# Patient Record
Sex: FEMALE | Race: BLACK OR AFRICAN AMERICAN | URBAN - METROPOLITAN AREA
[De-identification: names, ages, dates, MRNs, and addresses within clinical notes are randomized per-mention and may not be internally consistent; named-entity substitution may affect disease eponyms.]

---

## 2024-08-13 ENCOUNTER — OFFICE VISIT (OUTPATIENT)
Dept: ORTHOPEDIC SURGERY | Age: 89
End: 2024-08-13

## 2024-08-13 VITALS — HEIGHT: 60 IN

## 2024-08-13 DIAGNOSIS — M54.50 LUMBAR PAIN: Primary | ICD-10-CM

## 2024-08-13 RX ORDER — CARVEDILOL 25 MG/1
25 TABLET ORAL 2 TIMES DAILY WITH MEALS
COMMUNITY

## 2024-08-13 RX ORDER — ASCORBIC ACID, THIAMINE MONONITRATE,RIBOFLAVIN, NIACINAMIDE, PYRIDOXINE HYDROCHLORIDE, FOLIC ACID, CYANOCOBALAMIN, BIOTIN, CALCIUM PANTOTHENATE, 100; 1.5; 1.7; 20; 10; 1; 6000; 150000; 5 MG/1; MG/1; MG/1; MG/1; MG/1; MG/1; UG/1; UG/1; MG/1
1 CAPSULE, LIQUID FILLED ORAL DAILY
COMMUNITY

## 2024-08-13 RX ORDER — ATORVASTATIN CALCIUM 40 MG/1
40 TABLET, FILM COATED ORAL DAILY
COMMUNITY
Start: 2023-03-13

## 2024-08-13 RX ORDER — DICYCLOMINE HYDROCHLORIDE 10 MG/1
20 CAPSULE ORAL 4 TIMES DAILY PRN
COMMUNITY
Start: 2010-02-08

## 2024-08-13 RX ORDER — TRAMADOL HYDROCHLORIDE 50 MG/1
TABLET ORAL
COMMUNITY
Start: 2009-02-20 | End: 2025-04-30

## 2024-08-13 RX ORDER — ASPIRIN 81 MG/1
81 TABLET ORAL DAILY
COMMUNITY

## 2024-08-13 RX ORDER — AZITHROMYCIN 250 MG/1
TABLET, FILM COATED ORAL
COMMUNITY
Start: 2024-07-25

## 2024-08-13 RX ORDER — CIPROFLOXACIN 500 MG/1
TABLET, FILM COATED ORAL
COMMUNITY
Start: 2024-07-19

## 2024-08-13 RX ORDER — CLONIDINE 0.2 MG/24H
PATCH, EXTENDED RELEASE TRANSDERMAL
COMMUNITY
Start: 2021-06-24

## 2024-08-13 NOTE — PROGRESS NOTES
New Patient: LUMBAR SPINE    Referring Provider:  No ref. provider found    CHIEF COMPLAINT:    Chief Complaint   Patient presents with    Back Pain     lumbar       HISTORY OF PRESENT ILLNESS:       Ms. Aimee Taylor  is a pleasant 88 y.o. female, here with her son, who is extremely hard of hearing, here for evaluation regarding her LBP pain. She states her pain began possibly after a few falls over the past 9 months.  Her son states that her last fall was 3 to 4 months ago but no treatment was performed at the time.  She has a history of chronic back pain and has been seeing her primary care physician in [facility] who has had her on chronic pain medication.  Patient is on dialysis for end-stage renal disease and did have a change in mental status due to the Percocet and has recently been changed onto tramadol.  Her son states that tramadol is not helping her pain.  Patient states that her current low back pain is 10/10 with no radiation of pain into her buttocks or legs.  She states that the pain is constant last throughout the day and does interfere with her sleep at night.  She presents in a wheelchair and is a very limited ambulator.  She denies any numbness, tingling, or progressive weakness into either lower extremity.  She denies any new bowel or bladder dysfunction.  She finds that all positions are worse for her back even lying down.   Pain Assessment  Location of Pain: Back  Location Modifiers: Other (Comment)  Severity of Pain: 10  Quality of Pain: Other (Comment)  Duration of Pain: Other (Comment)]  Current/Past Treatment:   Physical Therapy: None  Chiropractic: None  Injection: None  Medications: Tramadol    Past Medical History:   History reviewed. No pertinent past medical history.     Past Surgical History:     History reviewed. No pertinent surgical history.    Current Medications:     Current Outpatient Medications:     atorvastatin (LIPITOR) 40 MG tablet, Take 1 tablet by mouth daily, Disp: ,

## 2025-04-29 ENCOUNTER — APPOINTMENT (OUTPATIENT)
Dept: CT IMAGING | Age: 89
End: 2025-04-29
Payer: COMMERCIAL

## 2025-04-29 ENCOUNTER — HOSPITAL ENCOUNTER (INPATIENT)
Age: 89
LOS: 11 days | Discharge: HOME | End: 2025-05-10
Attending: EMERGENCY MEDICINE | Admitting: STUDENT IN AN ORGANIZED HEALTH CARE EDUCATION/TRAINING PROGRAM
Payer: COMMERCIAL

## 2025-04-29 ENCOUNTER — APPOINTMENT (OUTPATIENT)
Dept: GENERAL RADIOLOGY | Age: 89
End: 2025-04-29
Payer: COMMERCIAL

## 2025-04-29 DIAGNOSIS — Z99.2 ESRD NEEDING DIALYSIS (HCC): Primary | ICD-10-CM

## 2025-04-29 DIAGNOSIS — E87.70 HYPERVOLEMIA, UNSPECIFIED HYPERVOLEMIA TYPE: ICD-10-CM

## 2025-04-29 DIAGNOSIS — R93.89 ABNORMAL CHEST CT: ICD-10-CM

## 2025-04-29 DIAGNOSIS — N18.6 ESRD NEEDING DIALYSIS (HCC): Primary | ICD-10-CM

## 2025-04-29 DIAGNOSIS — S34.109D CLOSED FRACTURE OF LUMBAR VERTEBRA WITH ROUTINE HEALING AND SPINAL CORD INJURY, SUBSEQUENT ENCOUNTER (HCC): ICD-10-CM

## 2025-04-29 DIAGNOSIS — S32.009D CLOSED FRACTURE OF LUMBAR VERTEBRA WITH ROUTINE HEALING AND SPINAL CORD INJURY, SUBSEQUENT ENCOUNTER (HCC): ICD-10-CM

## 2025-04-29 PROBLEM — E87.5 HYPERKALEMIA: Status: ACTIVE | Noted: 2025-04-29

## 2025-04-29 LAB
ALBUMIN SERPL-MCNC: 3.8 G/DL (ref 3.4–5)
ALP SERPL-CCNC: 112 U/L (ref 40–129)
ALT SERPL-CCNC: 24 U/L (ref 10–40)
ANION GAP SERPL CALCULATED.3IONS-SCNC: 13 MMOL/L (ref 3–16)
ANION GAP SERPL CALCULATED.3IONS-SCNC: 14 MMOL/L (ref 3–16)
AST SERPL-CCNC: 37 U/L (ref 15–37)
BACTERIA URNS QL MICRO: ABNORMAL /HPF
BASOPHILS # BLD: 0 K/UL (ref 0–0.2)
BASOPHILS NFR BLD: 0.2 %
BILIRUB DIRECT SERPL-MCNC: <0.1 MG/DL (ref 0–0.3)
BILIRUB INDIRECT SERPL-MCNC: 0.2 MG/DL (ref 0–1)
BILIRUB SERPL-MCNC: 0.3 MG/DL (ref 0–1)
BILIRUB UR QL STRIP.AUTO: NEGATIVE
BUN SERPL-MCNC: 45 MG/DL (ref 7–20)
BUN SERPL-MCNC: 46 MG/DL (ref 7–20)
CALCIUM SERPL-MCNC: 8.9 MG/DL (ref 8.3–10.6)
CALCIUM SERPL-MCNC: 9.1 MG/DL (ref 8.3–10.6)
CHLORIDE SERPL-SCNC: 87 MMOL/L (ref 99–110)
CHLORIDE SERPL-SCNC: 88 MMOL/L (ref 99–110)
CLARITY UR: CLEAR
CO2 SERPL-SCNC: 22 MMOL/L (ref 21–32)
CO2 SERPL-SCNC: 23 MMOL/L (ref 21–32)
COLOR UR: YELLOW
CREAT SERPL-MCNC: 6.6 MG/DL (ref 0.6–1.2)
CREAT SERPL-MCNC: 6.7 MG/DL (ref 0.6–1.2)
DEPRECATED RDW RBC AUTO: 15.1 % (ref 12.4–15.4)
EKG ATRIAL RATE: 63 BPM
EKG DIAGNOSIS: NORMAL
EKG P AXIS: 68 DEGREES
EKG P-R INTERVAL: 146 MS
EKG Q-T INTERVAL: 510 MS
EKG QRS DURATION: 136 MS
EKG QTC CALCULATION (BAZETT): 521 MS
EKG R AXIS: -69 DEGREES
EKG T AXIS: 114 DEGREES
EKG VENTRICULAR RATE: 63 BPM
EOSINOPHIL # BLD: 0.2 K/UL (ref 0–0.6)
EOSINOPHIL NFR BLD: 4.3 %
EPI CELLS #/AREA URNS HPF: ABNORMAL /HPF (ref 0–5)
FLUAV RNA RESP QL NAA+PROBE: NOT DETECTED
FLUBV RNA RESP QL NAA+PROBE: NOT DETECTED
GFR SERPLBLD CREATININE-BSD FMLA CKD-EPI: 6 ML/MIN/{1.73_M2}
GFR SERPLBLD CREATININE-BSD FMLA CKD-EPI: 6 ML/MIN/{1.73_M2}
GLUCOSE SERPL-MCNC: 63 MG/DL (ref 70–99)
GLUCOSE SERPL-MCNC: 72 MG/DL (ref 70–99)
GLUCOSE UR STRIP.AUTO-MCNC: NEGATIVE MG/DL
HCT VFR BLD AUTO: 35.8 % (ref 36–48)
HGB BLD-MCNC: 12.3 G/DL (ref 12–16)
HGB UR QL STRIP.AUTO: ABNORMAL
KETONES UR STRIP.AUTO-MCNC: NEGATIVE MG/DL
LEUKOCYTE ESTERASE UR QL STRIP.AUTO: ABNORMAL
LIPASE SERPL-CCNC: 21 U/L (ref 13–60)
LYMPHOCYTES # BLD: 1 K/UL (ref 1–5.1)
LYMPHOCYTES NFR BLD: 19.9 %
MAGNESIUM SERPL-MCNC: 2.26 MG/DL (ref 1.8–2.4)
MCH RBC QN AUTO: 31.3 PG (ref 26–34)
MCHC RBC AUTO-ENTMCNC: 34.3 G/DL (ref 31–36)
MCV RBC AUTO: 91.1 FL (ref 80–100)
MONOCYTES # BLD: 0.4 K/UL (ref 0–1.3)
MONOCYTES NFR BLD: 7.6 %
NEUTROPHILS # BLD: 3.5 K/UL (ref 1.7–7.7)
NEUTROPHILS NFR BLD: 68 %
NITRITE UR QL STRIP.AUTO: NEGATIVE
NT-PROBNP SERPL-MCNC: ABNORMAL PG/ML (ref 0–449)
PH UR STRIP.AUTO: 7.5 [PH] (ref 5–8)
PLATELET # BLD AUTO: 147 K/UL (ref 135–450)
PMV BLD AUTO: 9.4 FL (ref 5–10.5)
POTASSIUM SERPL-SCNC: 5.4 MMOL/L (ref 3.5–5.1)
POTASSIUM SERPL-SCNC: 5.7 MMOL/L (ref 3.5–5.1)
PROT SERPL-MCNC: 6.7 G/DL (ref 6.4–8.2)
PROT UR STRIP.AUTO-MCNC: 30 MG/DL
RBC # BLD AUTO: 3.93 M/UL (ref 4–5.2)
RBC #/AREA URNS HPF: ABNORMAL /HPF (ref 0–4)
SARS-COV-2 RNA RESP QL NAA+PROBE: NOT DETECTED
SODIUM SERPL-SCNC: 123 MMOL/L (ref 136–145)
SODIUM SERPL-SCNC: 124 MMOL/L (ref 136–145)
SP GR UR STRIP.AUTO: 1.01 (ref 1–1.03)
TROPONIN, HIGH SENSITIVITY: 105 NG/L (ref 0–14)
TROPONIN, HIGH SENSITIVITY: 99 NG/L (ref 0–14)
UA COMPLETE W REFLEX CULTURE PNL UR: ABNORMAL
UA DIPSTICK W REFLEX MICRO PNL UR: YES
URN SPEC COLLECT METH UR: ABNORMAL
UROBILINOGEN UR STRIP-ACNC: 0.2 E.U./DL
WBC # BLD AUTO: 5.1 K/UL (ref 4–11)
WBC #/AREA URNS HPF: ABNORMAL /HPF (ref 0–5)

## 2025-04-29 PROCEDURE — 6360000002 HC RX W HCPCS: Performed by: INTERNAL MEDICINE

## 2025-04-29 PROCEDURE — 81001 URINALYSIS AUTO W/SCOPE: CPT

## 2025-04-29 PROCEDURE — 74174 CTA ABD&PLVS W/CONTRAST: CPT

## 2025-04-29 PROCEDURE — 83690 ASSAY OF LIPASE: CPT

## 2025-04-29 PROCEDURE — 90935 HEMODIALYSIS ONE EVALUATION: CPT

## 2025-04-29 PROCEDURE — 6360000004 HC RX CONTRAST MEDICATION: Performed by: INTERNAL MEDICINE

## 2025-04-29 PROCEDURE — 84484 ASSAY OF TROPONIN QUANT: CPT

## 2025-04-29 PROCEDURE — 80048 BASIC METABOLIC PNL TOTAL CA: CPT

## 2025-04-29 PROCEDURE — 6370000000 HC RX 637 (ALT 250 FOR IP): Performed by: STUDENT IN AN ORGANIZED HEALTH CARE EDUCATION/TRAINING PROGRAM

## 2025-04-29 PROCEDURE — 99285 EMERGENCY DEPT VISIT HI MDM: CPT

## 2025-04-29 PROCEDURE — 80076 HEPATIC FUNCTION PANEL: CPT

## 2025-04-29 PROCEDURE — 85025 COMPLETE CBC W/AUTO DIFF WBC: CPT

## 2025-04-29 PROCEDURE — 36415 COLL VENOUS BLD VENIPUNCTURE: CPT

## 2025-04-29 PROCEDURE — 71046 X-RAY EXAM CHEST 2 VIEWS: CPT

## 2025-04-29 PROCEDURE — 2060000000 HC ICU INTERMEDIATE R&B

## 2025-04-29 PROCEDURE — 6370000000 HC RX 637 (ALT 250 FOR IP): Performed by: INTERNAL MEDICINE

## 2025-04-29 PROCEDURE — 83880 ASSAY OF NATRIURETIC PEPTIDE: CPT

## 2025-04-29 PROCEDURE — 6360000002 HC RX W HCPCS: Performed by: STUDENT IN AN ORGANIZED HEALTH CARE EDUCATION/TRAINING PROGRAM

## 2025-04-29 PROCEDURE — 87636 SARSCOV2 & INF A&B AMP PRB: CPT

## 2025-04-29 PROCEDURE — 83735 ASSAY OF MAGNESIUM: CPT

## 2025-04-29 PROCEDURE — 5A1D70Z PERFORMANCE OF URINARY FILTRATION, INTERMITTENT, LESS THAN 6 HOURS PER DAY: ICD-10-PCS | Performed by: INTERNAL MEDICINE

## 2025-04-29 PROCEDURE — 93005 ELECTROCARDIOGRAM TRACING: CPT | Performed by: INTERNAL MEDICINE

## 2025-04-29 RX ORDER — ONDANSETRON 4 MG/1
4 TABLET, FILM COATED ORAL EVERY 6 HOURS PRN
COMMUNITY

## 2025-04-29 RX ORDER — NIFEDIPINE 60 MG/1
60 TABLET, EXTENDED RELEASE ORAL DAILY
Status: DISCONTINUED | OUTPATIENT
Start: 2025-04-30 | End: 2025-05-08

## 2025-04-29 RX ORDER — ONDANSETRON 2 MG/ML
4 INJECTION INTRAMUSCULAR; INTRAVENOUS EVERY 6 HOURS PRN
Status: DISCONTINUED | OUTPATIENT
Start: 2025-04-29 | End: 2025-05-10 | Stop reason: HOSPADM

## 2025-04-29 RX ORDER — IOPAMIDOL 755 MG/ML
75 INJECTION, SOLUTION INTRAVASCULAR
Status: COMPLETED | OUTPATIENT
Start: 2025-04-29 | End: 2025-04-29

## 2025-04-29 RX ORDER — SODIUM CHLORIDE 0.9 % (FLUSH) 0.9 %
5-40 SYRINGE (ML) INJECTION EVERY 12 HOURS SCHEDULED
Status: DISCONTINUED | OUTPATIENT
Start: 2025-04-29 | End: 2025-05-10 | Stop reason: HOSPADM

## 2025-04-29 RX ORDER — PRIMIDONE 50 MG/1
50 TABLET ORAL DAILY
COMMUNITY

## 2025-04-29 RX ORDER — HYDROCODONE BITARTRATE AND ACETAMINOPHEN 5; 325 MG/1; MG/1
1 TABLET ORAL EVERY 8 HOURS PRN
Status: ON HOLD | COMMUNITY
End: 2025-05-10 | Stop reason: HOSPADM

## 2025-04-29 RX ORDER — CARVEDILOL 25 MG/1
25 TABLET ORAL 2 TIMES DAILY WITH MEALS
Status: DISCONTINUED | OUTPATIENT
Start: 2025-04-29 | End: 2025-05-04

## 2025-04-29 RX ORDER — MORPHINE SULFATE 2 MG/ML
2 INJECTION, SOLUTION INTRAMUSCULAR; INTRAVENOUS
Status: COMPLETED | OUTPATIENT
Start: 2025-04-29 | End: 2025-04-29

## 2025-04-29 RX ORDER — POLYETHYLENE GLYCOL 3350 17 G/17G
17 POWDER, FOR SOLUTION ORAL DAILY PRN
Status: DISCONTINUED | OUTPATIENT
Start: 2025-04-29 | End: 2025-05-10 | Stop reason: HOSPADM

## 2025-04-29 RX ORDER — HEPARIN SODIUM 5000 [USP'U]/ML
5000 INJECTION, SOLUTION INTRAVENOUS; SUBCUTANEOUS EVERY 8 HOURS SCHEDULED
Status: DISCONTINUED | OUTPATIENT
Start: 2025-04-30 | End: 2025-05-10 | Stop reason: HOSPADM

## 2025-04-29 RX ORDER — ACETAMINOPHEN 325 MG/1
650 TABLET ORAL EVERY 6 HOURS PRN
Status: DISCONTINUED | OUTPATIENT
Start: 2025-04-29 | End: 2025-05-10 | Stop reason: HOSPADM

## 2025-04-29 RX ORDER — ONDANSETRON 4 MG/1
4 TABLET, ORALLY DISINTEGRATING ORAL EVERY 8 HOURS PRN
Status: DISCONTINUED | OUTPATIENT
Start: 2025-04-29 | End: 2025-05-10 | Stop reason: HOSPADM

## 2025-04-29 RX ORDER — FENTANYL CITRATE 50 UG/ML
25 INJECTION, SOLUTION INTRAMUSCULAR; INTRAVENOUS ONCE
Refills: 0 | Status: COMPLETED | OUTPATIENT
Start: 2025-04-29 | End: 2025-04-29

## 2025-04-29 RX ORDER — HYDROCODONE BITARTRATE AND ACETAMINOPHEN 5; 325 MG/1; MG/1
1 TABLET ORAL ONCE
Status: COMPLETED | OUTPATIENT
Start: 2025-04-29 | End: 2025-04-29

## 2025-04-29 RX ORDER — SODIUM CHLORIDE 9 MG/ML
INJECTION, SOLUTION INTRAVENOUS PRN
Status: DISCONTINUED | OUTPATIENT
Start: 2025-04-29 | End: 2025-05-10 | Stop reason: HOSPADM

## 2025-04-29 RX ORDER — LABETALOL HYDROCHLORIDE 5 MG/ML
10 INJECTION, SOLUTION INTRAVENOUS EVERY 6 HOURS PRN
Status: DISCONTINUED | OUTPATIENT
Start: 2025-04-29 | End: 2025-05-09

## 2025-04-29 RX ORDER — ATORVASTATIN CALCIUM 40 MG/1
40 TABLET, FILM COATED ORAL NIGHTLY
Status: DISCONTINUED | OUTPATIENT
Start: 2025-04-29 | End: 2025-05-10 | Stop reason: HOSPADM

## 2025-04-29 RX ORDER — PRIMIDONE 50 MG/1
50 TABLET ORAL DAILY
Status: DISCONTINUED | OUTPATIENT
Start: 2025-04-29 | End: 2025-05-10 | Stop reason: HOSPADM

## 2025-04-29 RX ORDER — HYDRALAZINE HYDROCHLORIDE 20 MG/ML
20 INJECTION INTRAMUSCULAR; INTRAVENOUS EVERY 6 HOURS PRN
Status: DISCONTINUED | OUTPATIENT
Start: 2025-04-29 | End: 2025-05-06

## 2025-04-29 RX ORDER — NIFEDIPINE 90 MG/1
90 TABLET, EXTENDED RELEASE ORAL DAILY
Status: ON HOLD | COMMUNITY
End: 2025-05-10 | Stop reason: HOSPADM

## 2025-04-29 RX ORDER — ACETAMINOPHEN 650 MG/1
650 SUPPOSITORY RECTAL EVERY 6 HOURS PRN
Status: DISCONTINUED | OUTPATIENT
Start: 2025-04-29 | End: 2025-05-10 | Stop reason: HOSPADM

## 2025-04-29 RX ORDER — ASPIRIN 81 MG/1
324 TABLET, CHEWABLE ORAL ONCE
Status: COMPLETED | OUTPATIENT
Start: 2025-04-29 | End: 2025-04-29

## 2025-04-29 RX ORDER — HEPARIN SODIUM 1000 [USP'U]/ML
3400 INJECTION, SOLUTION INTRAVENOUS; SUBCUTANEOUS
Status: COMPLETED | OUTPATIENT
Start: 2025-04-29 | End: 2025-05-02

## 2025-04-29 RX ORDER — SODIUM CHLORIDE 0.9 % (FLUSH) 0.9 %
5-40 SYRINGE (ML) INJECTION PRN
Status: DISCONTINUED | OUTPATIENT
Start: 2025-04-29 | End: 2025-05-10 | Stop reason: HOSPADM

## 2025-04-29 RX ADMIN — SODIUM ZIRCONIUM CYCLOSILICATE 10 G: 5 POWDER, FOR SUSPENSION ORAL at 14:26

## 2025-04-29 RX ADMIN — FENTANYL CITRATE 25 MCG: 50 INJECTION INTRAMUSCULAR; INTRAVENOUS at 13:53

## 2025-04-29 RX ADMIN — IOPAMIDOL 75 ML: 755 INJECTION, SOLUTION INTRAVENOUS at 10:51

## 2025-04-29 RX ADMIN — CARVEDILOL 25 MG: 25 TABLET, FILM COATED ORAL at 20:39

## 2025-04-29 RX ADMIN — MORPHINE SULFATE 2 MG: 2 INJECTION, SOLUTION INTRAMUSCULAR; INTRAVENOUS at 21:32

## 2025-04-29 RX ADMIN — ATORVASTATIN CALCIUM 40 MG: 40 TABLET, FILM COATED ORAL at 20:39

## 2025-04-29 RX ADMIN — PRIMIDONE 50 MG: 50 TABLET ORAL at 20:40

## 2025-04-29 RX ADMIN — ASPIRIN 324 MG: 81 TABLET, CHEWABLE ORAL at 12:46

## 2025-04-29 RX ADMIN — LABETALOL HYDROCHLORIDE 10 MG: 5 INJECTION, SOLUTION INTRAVENOUS at 14:25

## 2025-04-29 RX ADMIN — HYDROCODONE BITARTRATE AND ACETAMINOPHEN 1 TABLET: 5; 325 TABLET ORAL at 11:37

## 2025-04-29 ASSESSMENT — PAIN SCALES - GENERAL
PAINLEVEL_OUTOF10: 9
PAINLEVEL_OUTOF10: 0
PAINLEVEL_OUTOF10: 8
PAINLEVEL_OUTOF10: 5
PAINLEVEL_OUTOF10: 8

## 2025-04-29 NOTE — ED NOTES
Patient Name: Aimee Taylor  : 1935 89 y.o.  MRN: [id number]  ED Room #: C30/C30-30     Chief complaint:   Chief Complaint   Patient presents with    Dizziness     Pt arrived from [facility] w/ c/o dizziness, and had refused dialysis the other day. Pt also refused therapy today. [place].     Hospital Problem/Diagnosis:   Hospital Problems           Last Modified POA    * (Principal) Hyperkalemia 2025 Yes         O2 Flow Rate:O2 Device: Nasal cannula O2 Flow Rate (L/min): 3 L/min (if applicable)  Cardiac Rhythm:   (if applicable)  Active LDA's:   Peripheral IV 25 Left;Anterior Forearm (Active)   Site Assessment Clean, dry & intact 25   Line Status Brisk blood return;Flushed;Specimen collected;Normal saline locked 25   Line Care Connections checked and tightened 25   Dressing Status Clean, dry & intact 25            How does patient ambulate? Bed Bound    2. How does patient take pills? Whole with Water    3. Is patient alert? Alert    4. Is patient oriented? To Person, To Place, To Time, To Situation, and Follows Commands patient has moments of confusion.    5.   Patient arrived from:  nursing home  Facility Name: ________[facility]______________________________    6. If patient is disoriented or from a Skill Nursing Facility has family been notified of admission? Yes    7. Patient belongings? Belongings: Clothing    Disposition of belongings? Kept with Patient     8. Any specific patient or family belongings/needs/dynamics?   a.     9. Miscellaneous comments/pending orders?  a.      If there are any additional questions please reach out to the Emergency Department.      Jazz Espinosa RN  25       Jazz Espinosa RN  25

## 2025-04-29 NOTE — ED PROVIDER NOTES
ED Attending Attestation Note     Date of evaluation: 4/29/2025    This patient was seen by the advance practice provider.  I have seen and examined the patient, agree with the workup, evaluation, management and diagnosis. The care plan has been discussed.  I have reviewed the ECG and concur with the MILVIA's interpretation.      Briefly, Aimee Taylor is a 89 y.o. female with a PMH inclusive of ESRD on dialysis who presents for evaluation of upper abdomen and chest pain. Missed dialysis yesterday because she did not want to go. Also did not want to do PT today. Also reports dizziness.     Notable exam findings include left sided abd tenderness, hypertensive, no acute distress    Assessment/ Medical Decision Making:     Will obtain labs, imaging.  Anticipate need for admission given missed dialysis.          Torey Brewer MD  04/29/25 0965

## 2025-04-29 NOTE — ED NOTES
Discussed patients BP with MLP. No additional orders at this time.     Tony Ceja, RN  04/29/25 7811

## 2025-04-29 NOTE — CONSULTS
Nephrology Consult Note  [phone]   [email or web address]        Reason for Consult:  ESKD    HISTORY OF PRESENT ILLNESS:                This is a patient with significant past medical history of ESKD on HD, MWF at [facility], dementia, HTN, CAD/CHF, s/p CABG, chronic back pain, who was recently admitted at [facility]-presented to ED who presented to ED from NH after refusing to go to HD yesterday.  She reports back pain that is chronic.  She is a poor historian and unable to provide history.  She denies any chest pain or SOB.  She does not know her HD unit location.  Labs in ED revealed hyperkalemia with   -Dr. Quiñones from [facility] was consulted-he forwarded consulted to [facility] as patient is followed by us    Past Medical History:        Diagnosis Date    Acute kidney failure     Benign paroxysmal vertigo, bilateral     Chronic gout, unspecified, without tophus (tophi)     Cognitive communication deficit     End stage renal disease (HCC)     Essential (primary) hypertension     Myocardial infarction type 2 (HCC)     Postcholecystectomy syndrome     Sensorineural hearing loss (SNHL), bilateral     Thrombocytopenia        Past Surgical History:    History reviewed. No pertinent surgical history.    Current Medications:    No current facility-administered medications on file prior to encounter.     Current Outpatient Medications on File Prior to Encounter   Medication Sig Dispense Refill    HYDROcodone-acetaminophen (NORCO) 5-325 MG per tablet Take 1 tablet by mouth every 8 hours as needed for Pain. Max Daily Amount: 3 tablets      NIFEdipine (PROCARDIA XL) 60 MG extended release tablet Take 1 tablet by mouth daily      ondansetron (ZOFRAN) 4 MG tablet Take 1 tablet by mouth every 8 hours as needed for Nausea or Vomiting      primidone (MYSOLINE) 50 MG tablet Take 1 tablet by mouth daily      aspirin 81 MG EC tablet Take 1 tablet by mouth daily      atorvastatin (LIPITOR) 40 MG tablet Take 1  a lack of transportation kept you from medical appointments or from doing things needed for daily living?: No   Physical Activity: Unknown (12/13/2019)    Received from [facility]    Exercise Vital Sign     Days of Exercise per Week: Patient declined     Minutes of Exercise per Session: Patient declined   Stress: Unknown (12/13/2019)    Received from [facility]    [place] [employer] - Occupational Stress Questionnaire     Feeling of Stress : Patient declined   Social Connections: Unknown (12/13/2019)    Received from [facility]    Social Connection and Isolation Panel [NHANES]     Frequency of Communication with Friends and Family: Patient declined     Frequency of Social Gatherings with Friends and Family: Patient declined     Attends Nondenominational Services: Patient declined     Active Member of Clubs or Organizations: Patient declined     Attends Club or Organization Meetings: Patient declined     Marital Status: Patient declined   Intimate Partner Violence: Not At Risk (2/6/2024)    Received from [facility]    Interpersonal Safety     Do you feel physically or emotionally unsafe where you currently live?: No     Within the past 12 months, have you been hit, slapped, kicked or otherwise physically hurt by anyone? : No     Within the past 12 months, have you been hit, slapped, kicked or otherwise physically hurt by anyone? : No   Housing Stability: Not At Risk (2/6/2024)    Received from [facility]    Housing/Utilities     Are you worried about losing your housing? : No     Within the past 12 months, have you ever stayed: outside, in a car, in a tent, in an overnight shelter, or temporarily in someone else's home (i.e. couch-surfing)?: No

## 2025-04-29 NOTE — ED NOTES
Dialysis called ready for patient. Unable to continue Tele monitoring while patient is in dialysis, discussed with charge RN and Clinical at this time. Perfectserve sent to Dr Mendez. Per MD swenson to hold tele while in diaylsis. Dialysis RN Christina informed. Requesting transport for patient at this time.      Tony Ceja, RN  04/29/25 4602

## 2025-04-29 NOTE — ED NOTES
Pts son expressed concern for patient to receive MRI for her back Fxs while admitted to hospital today. Discussed this concern with MLP. MLP to bedside to update patients son at this time.      Tony Ceja RN  04/29/25 6011

## 2025-04-29 NOTE — H&P
V2.0  History and Physical      Name:  Aimee Taylor /Age/Sex: 1935  (89 y.o. female)   MRN & CSN:  [id number] & [id number] Encounter Date/Time: 2025 7:49 PM EDT   Location:  [place] [address] PCP: No primary care provider on file.       Hospital Day: 1    Assessment and Plan:     88yo female with history of ESRD on HD, HTN, cad, previous MI, hearing loss, gout, presenting from [facility] facility with chills, back pain, and currently being admditted with electrolyte disturbances and missing HD.      ESRD on HD  -HD as per Nephrology. Anticipate discharge to facility with HD services. Will reach out to case management.     Moderate Hyponatremia - improving  -Continue to monitor. BMP in the am.     Hyperkalemia - resolved  -2/2 esrd    Back pain    Elevated troponin - stable    Normocytic anemia - stable    Disposition:   Current Living situation: AL  Expected Disposition: SNF  Estimated D/C: 1-2 days    Diet Diet NPO   DVT Prophylaxis [] Lovenox, [x]  Heparin, [] SCDs, [] Ambulation,  [] Eliquis, [] Xarelto, [] Coumadin   Code Status Full Code   Surrogate Decision Maker/ POA On file, son.      Personally reviewed Lab Studies and Imaging     Discussed management of the case with case management. I reviewed Nephrology's note.       History from:     patient    History of Present Illness:     88yo female with history of ESRD on HD, HTN, cad, previous MI, hearing loss, gout, presenting from AS facility with chills, back pain, and currently being admditted with electrolyte disturbances and missing HD.       Review of Systems:        Pertinent positives and negatives discussed in HPI     Objective:   No intake or output data in the 24 hours ending 25   Vitals:   Vitals:    25 1400 25 1502 25 1536 25   BP: (!) 180/87 (!) 179/75 (!) 169/82 (!) 164/87   Pulse: 76 61 60 66   Resp: 18 14 16 18   Temp:   97.6 °F (36.4 °C) 97.9 °F (36.6 °C)   TempSrc:    Oral   SpO2: 98% 98%  100%

## 2025-04-29 NOTE — ED NOTES
Pts Oxygen turned off without RN being informed. Pt SPO2 dropped to 84%. O2 resumed @ this time.      Tony Ceja, RN  04/29/25 4578

## 2025-04-29 NOTE — ED NOTES
Per MD ok for patient to eat. Pt provided with lunchbox at this time.      Tony Ceja, RN  04/29/25 7874

## 2025-04-29 NOTE — ED PROVIDER NOTES
THE [facility]  EMERGENCY DEPARTMENT ENCOUNTER          PHYSICIAN ASSISTANT NOTE       Date of evaluation: 4/29/2025    Chief Complaint     Dizziness (Pt arrived from [facility] w/ c/o dizziness, and had refused dialysis the other day. Pt also refused therapy today. [place].)      History of Present Illness     Aimee Taylor is a 89 y.o. female who presents with a PMH of ESRD on HD, heart failure with a last EF of 60 to 65% with severely dilated left atrium., HTN, HLD, DM type II, CAD s/p CABG, lumbar spinal stenosis with radiculopathy s/p L2/L3 lateral interbody fusion, L2 vertebral fracture with 30% height loss who presents to the emergency department with a variety of symptoms.  Per EMS/nursing home, patient refused dialysis yesterday and refused physical therapy today.  Per nursing home, patient was complaining of chest pain however per EMS patient was complaining of upper abdominal pain.  Reported to have some dizziness and high blood pressure.    On history, patient is very hard of hearing and is unable to read lips accurately.  Limiting history.      ASSESSMENT / PLAN  (MEDICAL DECISION MAKING)     INITIAL VITALS: BP: (!) 200/90, Temp: 97.9 °F (36.6 °C), Pulse: 61, Respirations: 16, SpO2: 94 %    Aimee Taylor is a 89 y.o. female who presents with a PMH of ESRD on HD, heart failure with a last EF of 60 to 65% with severely dilated left atrium., HTN, HLD, DM type II, CAD s/p CABG, lumbar spinal stenosis with radiculopathy s/p L2/L3 lateral interbody fusion, L2 vertebral fracture with 30% height loss who presents to the emergency department with a variety of symptoms.  Per EMS/nursing home, patient refused dialysis yesterday and refused physical therapy today.  Per nursing home, patient was complaining of chest pain however per EMS patient was complaining of upper abdominal pain.  Reported to have some dizziness and high blood pressure.    On history, patient is very hard of hearing and is

## 2025-04-30 LAB
ANION GAP SERPL CALCULATED.3IONS-SCNC: 13 MMOL/L (ref 3–16)
BASOPHILS # BLD: 0 K/UL (ref 0–0.2)
BASOPHILS NFR BLD: 0.2 %
BUN SERPL-MCNC: 21 MG/DL (ref 7–20)
CALCIUM SERPL-MCNC: 8.6 MG/DL (ref 8.3–10.6)
CHLORIDE SERPL-SCNC: 91 MMOL/L (ref 99–110)
CO2 SERPL-SCNC: 25 MMOL/L (ref 21–32)
CREAT SERPL-MCNC: 4.1 MG/DL (ref 0.6–1.2)
DEPRECATED RDW RBC AUTO: 14.8 % (ref 12.4–15.4)
EOSINOPHIL # BLD: 0.2 K/UL (ref 0–0.6)
EOSINOPHIL NFR BLD: 3.7 %
GFR SERPLBLD CREATININE-BSD FMLA CKD-EPI: 10 ML/MIN/{1.73_M2}
GLUCOSE BLD-MCNC: 110 MG/DL (ref 70–99)
GLUCOSE SERPL-MCNC: 64 MG/DL (ref 70–99)
HCT VFR BLD AUTO: 34.6 % (ref 36–48)
HGB BLD-MCNC: 11.7 G/DL (ref 12–16)
LYMPHOCYTES # BLD: 1.1 K/UL (ref 1–5.1)
LYMPHOCYTES NFR BLD: 23.1 %
MCH RBC QN AUTO: 30.8 PG (ref 26–34)
MCHC RBC AUTO-ENTMCNC: 33.8 G/DL (ref 31–36)
MCV RBC AUTO: 91.1 FL (ref 80–100)
MONOCYTES # BLD: 0.5 K/UL (ref 0–1.3)
MONOCYTES NFR BLD: 10.6 %
NEUTROPHILS # BLD: 2.9 K/UL (ref 1.7–7.7)
NEUTROPHILS NFR BLD: 62.4 %
PERFORMED ON: ABNORMAL
PHOSPHATE SERPL-MCNC: 3.7 MG/DL (ref 2.5–4.9)
PLATELET # BLD AUTO: 119 K/UL (ref 135–450)
PMV BLD AUTO: 9.8 FL (ref 5–10.5)
POTASSIUM SERPL-SCNC: 4 MMOL/L (ref 3.5–5.1)
RBC # BLD AUTO: 3.79 M/UL (ref 4–5.2)
SODIUM SERPL-SCNC: 129 MMOL/L (ref 136–145)
WBC # BLD AUTO: 4.6 K/UL (ref 4–11)

## 2025-04-30 PROCEDURE — 85025 COMPLETE CBC W/AUTO DIFF WBC: CPT

## 2025-04-30 PROCEDURE — 6360000002 HC RX W HCPCS: Performed by: NURSE PRACTITIONER

## 2025-04-30 PROCEDURE — 36415 COLL VENOUS BLD VENIPUNCTURE: CPT

## 2025-04-30 PROCEDURE — 6360000002 HC RX W HCPCS: Performed by: STUDENT IN AN ORGANIZED HEALTH CARE EDUCATION/TRAINING PROGRAM

## 2025-04-30 PROCEDURE — 2060000000 HC ICU INTERMEDIATE R&B

## 2025-04-30 PROCEDURE — 51798 US URINE CAPACITY MEASURE: CPT

## 2025-04-30 PROCEDURE — 6370000000 HC RX 637 (ALT 250 FOR IP): Performed by: STUDENT IN AN ORGANIZED HEALTH CARE EDUCATION/TRAINING PROGRAM

## 2025-04-30 PROCEDURE — 80048 BASIC METABOLIC PNL TOTAL CA: CPT

## 2025-04-30 PROCEDURE — 2580000003 HC RX 258: Performed by: INTERNAL MEDICINE

## 2025-04-30 PROCEDURE — 84100 ASSAY OF PHOSPHORUS: CPT

## 2025-04-30 PROCEDURE — 2500000003 HC RX 250 WO HCPCS: Performed by: STUDENT IN AN ORGANIZED HEALTH CARE EDUCATION/TRAINING PROGRAM

## 2025-04-30 RX ORDER — HYDROMORPHONE HYDROCHLORIDE 1 MG/ML
0.25 INJECTION, SOLUTION INTRAMUSCULAR; INTRAVENOUS; SUBCUTANEOUS
Status: COMPLETED | OUTPATIENT
Start: 2025-04-30 | End: 2025-04-30

## 2025-04-30 RX ORDER — DEXTROSE MONOHYDRATE 100 MG/ML
INJECTION, SOLUTION INTRAVENOUS CONTINUOUS PRN
Status: DISCONTINUED | OUTPATIENT
Start: 2025-04-30 | End: 2025-05-10 | Stop reason: HOSPADM

## 2025-04-30 RX ORDER — GLUCAGON 1 MG/ML
1 KIT INJECTION PRN
Status: DISCONTINUED | OUTPATIENT
Start: 2025-04-30 | End: 2025-05-10 | Stop reason: HOSPADM

## 2025-04-30 RX ORDER — OXYCODONE HYDROCHLORIDE 5 MG/1
5 TABLET ORAL EVERY 6 HOURS PRN
Refills: 0 | Status: DISCONTINUED | OUTPATIENT
Start: 2025-04-30 | End: 2025-05-06

## 2025-04-30 RX ADMIN — CARVEDILOL 25 MG: 25 TABLET, FILM COATED ORAL at 08:38

## 2025-04-30 RX ADMIN — ATORVASTATIN CALCIUM 40 MG: 40 TABLET, FILM COATED ORAL at 20:33

## 2025-04-30 RX ADMIN — HEPARIN SODIUM 5000 UNITS: 5000 INJECTION INTRAVENOUS; SUBCUTANEOUS at 22:00

## 2025-04-30 RX ADMIN — OXYCODONE 5 MG: 5 TABLET ORAL at 12:42

## 2025-04-30 RX ADMIN — CARVEDILOL 25 MG: 25 TABLET, FILM COATED ORAL at 15:32

## 2025-04-30 RX ADMIN — SODIUM CHLORIDE, PRESERVATIVE FREE 10 ML: 5 INJECTION INTRAVENOUS at 09:06

## 2025-04-30 RX ADMIN — HEPARIN SODIUM 5000 UNITS: 5000 INJECTION INTRAVENOUS; SUBCUTANEOUS at 05:34

## 2025-04-30 RX ADMIN — SODIUM CHLORIDE, PRESERVATIVE FREE 10 ML: 5 INJECTION INTRAVENOUS at 20:33

## 2025-04-30 RX ADMIN — NIFEDIPINE 60 MG: 60 TABLET, EXTENDED RELEASE ORAL at 08:38

## 2025-04-30 RX ADMIN — PRIMIDONE 50 MG: 50 TABLET ORAL at 17:15

## 2025-04-30 RX ADMIN — DEXTROSE 125 ML: 10 SOLUTION INTRAVENOUS at 07:27

## 2025-04-30 RX ADMIN — HEPARIN SODIUM 5000 UNITS: 5000 INJECTION INTRAVENOUS; SUBCUTANEOUS at 15:27

## 2025-04-30 RX ADMIN — HYDROMORPHONE HYDROCHLORIDE 0.25 MG: 1 INJECTION, SOLUTION INTRAMUSCULAR; INTRAVENOUS; SUBCUTANEOUS at 04:21

## 2025-04-30 ASSESSMENT — PAIN DESCRIPTION - ORIENTATION
ORIENTATION: MID
ORIENTATION: MID

## 2025-04-30 ASSESSMENT — PAIN DESCRIPTION - FREQUENCY
FREQUENCY: CONTINUOUS
FREQUENCY: CONTINUOUS

## 2025-04-30 ASSESSMENT — PAIN DESCRIPTION - PAIN TYPE
TYPE: CHRONIC PAIN
TYPE: CHRONIC PAIN

## 2025-04-30 ASSESSMENT — PAIN DESCRIPTION - DESCRIPTORS
DESCRIPTORS: DISCOMFORT;ACHING
DESCRIPTORS: ACHING

## 2025-04-30 ASSESSMENT — PAIN SCALES - GENERAL
PAINLEVEL_OUTOF10: 0
PAINLEVEL_OUTOF10: 10
PAINLEVEL_OUTOF10: 6
PAINLEVEL_OUTOF10: 0
PAINLEVEL_OUTOF10: 5

## 2025-04-30 ASSESSMENT — PAIN DESCRIPTION - ONSET
ONSET: ON-GOING
ONSET: ON-GOING

## 2025-04-30 ASSESSMENT — PAIN - FUNCTIONAL ASSESSMENT
PAIN_FUNCTIONAL_ASSESSMENT: ACTIVITIES ARE NOT PREVENTED
PAIN_FUNCTIONAL_ASSESSMENT: ACTIVITIES ARE NOT PREVENTED

## 2025-04-30 ASSESSMENT — PAIN DESCRIPTION - LOCATION
LOCATION: BACK
LOCATION: BACK

## 2025-04-30 NOTE — CARE COORDINATION
Case Management Assessment  Initial Evaluation    Date/Time of Evaluation: 4/30/2025 9:54 AM  Assessment Completed by: Juliana Pearson    If patient is discharged prior to next notation, then this note serves as note for discharge by case management.    Patient Name: Aimee Taylor                   YOB: 1935  Diagnosis: Hyperkalemia [E87.5]  ESRD needing dialysis (HCC) [N18.6, Z99.2]  Hypervolemia, unspecified hypervolemia type [E87.70]                   Date / Time: 4/29/2025  9:06 AM    Patient Admission Status: Inpatient   Readmission Risk (Low < 19, Mod (19-27), High > 27): Readmission Risk Score: 15.4    Current PCP: No primary care provider on file.  PCP verified by CM?      Chart Reviewed: Yes      History Provided by:    Patient Orientation:      Patient Cognition:      Hospitalization in the last 30 days (Readmission):  No    If yes, Readmission Assessment in CM Navigator will be completed.    Advance Directives:      Code Status: Full Code   Patient's Primary Decision Maker is:        Discharge Planning:    Patient lives with:   Type of Home:    Primary Care Giver:    Patient Support Systems include:     Current Financial resources:    Current community resources:    Current services prior to admission:              Current DME:              Type of Home Care services:       ADLS  Prior functional level:    Current functional level:      PT AM-PAC:   /24  OT AM-PAC:   /24    Family can provide assistance at DC:    Would you like Case Management to discuss the discharge plan with any other family members/significant others, and if so, who?    Plans to Return to Present Housing:    Other Identified Issues/Barriers to RETURNING to current housing: none  Potential Assistance needed at discharge:              Potential DME:    Patient expects to discharge to:    Plan for transportation at discharge:      Financial    Payor: [facility] / Plan: [facility] DUAL / Product Type: *No

## 2025-04-30 NOTE — CONSULTS
[facility]  Palliative Medicine Consultation Note      Date Of Admission:4/29/2025  Date of consult: 04/30/25  Seen by PC in the past:  No    Recommendations:        Went to see pt at the bedside, she was sleeping upon arrival and extremely [place]. Unable to participate in a goals of care discussion.    Called pt's daughter Cici who is listed as pt's primary contact at her facility. No answer, left VM with callback number.     Called Cici again, no answer, left VM with callback number.     Called pt's son Ronny, no answer, left VM with callback number.     Called pt's son Barrett, no answer, left VM with callback number.     1. Goals of Care/Advanced Care planning/Code status: Full code per documentation from pt's facility. Pt's children appear to be her NOK. No advance directives on file. I am not able to reach them at this time.   2. Pain: pt did not state, does not appear uncomfortable.  3. SOB: pt resting comfortably on 2LNC  4. Refusing dialysis: pt reportedly refused dialysis at her facility yesterday for unknown reasons. She got dialysis last night. Unable to discuss with her today. I cannot see that pt has missed dialysis previously. She had several recent visits to [facility] but I am unable to access these records.   5. Disposition: Pt from [facility], likely to return when medically ready for discharge    Reason for Consult:         [x]  Goals of Care  []  Code Status Discussion/Advanced Care Planning   []  Psychosocial/Family Support  []  Symptom Management  []  Other (Specify)    Requesting Physician: Dr. Christopher Mendez    CHIEF COMPLAINT:  refusal of dialysis    History Obtained From:  patient, electronic medical record    History of Present Illness:         Aimee Taylor is a 89 y.o. female with PMH of  ESRD on HD, heart failure with a last EF of 60 to 65% with severely dilated left atrium., dementia, HTN, HLD, DM type II, CAD s/p CABG, lumbar spinal stenosis with  4.6   HGB 12.3 11.7*   HCT 35.8* 34.6*    119*       LFT's:   Recent Labs     04/29/25  0941   AST 37   ALT 24   BILITOT 0.3   ALKPHOS 112     Troponin: No results for input(s): \"TROPONINI\" in the last 72 hours.  BNP: No results for input(s): \"BNP\" in the last 72 hours.  ABGs: No results for input(s): \"PHART\", \"[id number]\", \"PO2ART\" in the last 72 hours.  INR: No results for input(s): \"INR\" in the last 72 hours.    U/A:  Recent Labs     04/29/25  1123   COLORU Yellow   PHUR 7.5   WBCUA 3-5   RBCUA 0-2   BACTERIA 1+*   CLARITYU Clear   LEUKOCYTESUR SMALL*   UROBILINOGEN 0.2   BILIRUBINUR Negative   BLOODU TRACE-INTACT*   GLUCOSEU Negative       CTA CHEST ABDOMEN PELVIS W CONTRAST   Final Result   1.  No evidence of acute aortic injury. No dissection, penetrating ulcer or   aneurysm.   2.  Relative hypoenhancement of a 2.5 cm subendocardial region in the left   lateral ventricular wall which could be acute or chronic infarct or   hypoperfusion.   3.  Small bilateral pleural effusions with adjacent subsegmental atelectasis.   4.  No definite acute findings in the chest abdomen or pelvis.      Electronically signed by Mic Rico      XR CHEST (2 VW)   Final Result   1.  Bilateral patchy groundglass and consolidative opacities may be focal   pneumonia versus pulmonary edema. Small right pleural effusion.      Electronically signed by Mic Rico            Conclusion/Time spent:         Recommendations see above    Time spent with patient and/or family: 15  Time reviewing records: 10 min   Time communicating with staff: 5 min     A total of 30 minutes spent with the patient and family on unit greater than 50% in counseling regarding palliative care and in goals of care for the patient.    Thank you to Dr. Christopher Mendez for this consultation. We will continue to follow Ms. Taylor's care as needed.      Margaret Collado [facility]  Inpatient Palliative Care  [phone]

## 2025-04-30 NOTE — PROGRESS NOTES
Pt admitted to 4451 from ED and placed on telemetry.     4 Eyes Skin Assessment     NAME:  Aimee Taylor  YOB: 1935  MEDICAL RECORD NUMBER:  [id number]    The patient is being assessed for  Admission    I agree that at least one RN has performed a thorough Head to Toe Skin Assessment on the patient. ALL assessment sites listed below have been assessed.      Areas assessed by both nurses:    Head, Face, Ears, Shoulders, Back, Chest, Arms, Elbows, Hands, Sacrum. Buttock, Coccyx, Ischium, Legs. Feet and Heels, and Under Medical Devices       Pink/red moist on coccyx area, no open area. Both breast moist, and  small bruise on left leg  Does the Patient have a Wound? No noted wound(s)       Jarek Prevention initiated by RN: No  Wound Care Orders initiated by RN: No    Pressure Injury (Stage 3,4, Unstageable, DTI, NWPT, and Complex wounds) if present, place Wound referral order by RN under : No    New Ostomies, if present place, Ostomy referral order under : No     Nurse 1 eSignature: Electronically signed by Anyi Jeronimo RN on 4/30/25 at 6:22 AM EDT    **SHARE this note so that the co-signing nurse can place an eSignature**    Nurse 2 eSignature: Electronically signed by Lucinda Malin RN on 4/30/25 at 5:58 AM EDT

## 2025-04-30 NOTE — PROGRESS NOTES
Treatment time: 3.5 hrs    Net UF: 3500 ml    Pre weight: 83 kg  Post weight: 79.5 kg  EDW: TBD    Access used: Rt CW TDC  Access function: Well tolerated, 350 BFR    Medications or blood products given: none    Regular outpatient schedule: TTS    Summary of response to treatment: Pt tolerated well. Pt remained stable throughout entire treatment and upon exiting the hemodialysis suite. Report given to Jazz Espinosa RN

## 2025-04-30 NOTE — PROGRESS NOTES
V2.0    [facility] Progress Note      Name:  Aimee Taylor /Age/Sex: 1935  (89 y.o. female)   MRN & CSN:  [id number] & [id number] Encounter Date/Time: 2025 6:39 PM EDT   Location:  [address] [place] [address] PCP: No primary care provider on file.     Attending:Enoch Mason*       Hospital Day: 2    Assessment and Recommendations     88yo female with history of ESRD on HD, HTN, cad, previous MI, hearing loss, gout, presenting from AS facility with chills, back pain, and currently being admditted with electrolyte disturbances and missing HD.       ESRD on HD  -HD as per Nephrology. Anticipate discharge to facility with HD services. Follow up with case management.     Moderate Hyponatremia - Continues to improve.   -Continue to monitor. BMP in the am.      Hyperkalemia - resolved  -2/2 esrd     Back pain     Elevated troponin - stable     Normocytic anemia - stable      Diet ADULT DIET; Regular; 4 carb choices (60 gm/meal); Low Sodium (2 gm); 1800 ml   DVT Prophylaxis [] Lovenox, [x]  Heparin, [] SCDs, [] Ambulation,  [] Eliquis, [] Xarelto  [] Coumadin   Code Status Full Code   Disposition From: AL  Expected Disposition: AL vs SNF  Estimated Date of Discharge: 1 DAY  Patient requires continued admission due to ESRD   Surrogate Decision Maker/ POA  On file, son     Personally reviewed Lab Studies and Imaging     Discussed management of the case with Nephrology. I reviewed palliative care's note.         Subjective:     Patient seen and evaluated at bedside. Complaining of back pain.       Review of Systems:      Pertinent positives and negatives discussed in HPI    Objective:     Intake/Output Summary (Last 24 hours) at 2025 1839  Last data filed at 2025 1823  Gross per 24 hour   Intake 400 ml   Output 50 ml   Net 350 ml      Vitals:   Vitals:    25 1312 25 1526 25 1532 25 1717   BP:  (!) 166/77  (!) 166/72   Pulse:  60 61 65   Resp: 17 18     Temp:  97.4 °F (36.3 °C)

## 2025-04-30 NOTE — PLAN OF CARE
Problem: Discharge Planning  Goal: Discharge to home or other facility with appropriate resources  4/30/2025 0922 by Charlotte Finnegan, RN  Outcome: Progressing  Flowsheets (Taken 4/30/2025 0922)  Discharge to home or other facility with appropriate resources:   Identify barriers to discharge with patient and caregiver   Arrange for needed discharge resources and transportation as appropriate   Arrange for interpreters to assist at discharge as needed   Identify discharge learning needs (meds, wound care, etc)     Problem: Pain  Goal: Verbalizes/displays adequate comfort level or baseline comfort level  Outcome: Progressing  Flowsheets (Taken 4/30/2025 0922)  Verbalizes/displays adequate comfort level or baseline comfort level:   Encourage patient to monitor pain and request assistance   Assess pain using appropriate pain scale   Administer analgesics based on type and severity of pain and evaluate response     Problem: Safety - Adult  Goal: Free from fall injury  4/30/2025 0922 by Charlotte Finnegan, RN  Outcome: Progressing  Flowsheets (Taken 4/30/2025 0922)  Free From Fall Injury:   Based on caregiver fall risk screen, instruct family/caregiver to ask for assistance with transferring infant if caregiver noted to have fall risk factors   Instruct family/caregiver on patient safety     Problem: ABCDS Injury Assessment  Goal: Absence of physical injury  Outcome: Progressing  Flowsheets (Taken 4/30/2025 0922)  Absence of Physical Injury: Implement safety measures based on patient assessment

## 2025-04-30 NOTE — PLAN OF CARE
Problem: Discharge Planning  Goal: Discharge to home or other facility with appropriate resources  Outcome: Progressing  Flowsheets (Taken 4/30/2025 0350)  Discharge to home or other facility with appropriate resources:   Arrange for needed discharge resources and transportation as appropriate   Identify barriers to discharge with patient and caregiver     Problem: Safety - Adult  Goal: Free from fall injury  Outcome: Progressing  Flowsheets (Taken 4/30/2025 0350)  Free From Fall Injury: Instruct family/caregiver on patient safety  Note: Pt free of falls this shift      Problem: Skin/Tissue Integrity  Goal: Skin integrity remains intact  Description: 1.  Monitor for areas of redness and/or skin breakdown2.  Assess vascular access sites hourly3.  Every 4-6 hours minimum:  Change oxygen saturation probe site4.  Every 4-6 hours:  If on nasal continuous positive airway pressure, respiratory therapy assess nares and determine need for appliance change or resting period  Outcome: Progressing  Flowsheets (Taken 4/30/2025 0350)  Skin Integrity Remains Intact:   Monitor for areas of redness and/or skin breakdown   Turn and reposition as indicated

## 2025-04-30 NOTE — PROGRESS NOTES
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  She reports back pain that is chronic.  She is a poor historian and unable to provide history.  She denies any chest pain or SOB.  She does not know her HD unit location.  Labs in ED revealed hyperkalemia with   -Dr. Quiñones from [place] nephrology was consulted-he forwarded consulted to [facility] as patient is followed by us    Subjective:    -pt seen and examined  -PMSHx and meds reviewed  -No family at bedside    No acute events ON  S/p HD yesterday    ROS: neg Cp/SOB/confused      PHYSICAL EXAM:    Vitals:    /67   Pulse 61   Temp 97.4 °F (36.3 °C) (Oral)   Resp 18   Ht 1.524 m (5')   Wt 79.8 kg (176 lb)   SpO2 100%   BMI 34.37 kg/m²   No intake/output data recorded.  No intake/output data recorded.    Physical Exam:  Gen: Elderly female, NAD  HEENT: anicteric, NC/AT  CV: RRR, +S1/S2  Lungs: Good respiratory effort, diminished in bases  Abd: S/NT +BS  Ext: trace edema, no cyanosis  Skin: Warm.  No rashes appreciated.  Neuro: to name only  Joints: No erythema noted over joints.  Access: right TDC    DATA:    CBC:   Lab Results   Component Value Date/Time    WBC 4.6 04/30/2025 03:19 AM    RBC 3.79 04/30/2025 03:19 AM    HGB 11.7 04/30/2025 03:19 AM    HCT 34.6 04/30/2025 03:19 AM    MCV 91.1 04/30/2025 03:19 AM    MCH 30.8 04/30/2025 03:19 AM    MCHC 33.8 04/30/2025 03:19 AM    RDW 14.8 04/30/2025 03:19 AM     04/30/2025 03:19 AM    MPV 9.8 04/30/2025 03:19 AM     BMP:    Lab Results   Component Value Date/Time     04/30/2025 03:19 AM    K 4.0 04/30/2025 03:19 AM    K 5.7

## 2025-05-01 LAB
ANION GAP SERPL CALCULATED.3IONS-SCNC: 15 MMOL/L (ref 3–16)
ANTI-XA UNFRAC HEPARIN: 0.13 IU/ML (ref 0.3–0.7)
BUN SERPL-MCNC: 35 MG/DL (ref 7–20)
CALCIUM SERPL-MCNC: 8.3 MG/DL (ref 8.3–10.6)
CHLORIDE SERPL-SCNC: 88 MMOL/L (ref 99–110)
CO2 SERPL-SCNC: 21 MMOL/L (ref 21–32)
CREAT SERPL-MCNC: 5.1 MG/DL (ref 0.6–1.2)
GFR SERPLBLD CREATININE-BSD FMLA CKD-EPI: 8 ML/MIN/{1.73_M2}
GLUCOSE BLD-MCNC: 74 MG/DL (ref 70–99)
GLUCOSE SERPL-MCNC: 67 MG/DL (ref 70–99)
PERFORMED ON: NORMAL
POTASSIUM SERPL-SCNC: 4.2 MMOL/L (ref 3.5–5.1)
SODIUM SERPL-SCNC: 124 MMOL/L (ref 136–145)

## 2025-05-01 PROCEDURE — 90935 HEMODIALYSIS ONE EVALUATION: CPT

## 2025-05-01 PROCEDURE — 80048 BASIC METABOLIC PNL TOTAL CA: CPT

## 2025-05-01 PROCEDURE — 2060000000 HC ICU INTERMEDIATE R&B

## 2025-05-01 PROCEDURE — 36415 COLL VENOUS BLD VENIPUNCTURE: CPT

## 2025-05-01 PROCEDURE — 6360000002 HC RX W HCPCS: Performed by: STUDENT IN AN ORGANIZED HEALTH CARE EDUCATION/TRAINING PROGRAM

## 2025-05-01 PROCEDURE — 6370000000 HC RX 637 (ALT 250 FOR IP): Performed by: STUDENT IN AN ORGANIZED HEALTH CARE EDUCATION/TRAINING PROGRAM

## 2025-05-01 PROCEDURE — 6370000000 HC RX 637 (ALT 250 FOR IP): Performed by: INTERNAL MEDICINE

## 2025-05-01 PROCEDURE — 85520 HEPARIN ASSAY: CPT

## 2025-05-01 PROCEDURE — 2500000003 HC RX 250 WO HCPCS: Performed by: STUDENT IN AN ORGANIZED HEALTH CARE EDUCATION/TRAINING PROGRAM

## 2025-05-01 RX ORDER — ALENDRONATE SODIUM 70 MG/1
70 TABLET ORAL
COMMUNITY

## 2025-05-01 RX ORDER — ACETAMINOPHEN 500 MG
1000 TABLET ORAL EVERY 8 HOURS PRN
Status: ON HOLD | COMMUNITY
End: 2025-05-10

## 2025-05-01 RX ORDER — SEVELAMER HYDROCHLORIDE 800 MG/1
800 TABLET, FILM COATED ORAL
COMMUNITY

## 2025-05-01 RX ORDER — FOLIC ACID 1 MG/1
1 TABLET ORAL DAILY
COMMUNITY
Start: 2024-12-12

## 2025-05-01 RX ORDER — LIDOCAINE 4 G/G
1 PATCH TOPICAL DAILY
COMMUNITY
Start: 2024-02-17

## 2025-05-01 RX ORDER — POLYETHYLENE GLYCOL 3350 17 G/17G
17 POWDER, FOR SOLUTION ORAL DAILY
COMMUNITY
Start: 2024-02-17

## 2025-05-01 RX ORDER — LOSARTAN POTASSIUM 100 MG/1
100 TABLET ORAL DAILY
Status: ON HOLD | COMMUNITY
Start: 2025-04-26 | End: 2025-05-10 | Stop reason: HOSPADM

## 2025-05-01 RX ORDER — MIDODRINE HYDROCHLORIDE 5 MG/1
5 TABLET ORAL 3 TIMES DAILY PRN
COMMUNITY

## 2025-05-01 RX ORDER — ALLOPURINOL 100 MG/1
100 TABLET ORAL 2 TIMES DAILY
Status: ON HOLD | COMMUNITY
Start: 2024-05-02 | End: 2025-05-10 | Stop reason: HOSPADM

## 2025-05-01 RX ORDER — SENNOSIDES A AND B 8.6 MG/1
1 TABLET, FILM COATED ORAL
COMMUNITY

## 2025-05-01 RX ORDER — ESCITALOPRAM OXALATE 10 MG/1
10 TABLET ORAL DAILY
COMMUNITY

## 2025-05-01 RX ORDER — LOSARTAN POTASSIUM 50 MG/1
50 TABLET ORAL DAILY
Status: DISCONTINUED | OUTPATIENT
Start: 2025-05-01 | End: 2025-05-10 | Stop reason: HOSPADM

## 2025-05-01 RX ADMIN — ATORVASTATIN CALCIUM 40 MG: 40 TABLET, FILM COATED ORAL at 20:19

## 2025-05-01 RX ADMIN — CARVEDILOL 25 MG: 25 TABLET, FILM COATED ORAL at 20:20

## 2025-05-01 RX ADMIN — SODIUM CHLORIDE, PRESERVATIVE FREE 10 ML: 5 INJECTION INTRAVENOUS at 12:47

## 2025-05-01 RX ADMIN — CARVEDILOL 25 MG: 25 TABLET, FILM COATED ORAL at 12:46

## 2025-05-01 RX ADMIN — HEPARIN SODIUM 5000 UNITS: 5000 INJECTION INTRAVENOUS; SUBCUTANEOUS at 22:11

## 2025-05-01 RX ADMIN — LOSARTAN POTASSIUM 50 MG: 50 TABLET, FILM COATED ORAL at 14:21

## 2025-05-01 RX ADMIN — OXYCODONE 5 MG: 5 TABLET ORAL at 00:00

## 2025-05-01 RX ADMIN — NIFEDIPINE 60 MG: 60 TABLET, EXTENDED RELEASE ORAL at 12:46

## 2025-05-01 RX ADMIN — HEPARIN SODIUM 5000 UNITS: 5000 INJECTION INTRAVENOUS; SUBCUTANEOUS at 06:00

## 2025-05-01 RX ADMIN — PRIMIDONE 50 MG: 50 TABLET ORAL at 17:42

## 2025-05-01 RX ADMIN — HEPARIN SODIUM 5000 UNITS: 5000 INJECTION INTRAVENOUS; SUBCUTANEOUS at 14:22

## 2025-05-01 RX ADMIN — SODIUM CHLORIDE, PRESERVATIVE FREE 10 ML: 5 INJECTION INTRAVENOUS at 20:57

## 2025-05-01 ASSESSMENT — PAIN DESCRIPTION - PAIN TYPE
TYPE: CHRONIC PAIN
TYPE: CHRONIC PAIN

## 2025-05-01 ASSESSMENT — PAIN SCALES - GENERAL
PAINLEVEL_OUTOF10: 3
PAINLEVEL_OUTOF10: 0
PAINLEVEL_OUTOF10: 3
PAINLEVEL_OUTOF10: 0
PAINLEVEL_OUTOF10: 6

## 2025-05-01 ASSESSMENT — PAIN DESCRIPTION - ORIENTATION
ORIENTATION: MID
ORIENTATION: MID

## 2025-05-01 ASSESSMENT — PAIN DESCRIPTION - DESCRIPTORS
DESCRIPTORS: ACHING;DISCOMFORT
DESCRIPTORS: ACHING;DISCOMFORT

## 2025-05-01 ASSESSMENT — PAIN DESCRIPTION - ONSET
ONSET: ON-GOING
ONSET: ON-GOING

## 2025-05-01 ASSESSMENT — PAIN DESCRIPTION - FREQUENCY
FREQUENCY: CONTINUOUS
FREQUENCY: CONTINUOUS

## 2025-05-01 ASSESSMENT — PAIN DESCRIPTION - LOCATION
LOCATION: BACK
LOCATION: BACK

## 2025-05-01 NOTE — PROGRESS NOTES
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  She reports back pain that is chronic.  She is a poor historian and unable to provide history.  She denies any chest pain or SOB.  She does not know her HD unit location.  Labs in ED revealed hyperkalemia with   -Dr. Quiñones from [place] nephrology was consulted-he forwarded consulted to [facility] as patient is followed by us    Subjective:    -pt seen and examined  -PMSHx and meds reviewed  -No family at bedside    No acute events ON  S/p HD yesterday  Had HD today    ROS: neg Cp/SOB/confused      PHYSICAL EXAM:    Vitals:    BP (!) 179/88   Pulse 60   Temp 97.8 °F (36.6 °C)   Resp 18   Ht 1.524 m (5')   Wt 79 kg (174 lb 2.6 oz)   SpO2 100%   BMI 34.01 kg/m²   I/O last 3 completed shifts:  In: 460 [P.O.:460]  Out: 50 [Urine:50]  No intake/output data recorded.    Physical Exam:  Gen: Elderly female, NAD  HEENT: anicteric, NC/AT  CV: RRR, +S1/S2  Lungs: Good respiratory effort, diminished in bases  Abd: S/NT +BS  Ext: trace edema, no cyanosis  Skin: Warm.  No rashes appreciated.  Neuro: to name only  Joints: No erythema noted over joints.  Access: right TDC    DATA:    CBC:   Lab Results   Component Value Date/Time    WBC 4.6 04/30/2025 03:19 AM    RBC 3.79 04/30/2025 03:19 AM    HGB 11.7 04/30/2025 03:19 AM    HCT 34.6 04/30/2025 03:19 AM    MCV 91.1 04/30/2025 03:19 AM    MCH 30.8 04/30/2025 03:19 AM    MCHC 33.8 04/30/2025 03:19 AM    RDW 14.8 04/30/2025 03:19 AM     04/30/2025 03:19 AM    MPV 9.8 04/30/2025 03:19 AM     BMP:    Lab Results   Component Value Date/Time     05/01/2025

## 2025-05-01 NOTE — CARE COORDINATION
1:33 PM  Spoke with MD and Nephro MD.    Patient will discharge back to [facility] with in house HD at the time of discharge.     Electronically signed by Brad Genao RN, CM on 5/1/2025 at 1:38 PM.  Phone: [phone]  Fax: [phone]

## 2025-05-01 NOTE — PLAN OF CARE
NEUROSURGERY PLAN OF CARE NOTE    RENZO TELLES  [id number]   1935 5/1/2025    Radiological Findings:  L2 fracture was seen on X-ray from 8/2024 and is unchanged on most recent CT Abdomen/Pelvis    Assessment:  Patient is a 89 y.o. y/o female with history of ESRD on HD, HTN, cad, previous MI, hearing loss, gout, presenting from AS facility with chills, back pain, and was admditted with electrolyte disturbances and missing HD. During CT abdomen/pelvis scan an L2 fracture was seen, and was seen on CT Lumbar spine at [facility] on 4/23 and on an XR Lumbar Spine on 8/13/2024, unchanged on all scans.    Recommendations:  No neurosurgical intervention indicated given age of fracture  Advance Activity/Diet per primary medicine recs  We will sign off.    DISPO: Per Primary Team     Electronically signed by: TONI Crenshaw CNP, APRN-CNP, 5/1/2025 3:48 PM  [phone]

## 2025-05-01 NOTE — PROGRESS NOTES
V2.0    [facility] Progress Note      Name:  Aimee Taylor /Age/Sex: 1935  (89 y.o. female)   MRN & CSN:  [id number] & [id number] Encounter Date/Time: 2025 6:39 PM EDT   Location:  [address] [place] [address] PCP: No primary care provider on file.     Attending:Enoch Mason*       Hospital Day: 3    Assessment and Recommendations     88yo female with history of ESRD on HD, HTN, cad, previous MI, hearing loss, gout, presenting from AS facility with chills, back pain, and currently being admditted with electrolyte disturbances and missing HD.       ESRD on HD  -HD as per Nephrology. Anticipate discharge to facility with HD services. Follow up with case management.     Moderate Hyponatremia -    -Continue to monitor. BMP in the am. Nephrology recommendations appreciated.      Hyperkalemia - resolved  -2/2 esrd     Back pain     Elevated troponin - stable     Normocytic anemia - stable      Diet ADULT DIET; Regular; Low Sodium (2 gm); 1800 ml   DVT Prophylaxis [] Lovenox, [x]  Heparin, [] SCDs, [] Ambulation,  [] Eliquis, [] Xarelto  [] Coumadin   Code Status Full Code   Disposition From: AL  Expected Disposition: AL vs SNF  Estimated Date of Discharge: 1 DAY  Patient requires continued admission due to ESRD   Surrogate Decision Maker/ POA  On file, son     Personally reviewed Lab Studies and Imaging     Discussed management of the case with Nephrology. I reviewed palliative care's note.     Subjective:     Patient seen and evaluated at bedside. Hard of hearing.        Review of Systems:      Pertinent positives and negatives discussed in HPI    Objective:     Intake/Output Summary (Last 24 hours) at 2025  Last data filed at 2025 1730  Gross per 24 hour   Intake 920 ml   Output 3100 ml   Net -2180 ml      Vitals:   Vitals:    25 1200 25 1215 25 1420 25 1730   BP: (!) 187/87 (!) 156/86 (!) 147/65 (!) 156/65   Pulse: 60 60 60 65   Resp: 18 20 18 20   Temp: 97.8 °F (36.6 °C)  5.7* 4.0 4.2   CL 88* 91* 88*   CO2 22 25 21   BUN 46* 21* 35*   CREATININE 6.7* 4.1* 5.1*   GLUCOSE 63* 64* 67*     Hepatic:   Recent Labs     04/29/25  0941   AST 37   ALT 24   BILITOT 0.3   ALKPHOS 112     Lipids: No results found for: \"CHOL\", \"HDL\", \"TRIG\"  Hemoglobin A1C: No results found for: \"LABA1C\"  TSH: No results found for: \"TSH\"  Troponin: No results found for: \"TROPONINT\"  Lactic Acid: No results for input(s): \"LACTA\" in the last 72 hours.  BNP:   Recent Labs     04/29/25  0941   PROBNP 31,516*     UA:  Lab Results   Component Value Date/Time    NITRU Negative 04/29/2025 11:23 AM    COLORU Yellow 04/29/2025 11:23 AM    PHUR 7.5 04/29/2025 11:23 AM    WBCUA 3-5 04/29/2025 11:23 AM    RBCUA 0-2 04/29/2025 11:23 AM    BACTERIA 1+ 04/29/2025 11:23 AM    CLARITYU Clear 04/29/2025 11:23 AM    LEUKOCYTESUR SMALL 04/29/2025 11:23 AM    UROBILINOGEN 0.2 04/29/2025 11:23 AM    BILIRUBINUR Negative 04/29/2025 11:23 AM    BLOODU TRACE-INTACT 04/29/2025 11:23 AM    GLUCOSEU Negative 04/29/2025 11:23 AM    KETUA Negative 04/29/2025 11:23 AM     Urine Cultures: No results found for: \"LABURIN\"  Blood Cultures: No results found for: \"BC\"  No results found for: \"BLOODCULT2\"  Organism: No results found for: \"ORG\"      Electronically signed by Enoch Mendez MD on 5/1/2025 at 7:31 PM

## 2025-05-01 NOTE — PLAN OF CARE
Problem: Discharge Planning  Goal: Discharge to home or other facility with appropriate resources  Outcome: Progressing  Flowsheets (Taken 4/30/2025 0922 by Charlotte Finnegan, MINNIE)  Discharge to home or other facility with appropriate resources:   Identify barriers to discharge with patient and caregiver   Arrange for needed discharge resources and transportation as appropriate   Arrange for interpreters to assist at discharge as needed   Identify discharge learning needs (meds, wound care, etc)     Problem: Pain  Goal: Verbalizes/displays adequate comfort level or baseline comfort level  Outcome: Progressing  Flowsheets (Taken 4/30/2025 0922 by Charlotte Finnegan, RN)  Verbalizes/displays adequate comfort level or baseline comfort level:   Encourage patient to monitor pain and request assistance   Assess pain using appropriate pain scale   Administer analgesics based on type and severity of pain and evaluate response     Problem: Safety - Adult  Goal: Free from fall injury  Outcome: Progressing  Flowsheets (Taken 4/30/2025 0922 by Charlotte Finnegan, RN)  Free From Fall Injury:   Based on caregiver fall risk screen, instruct family/caregiver to ask for assistance with transferring infant if caregiver noted to have fall risk factors   Instruct family/caregiver on patient safety     Problem: Skin/Tissue Integrity  Goal: Skin integrity remains intact  Description: 1.  Monitor for areas of redness and/or skin breakdown2.  Assess vascular access sites hourly3.  Every 4-6 hours minimum:  Change oxygen saturation probe site4.  Every 4-6 hours:  If on nasal continuous positive airway pressure, respiratory therapy assess nares and determine need for appliance change or resting period  Outcome: Progressing  Flowsheets (Taken 4/30/2025 0350)  Skin Integrity Remains Intact:   Monitor for areas of redness and/or skin breakdown   Turn and reposition as indicated

## 2025-05-01 NOTE — PLAN OF CARE
Problem: Pain  Goal: Verbalizes/displays adequate comfort level or baseline comfort level  5/1/2025 1622 by Liliana Almanzar, RN  Outcome: Progressing     Problem: Safety - Adult  Goal: Free from fall injury  5/1/2025 1622 by Liliana Almanzar, RN  Outcome: Progressing     Problem: ABCDS Injury Assessment  Goal: Absence of physical injury  Outcome: Progressing

## 2025-05-01 NOTE — PROGRESS NOTES
Clinical Pharmacy Progress Note  Medication History     Admit Date: 4/29/2025    List of current medications patient is taking is complete. Home Medication list in Epic updated to reflect changes noted below.    Source of information: med list from [facility]; list dated 4/29/25      Changes made to medication list:   Medications removed: (include reason, ex: therapy completed, patient no longer taking, etc.)  Azithromycin - not on facility med list  Ciprofloxacin - not on facility med list  Dicyclomine - not on facility med list  Tramadol   Medications added:   Acetaminophen  Allopurinol   Medication doses adjusted:   Aspirin -- changed to chewable tablet  Clonidine? 0.2mg/24h patch weekly  Sevelamer 800mg po TIDWM  Zofran  Escitalopram   Losartan   Midodrine   Miralax  Calcium + vitamin D  Folic acid   Lidocaine patch  Alendronate       Current Outpatient Medications   Medication Instructions    acetaminophen (TYLENOL) 1,000 mg, EVERY 8 HOURS PRN    alendronate (FOSAMAX) 70 mg, Oral, EVERY 7 DAYS    allopurinol (ZYLOPRIM) 100 mg, 2 TIMES DAILY    aspirin 81 mg, Oral, DAILY    atorvastatin (LIPITOR) 40 mg, Oral, DAILY    B Complex-C-Folic Acid (RENAL) 1 MG CAPS 1 capsule, Oral, DAILY    Calcium Carbonate-Vit D-Min (CALCIUM-VITAMIN D-MINERALS) 600-800 MG-UNIT CHEW 1 tablet, 2 times daily    carvedilol (COREG) 25 mg, Oral, 2 TIMES DAILY WITH MEALS    cloNIDine (CATAPRES) 0.2 MG/24HR PTWK 1 patch, TransDERmal, WEEKLY    escitalopram (LEXAPRO) 10 mg, Oral, DAILY    folic acid (FOLVITE) 1 mg, DAILY    HYDROcodone-acetaminophen (NORCO) 5-325 MG per tablet 1 tablet, Oral, EVERY 8 HOURS PRN    lidocaine 4 % external patch 1 patch, DAILY    losartan (COZAAR) 100 mg, DAILY    midodrine (PROAMATINE) 5 mg, Oral, 3 TIMES DAILY PRN    NIFEdipine (PROCARDIA XL) 90 mg, Oral, DAILY    ondansetron (ZOFRAN) 4 mg, Oral, EVERY 6 HOURS PRN    polyethylene glycol (GLYCOLAX) 17 g, DAILY    primidone (MYSOLINE) 50 mg, DAILY

## 2025-05-01 NOTE — PROGRESS NOTES
Palliative Care Chart Review  and Check in Note:     NAME:  Aimee Taylor  Admit Date: 4/29/2025  Hospital Day:  Hospital Day: 3   Current Code status: Full Code    Palliative care is continuing to following Ms. Taylor for symptom management,  and goals of care discussion as needed. Patient's chart reviewed today 5/1/25.      Have not received a call back overnight from any of pt's children. Called pt's daughter Cici rodriguez this morning, no answer, left VM with callback number.     The following are the currently established goals/code status, and Symptom management.     Goals of care:  Pt's children appear to be her NOK. No advance directives on file. I am not able to reach them at this time.     Code status:  Full code per documentation from pt's facility.     Discharge plan: Pt from [facility], likely to return when medically ready for discharge       TONI Beard CNP  05/01/25  8:36 AM

## 2025-05-01 NOTE — PROGRESS NOTES
Treatment time: 3.5 hrs    Net UF: 2500 ml    Pre weight: 79 kg  Post weight: 76.5 kg  EDW: TBD    Access used: Rt CW TDC  Access function: Well tolerated, 350 BFR    Medications or blood products given: none    Regular outpatient schedule: TTS    Summary of response to treatment: Pt tolerated well. Pt remained stable throughout entire treatment and upon exiting the hemodialysis suite. Report given to Liliana Almanzar RN

## 2025-05-02 LAB
ANION GAP SERPL CALCULATED.3IONS-SCNC: 12 MMOL/L (ref 3–16)
BASOPHILS # BLD: 0 K/UL (ref 0–0.2)
BASOPHILS NFR BLD: 0.8 %
BUN SERPL-MCNC: 17 MG/DL (ref 7–20)
CALCIUM SERPL-MCNC: 8.5 MG/DL (ref 8.3–10.6)
CHLORIDE SERPL-SCNC: 88 MMOL/L (ref 99–110)
CO2 SERPL-SCNC: 24 MMOL/L (ref 21–32)
CREAT SERPL-MCNC: 3.6 MG/DL (ref 0.6–1.2)
DEPRECATED RDW RBC AUTO: 14.2 % (ref 12.4–15.4)
EOSINOPHIL # BLD: 0.1 K/UL (ref 0–0.6)
EOSINOPHIL NFR BLD: 3.3 %
GFR SERPLBLD CREATININE-BSD FMLA CKD-EPI: 12 ML/MIN/{1.73_M2}
GLUCOSE SERPL-MCNC: 81 MG/DL (ref 70–99)
HBV SURFACE AB SERPL IA-ACNC: <3.5 MIU/ML
HBV SURFACE AG SERPL QL IA: NORMAL
HCT VFR BLD AUTO: 32.6 % (ref 36–48)
HGB BLD-MCNC: 11 G/DL (ref 12–16)
LYMPHOCYTES # BLD: 1 K/UL (ref 1–5.1)
LYMPHOCYTES NFR BLD: 23 %
MCH RBC QN AUTO: 31.2 PG (ref 26–34)
MCHC RBC AUTO-ENTMCNC: 33.8 G/DL (ref 31–36)
MCV RBC AUTO: 92.5 FL (ref 80–100)
MONOCYTES # BLD: 0.4 K/UL (ref 0–1.3)
MONOCYTES NFR BLD: 10.7 %
NEUTROPHILS # BLD: 2.6 K/UL (ref 1.7–7.7)
NEUTROPHILS NFR BLD: 62.2 %
PLATELET # BLD AUTO: 107 K/UL (ref 135–450)
PMV BLD AUTO: 10.4 FL (ref 5–10.5)
POTASSIUM SERPL-SCNC: 3.7 MMOL/L (ref 3.5–5.1)
RBC # BLD AUTO: 3.52 M/UL (ref 4–5.2)
SODIUM SERPL-SCNC: 124 MMOL/L (ref 136–145)
WBC # BLD AUTO: 4.2 K/UL (ref 4–11)

## 2025-05-02 PROCEDURE — 6360000002 HC RX W HCPCS: Performed by: INTERNAL MEDICINE

## 2025-05-02 PROCEDURE — 6370000000 HC RX 637 (ALT 250 FOR IP): Performed by: INTERNAL MEDICINE

## 2025-05-02 PROCEDURE — 6370000000 HC RX 637 (ALT 250 FOR IP): Performed by: STUDENT IN AN ORGANIZED HEALTH CARE EDUCATION/TRAINING PROGRAM

## 2025-05-02 PROCEDURE — 2500000003 HC RX 250 WO HCPCS: Performed by: STUDENT IN AN ORGANIZED HEALTH CARE EDUCATION/TRAINING PROGRAM

## 2025-05-02 PROCEDURE — 36415 COLL VENOUS BLD VENIPUNCTURE: CPT

## 2025-05-02 PROCEDURE — 87340 HEPATITIS B SURFACE AG IA: CPT

## 2025-05-02 PROCEDURE — 86706 HEP B SURFACE ANTIBODY: CPT

## 2025-05-02 PROCEDURE — 2060000000 HC ICU INTERMEDIATE R&B

## 2025-05-02 PROCEDURE — 80048 BASIC METABOLIC PNL TOTAL CA: CPT

## 2025-05-02 PROCEDURE — 85025 COMPLETE CBC W/AUTO DIFF WBC: CPT

## 2025-05-02 PROCEDURE — 6360000002 HC RX W HCPCS: Performed by: STUDENT IN AN ORGANIZED HEALTH CARE EDUCATION/TRAINING PROGRAM

## 2025-05-02 PROCEDURE — 90935 HEMODIALYSIS ONE EVALUATION: CPT

## 2025-05-02 RX ADMIN — HEPARIN SODIUM 3400 UNITS: 1000 INJECTION INTRAVENOUS; SUBCUTANEOUS at 15:05

## 2025-05-02 RX ADMIN — HEPARIN SODIUM 5000 UNITS: 5000 INJECTION INTRAVENOUS; SUBCUTANEOUS at 21:40

## 2025-05-02 RX ADMIN — ATORVASTATIN CALCIUM 40 MG: 40 TABLET, FILM COATED ORAL at 21:04

## 2025-05-02 RX ADMIN — NIFEDIPINE 60 MG: 60 TABLET, EXTENDED RELEASE ORAL at 08:27

## 2025-05-02 RX ADMIN — SODIUM CHLORIDE, PRESERVATIVE FREE 10 ML: 5 INJECTION INTRAVENOUS at 08:27

## 2025-05-02 RX ADMIN — CARVEDILOL 25 MG: 25 TABLET, FILM COATED ORAL at 17:50

## 2025-05-02 RX ADMIN — CARVEDILOL 25 MG: 25 TABLET, FILM COATED ORAL at 08:27

## 2025-05-02 RX ADMIN — HEPARIN SODIUM 5000 UNITS: 5000 INJECTION INTRAVENOUS; SUBCUTANEOUS at 06:37

## 2025-05-02 RX ADMIN — SODIUM CHLORIDE, PRESERVATIVE FREE 10 ML: 5 INJECTION INTRAVENOUS at 21:06

## 2025-05-02 RX ADMIN — OXYCODONE 5 MG: 5 TABLET ORAL at 02:36

## 2025-05-02 RX ADMIN — HEPARIN SODIUM 5000 UNITS: 5000 INJECTION INTRAVENOUS; SUBCUTANEOUS at 17:51

## 2025-05-02 RX ADMIN — PRIMIDONE 50 MG: 50 TABLET ORAL at 17:51

## 2025-05-02 RX ADMIN — LOSARTAN POTASSIUM 50 MG: 50 TABLET, FILM COATED ORAL at 08:27

## 2025-05-02 ASSESSMENT — PAIN SCALES - GENERAL
PAINLEVEL_OUTOF10: 8

## 2025-05-02 ASSESSMENT — PAIN DESCRIPTION - DESCRIPTORS
DESCRIPTORS: ACHING;DISCOMFORT
DESCRIPTORS: ACHING

## 2025-05-02 ASSESSMENT — PAIN DESCRIPTION - LOCATION
LOCATION: BACK
LOCATION: BACK

## 2025-05-02 ASSESSMENT — PAIN DESCRIPTION - ONSET
ONSET: ON-GOING
ONSET: ON-GOING

## 2025-05-02 ASSESSMENT — PAIN DESCRIPTION - FREQUENCY
FREQUENCY: CONTINUOUS
FREQUENCY: CONTINUOUS

## 2025-05-02 ASSESSMENT — PAIN DESCRIPTION - ORIENTATION
ORIENTATION: LOWER
ORIENTATION: LOWER;MID

## 2025-05-02 ASSESSMENT — PAIN DESCRIPTION - PAIN TYPE
TYPE: CHRONIC PAIN
TYPE: CHRONIC PAIN

## 2025-05-02 NOTE — PROGRESS NOTES
Treatment time: 3 hours    Net UF: 3 liters    Pre weight: 76.5 kg  Post weight: 73.5 kg  EDW: TBD.    Access used: Right CVC  Access function: Access site located on the right chest wall had clean dry and intact CVC dressing. No signs of infection noted. No redness, hematoma nor swelling was observed. No discharges was seen.    Medications or blood products given: heparin dwell    Regular outpatient schedule: M,W,F    Summary of response to treatment: 12:20: Treatment set at 3 liters for 3 hours as ordered via right CVC. Access site located on the right chest wall had clean dry and intact CVC dressing. No signs of infection noted. No redness, hematoma nor swelling was observed. No discharges was seen. Both ports had good flow. Cleaned site and changed dressing aseptically. Parameters set accordingly. Hooked to HD machine. Monitored from time to time.15:20: Treatment completed. Returned blood aseptically. HD tolerated well.     Copy of dialysis treatment record placed in chart, to be scanned into EMR.

## 2025-05-02 NOTE — DISCHARGE INSTRUCTIONS
Extra Heart Failure Education/ Tools/ Resources:     https://Promisec.com/publication/?t=419197   --- this is American Heart Association interactive Healthier Living with Heart Failure guidebook.  Please click hyperlink or copy / paste link into search bar. The QR Code is also available below. Use your mouse to scroll through the pages.  Lots of information about weight monitoring, diet tips, activity, meds, etc    Heart Failure Tools and Resources QR Code is below. It includes multiple resources to include symptom tracker, med tracker, further HF info, and access to a HF Support Network online Community    HF [place] Darryl  -- this is a free smart phone darryl available for iPhone and Android download.  Use your phone to track sodium / fluid intake, zone tool symptom tracking, weights, medications, etc. Click on this hyperlink  HF [place] Darryl   for QR code for easy download or the link is also found in the below HF Tools and Resources.      DASH (Dietary Approach to Stop Hypertension) diet --  https://www.nhlbi.nih.gov/education/dash-eating-plan -- this diet is a flexible eating plan that promotes heart healthy eating style.  Click on hyperlink or copy / paste link into search bar.  Lots of low sodium recipes and tips.    https://www.[employer]ds[employer].com/recipes  -- more free recipes

## 2025-05-02 NOTE — PLAN OF CARE
Problem: Discharge Planning  Goal: Discharge to home or other facility with appropriate resources  5/2/2025 0220 by Anyi Jeronimo RN  Outcome: Progressing  Flowsheets (Taken 4/30/2025 0922 by Charlotte Finnegan, MINNIE)  Discharge to home or other facility with appropriate resources:   Identify barriers to discharge with patient and caregiver   Arrange for needed discharge resources and transportation as appropriate   Arrange for interpreters to assist at discharge as needed   Identify discharge learning needs (meds, wound care, etc)     Problem: Pain  Goal: Verbalizes/displays adequate comfort level or baseline comfort level  5/1/2025 1622 by Liliana Almanzar RN  Outcome: Progressing     Problem: Safety - Adult  Goal: Free from fall injury  5/2/2025 0220 by Anyi Jeronimo RN  Outcome: Progressing  Flowsheets (Taken 4/30/2025 0922 by Charlotte Finnegan, RN)  Free From Fall Injury:   Based on caregiver fall risk screen, instruct family/caregiver to ask for assistance with transferring infant if caregiver noted to have fall risk factors   Instruct family/caregiver on patient safety     Problem: Skin/Tissue Integrity  Goal: Skin integrity remains intact  Description: 1.  Monitor for areas of redness and/or skin breakdown2.  Assess vascular access sites hourly3.  Every 4-6 hours minimum:  Change oxygen saturation probe site4.  Every 4-6 hours:  If on nasal continuous positive airway pressure, respiratory therapy assess nares and determine need for appliance change or resting period  5/2/2025 0220 by Anyi Jeronimo RN  Outcome: Progressing  Flowsheets (Taken 4/30/2025 0350)  Skin Integrity Remains Intact:   Monitor for areas of redness and/or skin breakdown   Turn and reposition as indicated  Note: Pt is a q2 turn

## 2025-05-02 NOTE — PROGRESS NOTES
Pt will return back to [facility], and currently has out patient dialysis MWF at [facility], confirmed with LTC facility.  MRI ordered for today - pending results, pt will be discharged back to facility tomorrow.   Electronically signed by Juliana Pearson on 5/2/2025 at 2:21 PM

## 2025-05-02 NOTE — PROGRESS NOTES
Returned Ronny phone call at , left message stating calling to give up date and needed to ask some questions for MRI screen. Left Unit phone number to call back.    Electronically signed by France Phan RN on 5/2/2025 at 4:40 PM

## 2025-05-02 NOTE — PROGRESS NOTES
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  She reports back pain that is chronic.  She is a poor historian and unable to provide history.  She denies any chest pain or SOB.  She does not know her HD unit location.  Labs in ED revealed hyperkalemia with   -Dr. Quiñones from [place] neph[facility] was consulted-he forwarded consulted to [facility] as patient is followed by us    Subjective:    -pt seen and examined  -PMSHx and meds reviewed  -No family at bedside    No acute events ON  Confused  Planned HD today to resume MWF schedule    ROS: neg Cp/SOB/confused      PHYSICAL EXAM:    Vitals:    BP (!) 163/79   Pulse 60   Temp 97.4 °F (36.3 °C) (Oral)   Resp 18   Ht 1.524 m (5')   Wt 76.5 kg (168 lb 10.4 oz)   SpO2 97%   BMI 32.94 kg/m²   I/O last 3 completed shifts:  In: 1040 [P.O.:540]  Out: 3150 [Urine:150]  I/O this shift:  In: 240 [P.O.:240]  Out: 0     Physical Exam:  Gen: Elderly female, NAD  HEENT: anicteric, NC/AT  CV: RRR, +S1/S2  Lungs: Good respiratory effort, diminished in bases  Abd: S/NT +BS  Ext: trace edema, no cyanosis  Skin: Warm.  No rashes appreciated.  Neuro: to name only  Joints: No erythema noted over joints.  Access: right TDC    DATA:    CBC:   Lab Results   Component Value Date/Time    WBC 4.2 05/02/2025 03:40 AM    RBC 3.52 05/02/2025 03:40 AM    HGB 11.0 05/02/2025 03:40 AM    HCT 32.6 05/02/2025 03:40 AM    MCV 92.5 05/02/2025 03:40 AM    MCH 31.2 05/02/2025 03:40 AM    MCHC 33.8 05/02/2025 03:40 AM    RDW 14.2 05/02/2025 03:40 AM     05/02/2025 03:40 AM    MPV 10.4 05/02/2025 03:40 AM     BMP:    Lab Results

## 2025-05-02 NOTE — PROGRESS NOTES
V2.0    [facility] Progress Note      Name:  Aimee Taylor /Age/Sex: 1935  (89 y.o. female)   MRN & CSN:  [id number] & [id number] Encounter Date/Time: 2025 6:39 PM EDT   Location:  [address] [place] [address] PCP: No primary care provider on file.     Attending:Enoch Mason*       Hospital Day: 4    Assessment and Recommendations     88yo female with history of ESRD on HD, HTN, cad, previous MI, hearing loss, gout, presenting from AS facility with chills, back pain, and currently being admditted with electrolyte disturbances and missing HD.       ESRD on HD  -HD as per Nephrology. Anticipate discharge to facility with HD services. Follow up with case management.     Moderate Hyponatremia -  Fluctuating  -Continue to monitor. BMP in the am. Nephrology recommendations appreciated.      Hyperkalemia - resolved  -2/2 esrd     Back pain  -Check T-spine and L-spine MRI     Elevated troponin - stable     Normocytic anemia - stable      Diet ADULT DIET; Regular; Low Fat/Low Chol/High Fiber/2 gm Na; 1800 ml   DVT Prophylaxis [] Lovenox, [x]  Heparin, [] SCDs, [] Ambulation,  [] Eliquis, [] Xarelto  [] Coumadin   Code Status Full Code   Disposition From: AL  Expected Disposition: AL vs SNF  Estimated Date of Discharge: 1 DAY  Patient requires continued admission due to ESRD   Surrogate Decision Maker/ POA  On file, son     Personally reviewed Lab Studies and Imaging     Discussed management of the case with Nephrology. I reviewed palliative care's note.     Subjective:     Patient seen and evaluated at bedside. Hard of hearing.        Review of Systems:      Pertinent positives and negatives discussed in HPI    Objective:     Intake/Output Summary (Last 24 hours) at 2025 1353  Last data filed at 2025 0944  Gross per 24 hour   Intake 480 ml   Output 150 ml   Net 330 ml      Vitals:   Vitals:    25 0400 25 0827 25 1210 25 1220   BP:  (!) 163/79 138/70 (!) 142/60   Pulse: 63 60     Resp:

## 2025-05-03 LAB
ANION GAP SERPL CALCULATED.3IONS-SCNC: 11 MMOL/L (ref 3–16)
BASOPHILS # BLD: 0 K/UL (ref 0–0.2)
BASOPHILS NFR BLD: 0.7 %
BUN SERPL-MCNC: 11 MG/DL (ref 7–20)
CALCIUM SERPL-MCNC: 9.1 MG/DL (ref 8.3–10.6)
CHLORIDE SERPL-SCNC: 102 MMOL/L (ref 99–110)
CO2 SERPL-SCNC: 25 MMOL/L (ref 21–32)
CREAT SERPL-MCNC: 3 MG/DL (ref 0.6–1.2)
DEPRECATED RDW RBC AUTO: 14.6 % (ref 12.4–15.4)
EOSINOPHIL # BLD: 0.1 K/UL (ref 0–0.6)
EOSINOPHIL NFR BLD: 1.7 %
GFR SERPLBLD CREATININE-BSD FMLA CKD-EPI: 15 ML/MIN/{1.73_M2}
GLUCOSE SERPL-MCNC: 89 MG/DL (ref 70–99)
HCT VFR BLD AUTO: 34.6 % (ref 36–48)
HGB BLD-MCNC: 11.8 G/DL (ref 12–16)
LYMPHOCYTES # BLD: 1.6 K/UL (ref 1–5.1)
LYMPHOCYTES NFR BLD: 29.3 %
MCH RBC QN AUTO: 31.4 PG (ref 26–34)
MCHC RBC AUTO-ENTMCNC: 34 G/DL (ref 31–36)
MCV RBC AUTO: 92.4 FL (ref 80–100)
MONOCYTES # BLD: 0.9 K/UL (ref 0–1.3)
MONOCYTES NFR BLD: 16.8 %
NEUTROPHILS # BLD: 2.8 K/UL (ref 1.7–7.7)
NEUTROPHILS NFR BLD: 51.5 %
PLATELET # BLD AUTO: 101 K/UL (ref 135–450)
PMV BLD AUTO: 10.5 FL (ref 5–10.5)
POTASSIUM SERPL-SCNC: 4.9 MMOL/L (ref 3.5–5.1)
RBC # BLD AUTO: 3.75 M/UL (ref 4–5.2)
SODIUM SERPL-SCNC: 138 MMOL/L (ref 136–145)
WBC # BLD AUTO: 5.4 K/UL (ref 4–11)

## 2025-05-03 PROCEDURE — 6360000002 HC RX W HCPCS: Performed by: STUDENT IN AN ORGANIZED HEALTH CARE EDUCATION/TRAINING PROGRAM

## 2025-05-03 PROCEDURE — 2500000003 HC RX 250 WO HCPCS: Performed by: STUDENT IN AN ORGANIZED HEALTH CARE EDUCATION/TRAINING PROGRAM

## 2025-05-03 PROCEDURE — 6370000000 HC RX 637 (ALT 250 FOR IP): Performed by: STUDENT IN AN ORGANIZED HEALTH CARE EDUCATION/TRAINING PROGRAM

## 2025-05-03 PROCEDURE — 36415 COLL VENOUS BLD VENIPUNCTURE: CPT

## 2025-05-03 PROCEDURE — 2060000000 HC ICU INTERMEDIATE R&B

## 2025-05-03 PROCEDURE — 85025 COMPLETE CBC W/AUTO DIFF WBC: CPT

## 2025-05-03 PROCEDURE — 80048 BASIC METABOLIC PNL TOTAL CA: CPT

## 2025-05-03 RX ADMIN — HEPARIN SODIUM 5000 UNITS: 5000 INJECTION INTRAVENOUS; SUBCUTANEOUS at 15:02

## 2025-05-03 RX ADMIN — SODIUM CHLORIDE, PRESERVATIVE FREE 10 ML: 5 INJECTION INTRAVENOUS at 21:11

## 2025-05-03 RX ADMIN — HEPARIN SODIUM 5000 UNITS: 5000 INJECTION INTRAVENOUS; SUBCUTANEOUS at 22:00

## 2025-05-03 RX ADMIN — ATORVASTATIN CALCIUM 40 MG: 40 TABLET, FILM COATED ORAL at 21:05

## 2025-05-03 RX ADMIN — CARVEDILOL 25 MG: 25 TABLET, FILM COATED ORAL at 18:10

## 2025-05-03 RX ADMIN — OXYCODONE 5 MG: 5 TABLET ORAL at 12:26

## 2025-05-03 RX ADMIN — PRIMIDONE 50 MG: 50 TABLET ORAL at 18:10

## 2025-05-03 RX ADMIN — SODIUM CHLORIDE, PRESERVATIVE FREE 10 ML: 5 INJECTION INTRAVENOUS at 08:09

## 2025-05-03 RX ADMIN — HEPARIN SODIUM 5000 UNITS: 5000 INJECTION INTRAVENOUS; SUBCUTANEOUS at 05:27

## 2025-05-03 ASSESSMENT — PAIN DESCRIPTION - DESCRIPTORS: DESCRIPTORS: ACHING;DISCOMFORT

## 2025-05-03 ASSESSMENT — PAIN DESCRIPTION - ONSET: ONSET: GRADUAL

## 2025-05-03 ASSESSMENT — PAIN SCALES - GENERAL
PAINLEVEL_OUTOF10: 7
PAINLEVEL_OUTOF10: 5

## 2025-05-03 ASSESSMENT — PAIN DESCRIPTION - PAIN TYPE: TYPE: CHRONIC PAIN

## 2025-05-03 ASSESSMENT — PAIN SCALES - WONG BAKER: WONGBAKER_NUMERICALRESPONSE: NO HURT

## 2025-05-03 ASSESSMENT — PAIN DESCRIPTION - ORIENTATION: ORIENTATION: LOWER;MID

## 2025-05-03 ASSESSMENT — PAIN DESCRIPTION - FREQUENCY: FREQUENCY: INTERMITTENT

## 2025-05-03 ASSESSMENT — PAIN - FUNCTIONAL ASSESSMENT: PAIN_FUNCTIONAL_ASSESSMENT: PREVENTS OR INTERFERES SOME ACTIVE ACTIVITIES AND ADLS

## 2025-05-03 ASSESSMENT — PAIN DESCRIPTION - LOCATION: LOCATION: BACK

## 2025-05-03 NOTE — PROGRESS NOTES
NephrologyProgress Note  [phone]  [phone]   [email or web address]        Reason for Consult:  ESKD    HISTORY OF PRESENT ILLNESS:                This is a patient with significant past medical history of ESKD on HD, MWF at [facility], dementia, HTN, CAD/CHF, s/p CABG, chronic back pain, who was recently admitted at [facility]-presented to ED who presented to ED from NH after refusing to go to HD yesterday.  She reports back pain that is chronic.  She is a poor historian and unable to provide history.  She denies any chest pain or SOB.  She does not know her HD unit location.  Labs in ED revealed hyperkalemia with   -Dr. Quiñones from [facility] was consulted-he forwarded consulted to [facility] as patient is followed by us    Subjective:    -pt seen and examined  -PMSHx and meds reviewed  -No family at bedside    No acute events ON  Confused-MRI pending      ROS: neg Cp/SOB/confused      PHYSICAL EXAM:    Vitals:    /64   Pulse 60   Temp 98.2 °F (36.8 °C) (Oral)   Resp 16   Ht 1.524 m (5')   Wt 76.5 kg (168 lb 10.4 oz)   SpO2 97%   BMI 32.94 kg/m²   I/O last 3 completed shifts:  In: 840 [P.O.:840]  Out: 50 [Urine:50]  I/O this shift:  In: 220 [P.O.:220]  Out: -     Physical Exam:  Gen: Elderly female, NAD  HEENT: anicteric, NC/AT  CV: RRR, +S1/S2  Lungs: Good respiratory effort, diminished in bases  Abd: S/NT +BS  Ext: trace edema, no cyanosis  Skin: Warm.  No rashes appreciated.  Neuro: to name only  Joints: No erythema noted over joints.  Access: right TDC    DATA:    CBC:   Lab Results   Component Value Date/Time    WBC 5.4 05/03/2025 06:11 AM    RBC 3.75 05/03/2025 06:11 AM    HGB 11.8 05/03/2025 06:11 AM    HCT 34.6 05/03/2025 06:11 AM    MCV 92.4 05/03/2025 06:11 AM    MCH 31.4 05/03/2025 06:11 AM    MCHC 34.0 05/03/2025 06:11 AM    RDW 14.6 05/03/2025 06:11 AM     05/03/2025 06:11 AM    MPV 10.5 05/03/2025 06:11 AM     BMP:    Lab Results   Component Value Date/Time

## 2025-05-03 NOTE — PROGRESS NOTES
V2.0    [facility] Progress Note      Name:  Aimee Taylor /Age/Sex: 1935  (89 y.o. female)   MRN & CSN:  [id number] & [id number] Encounter Date/Time: 5/3/2025 6:39 PM EDT   Location:  [address] [place] [address] PCP: No primary care provider on file.     Attending:Enoch Mason*       Hospital Day: 5    Assessment and Recommendations     88yo female with history of ESRD on HD, HTN, cad, previous MI, hearing loss, gout, presenting from AS facility with chills, back pain, and currently being admditted with electrolyte disturbances and missing HD.       ESRD on HD  -HD as per Nephrology. Anticipate discharge to facility with HD services. Follow up with case management.     Moderate Hyponatremia -  Fluctuating  -Continue to monitor. BMP in the am. Nephrology recommendations appreciated.      Hyperkalemia - resolved  -2/2 esrd     Back pain  -Check T-spine and L-spine MRI. Results still pending.      Elevated troponin - stable     Normocytic anemia - stable      Diet ADULT DIET; Regular; Low Fat/Low Chol/High Fiber/2 gm Na; 1800 ml   DVT Prophylaxis [] Lovenox, [x]  Heparin, [] SCDs, [] Ambulation,  [] Eliquis, [] Xarelto  [] Coumadin   Code Status Full Code   Disposition From: AL  Expected Disposition: AL vs SNF  Estimated Date of Discharge: 1 DAY  Patient requires continued admission due to ESRD   Surrogate Decision Maker/ POA  On file, son     Personally reviewed Lab Studies and Imaging     Discussed management of the case with Nephrology. I reviewed palliative care's note.     Subjective:     Patient seen and evaluated at bedside. Sleeping.       Review of Systems:      Pertinent positives and negatives discussed in HPI    Objective:     Intake/Output Summary (Last 24 hours) at 5/3/2025 1224  Last data filed at 5/3/2025 1000  Gross per 24 hour   Intake 700 ml   Output --   Net 700 ml      Vitals:   Vitals:    25 2314 25 0309 25 0750 25 1218   BP: (!) 100/52 (!) 148/54 120/78 124/64

## 2025-05-03 NOTE — PLAN OF CARE
Problem: Discharge Planning  Goal: Discharge to home or other facility with appropriate resources  Outcome: Progressing  Flowsheets (Taken 5/3/2025 1930)  Discharge to home or other facility with appropriate resources:   Identify barriers to discharge with patient and caregiver   Arrange for needed discharge resources and transportation as appropriate   Identify discharge learning needs (meds, wound care, etc)   Arrange for interpreters to assist at discharge as needed   Refer to discharge planning if patient needs post-hospital services based on physician order or complex needs related to functional status, cognitive ability or social support system     Problem: Pain  Goal: Verbalizes/displays adequate comfort level or baseline comfort level  Outcome: Progressing  Flowsheets (Taken 5/3/2025 1930)  Verbalizes/displays adequate comfort level or baseline comfort level:   Assess pain using appropriate pain scale   Encourage patient to monitor pain and request assistance   Administer analgesics based on type and severity of pain and evaluate response   Implement non-pharmacological measures as appropriate and evaluate response   Consider cultural and social influences on pain and pain management   Notify Licensed Independent Practitioner if interventions unsuccessful or patient reports new pain     Problem: Safety - Adult  Goal: Free from fall injury  Outcome: Progressing  Flowsheets (Taken 5/3/2025 1930)  Free From Fall Injury:   Instruct family/caregiver on patient safety   Based on caregiver fall risk screen, instruct family/caregiver to ask for assistance with transferring infant if caregiver noted to have fall risk factors     Problem: ABCDS Injury Assessment  Goal: Absence of physical injury  Outcome: Progressing  Flowsheets (Taken 5/3/2025 1930)  Absence of Physical Injury: Implement safety measures based on patient assessment     Problem: Cardiovascular - Adult  Goal: Maintains optimal cardiac output and

## 2025-05-03 NOTE — PROGRESS NOTES
Spoke with son regarding MRI questionnaire, he did not feel qualified in answering these questions and asked to reach out to sister, patient's daughter, Cici. Will follow up.    Electronically signed by Sofia Powers RN on 5/2/2025 at 8:02 PM

## 2025-05-03 NOTE — PLAN OF CARE
Problem: Discharge Planning  Goal: Discharge to home or other facility with appropriate resources  Outcome: Progressing  Flowsheets  Taken 5/2/2025 1604  Discharge to home or other facility with appropriate resources:   Identify barriers to discharge with patient and caregiver   Arrange for needed discharge resources and transportation as appropriate   Identify discharge learning needs (meds, wound care, etc)   Refer to discharge planning if patient needs post-hospital services based on physician order or complex needs related to functional status, cognitive ability or social support system  Taken 5/2/2025 0837  Discharge to home or other facility with appropriate resources:   Identify barriers to discharge with patient and caregiver   Arrange for needed discharge resources and transportation as appropriate   Identify discharge learning needs (meds, wound care, etc)   Refer to discharge planning if patient needs post-hospital services based on physician order or complex needs related to functional status, cognitive ability or social support system  Note: Progressing to discharge tomorrow back to SNF.     Problem: Pain  Goal: Verbalizes/displays adequate comfort level or baseline comfort level  Outcome: Progressing  Flowsheets (Taken 4/30/2025 0922 by Charlotte Finnegan RN)  Verbalizes/displays adequate comfort level or baseline comfort level:   Encourage patient to monitor pain and request assistance   Assess pain using appropriate pain scale   Administer analgesics based on type and severity of pain and evaluate response  Note: Reviewed pain scale and pain medication options this shift.      Problem: Safety - Adult  Goal: Free from fall injury  Outcome: Progressing  Flowsheets  Taken 5/2/2025 2004  Free From Fall Injury: Instruct family/caregiver on patient safety  Taken 5/2/2025 2002  Free From Fall Injury: Instruct family/caregiver on patient safety  Note: Pt complaint with all safety measures, bed alarm, gripper

## 2025-05-04 LAB
BASOPHILS # BLD: 0 K/UL (ref 0–0.2)
BASOPHILS NFR BLD: 0.5 %
DEPRECATED RDW RBC AUTO: 15 % (ref 12.4–15.4)
EOSINOPHIL # BLD: 0.1 K/UL (ref 0–0.6)
EOSINOPHIL NFR BLD: 2.8 %
HCT VFR BLD AUTO: 34.2 % (ref 36–48)
HGB BLD-MCNC: 11.3 G/DL (ref 12–16)
LYMPHOCYTES # BLD: 2.1 K/UL (ref 1–5.1)
LYMPHOCYTES NFR BLD: 42.8 %
MCH RBC QN AUTO: 31 PG (ref 26–34)
MCHC RBC AUTO-ENTMCNC: 33.2 G/DL (ref 31–36)
MCV RBC AUTO: 93.6 FL (ref 80–100)
MONOCYTES # BLD: 0.6 K/UL (ref 0–1.3)
MONOCYTES NFR BLD: 12 %
NEUTROPHILS # BLD: 2 K/UL (ref 1.7–7.7)
NEUTROPHILS NFR BLD: 41.9 %
PLATELET # BLD AUTO: 105 K/UL (ref 135–450)
PMV BLD AUTO: 10.1 FL (ref 5–10.5)
RBC # BLD AUTO: 3.66 M/UL (ref 4–5.2)
WBC # BLD AUTO: 4.8 K/UL (ref 4–11)

## 2025-05-04 PROCEDURE — 6370000000 HC RX 637 (ALT 250 FOR IP): Performed by: NURSE PRACTITIONER

## 2025-05-04 PROCEDURE — 6370000000 HC RX 637 (ALT 250 FOR IP): Performed by: INTERNAL MEDICINE

## 2025-05-04 PROCEDURE — 2500000003 HC RX 250 WO HCPCS: Performed by: STUDENT IN AN ORGANIZED HEALTH CARE EDUCATION/TRAINING PROGRAM

## 2025-05-04 PROCEDURE — 2060000000 HC ICU INTERMEDIATE R&B

## 2025-05-04 PROCEDURE — 6370000000 HC RX 637 (ALT 250 FOR IP): Performed by: STUDENT IN AN ORGANIZED HEALTH CARE EDUCATION/TRAINING PROGRAM

## 2025-05-04 PROCEDURE — 36415 COLL VENOUS BLD VENIPUNCTURE: CPT

## 2025-05-04 PROCEDURE — 85025 COMPLETE CBC W/AUTO DIFF WBC: CPT

## 2025-05-04 PROCEDURE — 6360000002 HC RX W HCPCS: Performed by: STUDENT IN AN ORGANIZED HEALTH CARE EDUCATION/TRAINING PROGRAM

## 2025-05-04 RX ORDER — LIDOCAINE 4 G/G
1 PATCH TOPICAL DAILY PRN
Status: DISCONTINUED | OUTPATIENT
Start: 2025-05-04 | End: 2025-05-07

## 2025-05-04 RX ORDER — CARVEDILOL 6.25 MG/1
6.25 TABLET ORAL 2 TIMES DAILY WITH MEALS
Status: DISCONTINUED | OUTPATIENT
Start: 2025-05-04 | End: 2025-05-10

## 2025-05-04 RX ADMIN — ACETAMINOPHEN 650 MG: 325 TABLET ORAL at 20:48

## 2025-05-04 RX ADMIN — SODIUM CHLORIDE, PRESERVATIVE FREE 10 ML: 5 INJECTION INTRAVENOUS at 20:36

## 2025-05-04 RX ADMIN — HEPARIN SODIUM 5000 UNITS: 5000 INJECTION INTRAVENOUS; SUBCUTANEOUS at 14:00

## 2025-05-04 RX ADMIN — HEPARIN SODIUM 5000 UNITS: 5000 INJECTION INTRAVENOUS; SUBCUTANEOUS at 21:39

## 2025-05-04 RX ADMIN — OXYCODONE 5 MG: 5 TABLET ORAL at 16:23

## 2025-05-04 RX ADMIN — PRIMIDONE 50 MG: 50 TABLET ORAL at 16:19

## 2025-05-04 RX ADMIN — HEPARIN SODIUM 5000 UNITS: 5000 INJECTION INTRAVENOUS; SUBCUTANEOUS at 06:16

## 2025-05-04 RX ADMIN — SODIUM CHLORIDE, PRESERVATIVE FREE 10 ML: 5 INJECTION INTRAVENOUS at 10:34

## 2025-05-04 RX ADMIN — CARVEDILOL 6.25 MG: 6.25 TABLET, FILM COATED ORAL at 16:19

## 2025-05-04 RX ADMIN — ATORVASTATIN CALCIUM 40 MG: 40 TABLET, FILM COATED ORAL at 20:35

## 2025-05-04 ASSESSMENT — PAIN SCALES - GENERAL
PAINLEVEL_OUTOF10: 0
PAINLEVEL_OUTOF10: 7
PAINLEVEL_OUTOF10: 8
PAINLEVEL_OUTOF10: 0
PAINLEVEL_OUTOF10: 8

## 2025-05-04 ASSESSMENT — PAIN SCALES - WONG BAKER
WONGBAKER_NUMERICALRESPONSE: NO HURT

## 2025-05-04 ASSESSMENT — PAIN DESCRIPTION - ONSET: ONSET: GRADUAL

## 2025-05-04 ASSESSMENT — PAIN DESCRIPTION - LOCATION
LOCATION: BACK
LOCATION: BACK
LOCATION: RIB CAGE

## 2025-05-04 ASSESSMENT — PAIN DESCRIPTION - ORIENTATION
ORIENTATION: LOWER
ORIENTATION: LOWER
ORIENTATION: RIGHT

## 2025-05-04 ASSESSMENT — PAIN DESCRIPTION - DESCRIPTORS
DESCRIPTORS: ACHING
DESCRIPTORS: ACHING;DISCOMFORT
DESCRIPTORS: ACHING

## 2025-05-04 ASSESSMENT — PAIN - FUNCTIONAL ASSESSMENT
PAIN_FUNCTIONAL_ASSESSMENT: PREVENTS OR INTERFERES SOME ACTIVE ACTIVITIES AND ADLS
PAIN_FUNCTIONAL_ASSESSMENT: PREVENTS OR INTERFERES SOME ACTIVE ACTIVITIES AND ADLS
PAIN_FUNCTIONAL_ASSESSMENT: ACTIVITIES ARE NOT PREVENTED

## 2025-05-04 ASSESSMENT — PAIN DESCRIPTION - PAIN TYPE: TYPE: CHRONIC PAIN

## 2025-05-04 ASSESSMENT — PAIN DESCRIPTION - FREQUENCY: FREQUENCY: INTERMITTENT

## 2025-05-04 NOTE — PLAN OF CARE
Problem: Discharge Planning  Goal: Discharge to home or other facility with appropriate resources  Outcome: Progressing  Flowsheets (Taken 5/4/2025 1707)  Discharge to home or other facility with appropriate resources:   Identify barriers to discharge with patient and caregiver   Arrange for needed discharge resources and transportation as appropriate   Identify discharge learning needs (meds, wound care, etc)   Arrange for interpreters to assist at discharge as needed   Refer to discharge planning if patient needs post-hospital services based on physician order or complex needs related to functional status, cognitive ability or social support system     Problem: Pain  Goal: Verbalizes/displays adequate comfort level or baseline comfort level  Outcome: Progressing  Flowsheets (Taken 5/4/2025 1707)  Verbalizes/displays adequate comfort level or baseline comfort level:   Encourage patient to monitor pain and request assistance   Assess pain using appropriate pain scale   Administer analgesics based on type and severity of pain and evaluate response   Implement non-pharmacological measures as appropriate and evaluate response   Consider cultural and social influences on pain and pain management   Notify Licensed Independent Practitioner if interventions unsuccessful or patient reports new pain     Problem: Safety - Adult  Goal: Free from fall injury  Outcome: Progressing  Flowsheets (Taken 5/4/2025 1707)  Free From Fall Injury:   Based on caregiver fall risk screen, instruct family/caregiver to ask for assistance with transferring infant if caregiver noted to have fall risk factors   Instruct family/caregiver on patient safety     Problem: ABCDS Injury Assessment  Goal: Absence of physical injury  Outcome: Progressing  Flowsheets (Taken 5/4/2025 1707)  Absence of Physical Injury: Implement safety measures based on patient assessment     Problem: Cardiovascular - Adult  Goal: Maintains optimal cardiac output and

## 2025-05-04 NOTE — PROGRESS NOTES
V2.0    [facility] [place]      Name:  Aimee Taylor /Age/Sex: 1935  (89 y.o. female)   MRN & CSN:  [id number] & [id number] Encounter Date/Time: 2025 6:39 PM EDT   Location:  [address] [place] [address] PCP: No primary care provider on file.     Attending:Enoch Mason*       Hospital Day: 6    -MRI T/L spine and if no significant findings can discahrge back to facility. Due for HD run tomorrow.     Assessment and Recommendations     89yo female with history of ESRD on HD, HTN, cad, previous MI, hearing loss, gout, presenting from AS facility with chills, back pain, and currently being admditted with electrolyte disturbances and missing HD.  Also with intractable back pain with family members requesting back MRI. Currently pending T/L sine MRI.      ESRD on HD  -HD as per Nephrology. Anticipate discharge to facility with HD services. Follow up with case management. Run tomorrow.     Moderate Hyponatremia -  Fluctuating  -Continue to monitor. BMP in the am. Nephrology recommendations appreciated.      Hyperkalemia - resolved  -2/2 esrd     Back pain  -Check T-spine and L-spine MRI. Results still pending.      Elevated troponin - stable     Normocytic anemia - stable      Diet ADULT DIET; Regular; Low Fat/Low Chol/High Fiber/2 gm Na; 1800 ml   DVT Prophylaxis [] Lovenox, [x]  Heparin, [] SCDs, [] Ambulation,  [] Eliquis, [] Xarelto  [] Coumadin   Code Status Full Code   Disposition From: AL  Expected Disposition: AL vs SNF  Estimated Date of Discharge: 1 DAY  Patient requires continued admission due to ESRD   Surrogate Decision Maker/ POA  On file, son     Personally reviewed Lab Studies and Imaging     Discussed management of the case with Nephrology. I reviewed palliative care's note.     Subjective:     Patient seen and evaluated at bedside. Feeling lousy.       Review of Systems:      Pertinent positives and negatives discussed in HPI    Objective:     Intake/Output Summary (Last 24 hours) at  radiation dose reduction techniques were employed. FINDINGS: Chest: LOWER NECK AND AXILLA: No lymphadenopathy. HEART AND GREAT VESSELS: The heart is enlarged. There appears to be relative hypoenhancement of the left lateral ventricular wall (series 6, image 66) and a region that measures approximately 2.5 cm and is predominantly subendocardial. The trace pericardial effusion. Mild amount of coronary atherosclerosis is present. The main pulmonary artery is dilated suggesting underlying chronic pulmonary artery hypertension. There is a right IJ approach tunneled central venous catheter with tip at the upper right atrium. Cardiac pacing leads are present and in satisfactory position. The thoracic aorta is normal in size. There is no evidence of dissection, aneurysm, or penetrating ulcer of the thoracic aorta or abdominal aorta. There is a shared origin of the right brachiocephalic artery and the left common carotid artery. There is severe tortuosity of the vessels. The great vessels are patent. MEDIASTINUM AND CORIE: No mass or lymphadenopathy. LUNGS AND AIRWAYS: Bilateral lower lung subsegmental atelectasis. No consolidation suspicious for infectious etiology. No suspicious pulmonary nodule. PLEURA: Small bilateral pleural effusions. No pneumothorax. CHEST WALL AND SOFT TISSUES: Left chest cardiac pacer. BONES: No acute or suspicious osseous abnormality. Posterior osseous fusion of the upper thoracic spine with posterior decompression. Abdomen: VASCULAR: ABDOMINAL AORTA: Mild aortic and iliac artery atherosclerosis without aneurysm or stenosis. CELIAC ARTERY: Widely patent. SUPERIOR MESENTERIC ARTERY (SMA): Widely patent. RENAL ARTERIES: Single bilateral renal arteries are widely patent. INFERIOR MESENTERIC ARTERY (NAOMI): Patent. ILIAC VESSELS: Mild calcified atheromatous disease without aneurysm or stenosis. NON-VASCULAR: LIVER: Normal morphology. No suspicious hepatic lesion. GALLBLADDER AND BILIARY TREE: Gallbladder

## 2025-05-04 NOTE — PROGRESS NOTES
NephrologyProgress Note  [phone]  [phone]   [email or web address]        Reason for Consult:  ESKD    HISTORY OF PRESENT ILLNESS:                This is a patient with significant past medical history of ESKD on HD, MWF at [facility], dementia, HTN, CAD/CHF, s/p CABG, chronic back pain, who was recently admitted at [facility]-presented to ED who presented to ED from NH after refusing to go to HD yesterday.  She reports back pain that is chronic.  She is a poor historian and unable to provide history.  She denies any chest pain or SOB.  She does not know her HD unit location.  Labs in ED revealed hyperkalemia with   -Dr. Quiñones from [facility] was consulted-he forwarded consulted to [facility] as patient is followed by us    Subjective:    -pt seen and examined  -PMSHx and meds reviewed  -No family at bedside    No acute events ON  Discharge planning in progress  BP is trending lower      ROS: neg Cp/SOB/confused      PHYSICAL EXAM:    Vitals:    BP (!) 112/56   Pulse 61   Temp 98.1 °F (36.7 °C) (Oral)   Resp 16   Ht 1.524 m (5')   Wt 76.5 kg (168 lb 10.4 oz)   SpO2 97%   BMI 32.94 kg/m²   I/O last 3 completed shifts:  In: 440 [P.O.:440]  Out: -   I/O this shift:  In: 120 [P.O.:120]  Out: -     Physical Exam:  Gen: Elderly female, NAD  HEENT: anicteric, NC/AT  CV: RRR, +S1/S2  Lungs: Good respiratory effort, diminished in bases  Abd: S/NT +BS  Ext: trace edema, no cyanosis  Skin: Warm.  No rashes appreciated.  Neuro: to name only  Joints: No erythema noted over joints.  Access: right TDC    DATA:    CBC:   Lab Results   Component Value Date/Time    WBC 4.8 05/04/2025 05:20 AM    RBC 3.66 05/04/2025 05:20 AM    HGB 11.3 05/04/2025 05:20 AM    HCT 34.2 05/04/2025 05:20 AM    MCV 93.6 05/04/2025 05:20 AM    MCH 31.0 05/04/2025 05:20 AM    MCHC 33.2 05/04/2025 05:20 AM    RDW 15.0 05/04/2025 05:20 AM     05/04/2025 05:20 AM    MPV 10.1 05/04/2025 05:20 AM     BMP:    Lab Results   Component  Value Date/Time     05/03/2025 06:11 AM    K 4.9 05/03/2025 06:11 AM     05/03/2025 06:11 AM    CO2 25 05/03/2025 06:11 AM    BUN 11 05/03/2025 06:11 AM    CREATININE 3.0 05/03/2025 06:11 AM    CALCIUM 9.1 05/03/2025 06:11 AM    LABGLOM 15 05/03/2025 06:11 AM    GLUCOSE 89 05/03/2025 06:11 AM       IMPRESSION/RECOMMENDATIONS:      ESKD:  -HD at [facility]  -missed HD yesterday-complicated by hyperkalemia/hypervolemic  -access: right TDC  -last HD on Friday-post weight 73.5kg, pre-wt 76.5kg  Plan:  -continue HD - next HD in am  -continue to UF as tolerated-goal 3L    HTN:  -trending lower  Plan:  -hold nifedipine  -lower dose of carvedilol   -continue losartan    Hyponatremia:  -hypervolemic  -improving with HD and UF  Plan:  -continue FR 800ml/day  -challenge UF as tolerated  -check BMP pre-HD  -continue lower Na dialysate until Na consistently above 130    Hyperkalemia:  -due to missed HD-resolved  -modulate with HD    Back pain:  -chronic    Anemia of CKD:  -no ADIEL needed    CKD-MBD:  -recheck phos          Thank you for allowing me to participate in the care of this patient.  I will continue to follow along.  Please call with questions.    Kash Barrientos MD

## 2025-05-05 ENCOUNTER — APPOINTMENT (OUTPATIENT)
Age: 89
End: 2025-05-05
Attending: INTERNAL MEDICINE
Payer: COMMERCIAL

## 2025-05-05 PROBLEM — R93.89 ABNORMAL CHEST CT: Status: ACTIVE | Noted: 2025-05-05

## 2025-05-05 PROBLEM — Z99.2 ESRD NEEDING DIALYSIS (HCC): Status: ACTIVE | Noted: 2025-05-05

## 2025-05-05 PROBLEM — N18.6 ESRD NEEDING DIALYSIS (HCC): Status: ACTIVE | Noted: 2025-05-05

## 2025-05-05 LAB
ANION GAP SERPL CALCULATED.3IONS-SCNC: 10 MMOL/L (ref 3–16)
BASOPHILS # BLD: 0 K/UL (ref 0–0.2)
BASOPHILS NFR BLD: 0.4 %
BUN SERPL-MCNC: 29 MG/DL (ref 7–20)
CALCIUM SERPL-MCNC: 9.3 MG/DL (ref 8.3–10.6)
CHLORIDE SERPL-SCNC: 96 MMOL/L (ref 99–110)
CO2 SERPL-SCNC: 25 MMOL/L (ref 21–32)
CREAT SERPL-MCNC: 6.1 MG/DL (ref 0.6–1.2)
DEPRECATED RDW RBC AUTO: 14.7 % (ref 12.4–15.4)
ECHO AO ASC DIAM: 3.2 CM
ECHO AO ASCENDING AORTA INDEX: 1.86 CM/M2
ECHO AO ROOT DIAM: 3 CM
ECHO AO ROOT INDEX: 1.74 CM/M2
ECHO AR MAX VEL PISA: 4.1 M/S
ECHO AV AREA PEAK VELOCITY: 1.5 CM2
ECHO AV AREA/BSA PEAK VELOCITY: 0.9 CM2/M2
ECHO AV PEAK GRADIENT: 14 MMHG
ECHO AV PEAK VELOCITY: 1.8 M/S
ECHO AV REGURGITANT PHT: 636 MS
ECHO AV VELOCITY RATIO: 0.56
ECHO BSA: 1.77 M2
ECHO EST RA PRESSURE: 3 MMHG
ECHO LA AREA 2C: 22.4 CM2
ECHO LA AREA 4C: 30 CM2
ECHO LA MAJOR AXIS: 7.5 CM
ECHO LA MINOR AXIS: 8.1 CM
ECHO LA VOL BP: 73 ML (ref 22–52)
ECHO LA VOL MOD A2C: 50 ML (ref 22–52)
ECHO LA VOL MOD A4C: 98 ML (ref 22–52)
ECHO LA VOL/BSA BIPLANE: 42 ML/M2 (ref 16–34)
ECHO LA VOLUME INDEX MOD A2C: 29 ML/M2 (ref 16–34)
ECHO LA VOLUME INDEX MOD A4C: 57 ML/M2 (ref 16–34)
ECHO LV E' LATERAL VELOCITY: 3.12 CM/S
ECHO LV E' SEPTAL VELOCITY: 3.06 CM/S
ECHO LV EDV A2C: 78 ML
ECHO LV EDV A4C: 42 ML
ECHO LV EDV INDEX A4C: 24 ML/M2
ECHO LV EDV NDEX A2C: 45 ML/M2
ECHO LV EF PHYSICIAN: 68 %
ECHO LV EJECTION FRACTION A2C: 72 %
ECHO LV EJECTION FRACTION A4C: 66 %
ECHO LV EJECTION FRACTION BIPLANE: 69 % (ref 55–100)
ECHO LV ESV A2C: 22 ML
ECHO LV ESV A4C: 14 ML
ECHO LV ESV INDEX A2C: 13 ML/M2
ECHO LV ESV INDEX A4C: 8 ML/M2
ECHO LV FRACTIONAL SHORTENING: 36 % (ref 28–44)
ECHO LV INTERNAL DIMENSION DIASTOLE INDEX: 1.63 CM/M2
ECHO LV INTERNAL DIMENSION DIASTOLIC: 2.8 CM (ref 3.9–5.3)
ECHO LV INTERNAL DIMENSION SYSTOLIC INDEX: 1.05 CM/M2
ECHO LV INTERNAL DIMENSION SYSTOLIC: 1.8 CM
ECHO LV IVSD: 1.4 CM (ref 0.6–0.9)
ECHO LV MASS 2D: 106.2 G (ref 67–162)
ECHO LV MASS INDEX 2D: 61.7 G/M2 (ref 43–95)
ECHO LV POSTERIOR WALL DIASTOLIC: 1.1 CM (ref 0.6–0.9)
ECHO LV RELATIVE WALL THICKNESS RATIO: 0.79
ECHO LVOT AREA: 2.8 CM2
ECHO LVOT DIAM: 1.9 CM
ECHO LVOT MEAN GRADIENT: 2 MMHG
ECHO LVOT PEAK GRADIENT: 4 MMHG
ECHO LVOT PEAK VELOCITY: 1 M/S
ECHO LVOT STROKE VOLUME INDEX: 32.3 ML/M2
ECHO LVOT SV: 55.5 ML
ECHO LVOT VTI: 19.6 CM
ECHO MV A VELOCITY: 1.04 M/S
ECHO MV E DECELERATION TIME (DT): 337 MS
ECHO MV E VELOCITY: 0.94 M/S
ECHO MV E/A RATIO: 0.9
ECHO MV E/E' LATERAL: 30.13
ECHO MV E/E' RATIO (AVERAGED): 30.42
ECHO MV E/E' SEPTAL: 30.72
ECHO PV MAX VELOCITY: 0.5 M/S
ECHO PV PEAK GRADIENT: 1 MMHG
ECHO RA AREA 4C: 16.9 CM2
ECHO RA END SYSTOLIC VOLUME APICAL 4 CHAMBER INDEX BSA: 25 ML/M2
ECHO RA VOLUME: 43 ML
ECHO RIGHT VENTRICULAR SYSTOLIC PRESSURE (RVSP): 36 MMHG
ECHO RV BASAL DIMENSION: 4.1 CM
ECHO RV FREE WALL PEAK S': 9.7 CM/S
ECHO RV MID DIMENSION: 3.3 CM
ECHO RV TAPSE: 1.5 CM (ref 1.7–?)
ECHO TV REGURGITANT MAX VELOCITY: 2.89 M/S
ECHO TV REGURGITANT PEAK GRADIENT: 33 MMHG
EOSINOPHIL # BLD: 0.2 K/UL (ref 0–0.6)
EOSINOPHIL NFR BLD: 3.3 %
GFR SERPLBLD CREATININE-BSD FMLA CKD-EPI: 6 ML/MIN/{1.73_M2}
GLUCOSE BLD-MCNC: 94 MG/DL (ref 70–99)
GLUCOSE SERPL-MCNC: 91 MG/DL (ref 70–99)
HCT VFR BLD AUTO: 32.1 % (ref 36–48)
HGB BLD-MCNC: 11 G/DL (ref 12–16)
LYMPHOCYTES # BLD: 2.2 K/UL (ref 1–5.1)
LYMPHOCYTES NFR BLD: 44.5 %
MCH RBC QN AUTO: 31.3 PG (ref 26–34)
MCHC RBC AUTO-ENTMCNC: 34.2 G/DL (ref 31–36)
MCV RBC AUTO: 91.4 FL (ref 80–100)
MONOCYTES # BLD: 0.5 K/UL (ref 0–1.3)
MONOCYTES NFR BLD: 10.6 %
NEUTROPHILS # BLD: 2.1 K/UL (ref 1.7–7.7)
NEUTROPHILS NFR BLD: 41.2 %
PERFORMED ON: NORMAL
PHOSPHATE SERPL-MCNC: 4 MG/DL (ref 2.5–4.9)
PLATELET # BLD AUTO: 110 K/UL (ref 135–450)
PMV BLD AUTO: 9.8 FL (ref 5–10.5)
POTASSIUM SERPL-SCNC: 4.8 MMOL/L (ref 3.5–5.1)
RBC # BLD AUTO: 3.51 M/UL (ref 4–5.2)
SODIUM SERPL-SCNC: 131 MMOL/L (ref 136–145)
WBC # BLD AUTO: 5 K/UL (ref 4–11)

## 2025-05-05 PROCEDURE — 36415 COLL VENOUS BLD VENIPUNCTURE: CPT

## 2025-05-05 PROCEDURE — 99222 1ST HOSP IP/OBS MODERATE 55: CPT | Performed by: INTERNAL MEDICINE

## 2025-05-05 PROCEDURE — C8929 TTE W OR WO FOL WCON,DOPPLER: HCPCS

## 2025-05-05 PROCEDURE — 6370000000 HC RX 637 (ALT 250 FOR IP): Performed by: INTERNAL MEDICINE

## 2025-05-05 PROCEDURE — 2500000003 HC RX 250 WO HCPCS: Performed by: STUDENT IN AN ORGANIZED HEALTH CARE EDUCATION/TRAINING PROGRAM

## 2025-05-05 PROCEDURE — 93306 TTE W/DOPPLER COMPLETE: CPT | Performed by: INTERNAL MEDICINE

## 2025-05-05 PROCEDURE — 80048 BASIC METABOLIC PNL TOTAL CA: CPT

## 2025-05-05 PROCEDURE — 90935 HEMODIALYSIS ONE EVALUATION: CPT

## 2025-05-05 PROCEDURE — 6360000002 HC RX W HCPCS: Performed by: STUDENT IN AN ORGANIZED HEALTH CARE EDUCATION/TRAINING PROGRAM

## 2025-05-05 PROCEDURE — 6360000004 HC RX CONTRAST MEDICATION: Performed by: INTERNAL MEDICINE

## 2025-05-05 PROCEDURE — 6370000000 HC RX 637 (ALT 250 FOR IP): Performed by: STUDENT IN AN ORGANIZED HEALTH CARE EDUCATION/TRAINING PROGRAM

## 2025-05-05 PROCEDURE — 85025 COMPLETE CBC W/AUTO DIFF WBC: CPT

## 2025-05-05 PROCEDURE — 84100 ASSAY OF PHOSPHORUS: CPT

## 2025-05-05 PROCEDURE — 6360000002 HC RX W HCPCS

## 2025-05-05 PROCEDURE — 2060000000 HC ICU INTERMEDIATE R&B

## 2025-05-05 RX ORDER — HEPARIN SODIUM 1000 [USP'U]/ML
INJECTION, SOLUTION INTRAVENOUS; SUBCUTANEOUS
Status: COMPLETED
Start: 2025-05-05 | End: 2025-05-05

## 2025-05-05 RX ADMIN — HEPARIN SODIUM 5000 UNITS: 5000 INJECTION INTRAVENOUS; SUBCUTANEOUS at 05:16

## 2025-05-05 RX ADMIN — ATORVASTATIN CALCIUM 40 MG: 40 TABLET, FILM COATED ORAL at 19:55

## 2025-05-05 RX ADMIN — SODIUM CHLORIDE, PRESERVATIVE FREE 10 ML: 5 INJECTION INTRAVENOUS at 14:09

## 2025-05-05 RX ADMIN — HEPARIN SODIUM 5000 UNITS: 5000 INJECTION INTRAVENOUS; SUBCUTANEOUS at 20:01

## 2025-05-05 RX ADMIN — CARVEDILOL 6.25 MG: 6.25 TABLET, FILM COATED ORAL at 17:25

## 2025-05-05 RX ADMIN — CARVEDILOL 6.25 MG: 6.25 TABLET, FILM COATED ORAL at 14:08

## 2025-05-05 RX ADMIN — SODIUM CHLORIDE, PRESERVATIVE FREE 10 ML: 5 INJECTION INTRAVENOUS at 20:00

## 2025-05-05 RX ADMIN — OXYCODONE 5 MG: 5 TABLET ORAL at 04:18

## 2025-05-05 RX ADMIN — ACETAMINOPHEN 650 MG: 325 TABLET ORAL at 05:16

## 2025-05-05 RX ADMIN — OXYCODONE 5 MG: 5 TABLET ORAL at 12:16

## 2025-05-05 RX ADMIN — HEPARIN SODIUM 3200 UNITS: 1000 INJECTION INTRAVENOUS; SUBCUTANEOUS at 11:03

## 2025-05-05 RX ADMIN — PRIMIDONE 50 MG: 50 TABLET ORAL at 17:25

## 2025-05-05 RX ADMIN — LOSARTAN POTASSIUM 50 MG: 50 TABLET, FILM COATED ORAL at 14:09

## 2025-05-05 RX ADMIN — SULFUR HEXAFLUORIDE 2 ML: 60.7; .19; .19 INJECTION, POWDER, LYOPHILIZED, FOR SUSPENSION INTRAVENOUS; INTRAVESICAL at 13:49

## 2025-05-05 RX ADMIN — HEPARIN SODIUM 5000 UNITS: 5000 INJECTION INTRAVENOUS; SUBCUTANEOUS at 14:09

## 2025-05-05 ASSESSMENT — PAIN DESCRIPTION - ONSET: ONSET: ON-GOING

## 2025-05-05 ASSESSMENT — PAIN DESCRIPTION - LOCATION
LOCATION: BACK

## 2025-05-05 ASSESSMENT — PAIN SCALES - WONG BAKER
WONGBAKER_NUMERICALRESPONSE: NO HURT

## 2025-05-05 ASSESSMENT — PAIN SCALES - GENERAL
PAINLEVEL_OUTOF10: 8
PAINLEVEL_OUTOF10: 0
PAINLEVEL_OUTOF10: 8
PAINLEVEL_OUTOF10: 3
PAINLEVEL_OUTOF10: 8
PAINLEVEL_OUTOF10: 7

## 2025-05-05 ASSESSMENT — PAIN DESCRIPTION - ORIENTATION
ORIENTATION: RIGHT;LEFT;MID;POSTERIOR
ORIENTATION: LOWER

## 2025-05-05 ASSESSMENT — PAIN DESCRIPTION - DESCRIPTORS
DESCRIPTORS: ACHING
DESCRIPTORS: ACHING;DISCOMFORT
DESCRIPTORS: SHARP;DISCOMFORT
DESCRIPTORS: ACHING;DISCOMFORT

## 2025-05-05 ASSESSMENT — PAIN DESCRIPTION - FREQUENCY: FREQUENCY: INTERMITTENT

## 2025-05-05 ASSESSMENT — PAIN DESCRIPTION - PAIN TYPE: TYPE: CHRONIC PAIN

## 2025-05-05 ASSESSMENT — PAIN - FUNCTIONAL ASSESSMENT
PAIN_FUNCTIONAL_ASSESSMENT: ACTIVITIES ARE NOT PREVENTED
PAIN_FUNCTIONAL_ASSESSMENT: PREVENTS OR INTERFERES SOME ACTIVE ACTIVITIES AND ADLS

## 2025-05-05 NOTE — PROGRESS NOTES
Spoke with Yuliana in MRI, checking to see if screening form and care everywhere is connected so MRI can be complete. She will look into it and call me back.    Electronically signed by France Phan RN on 5/5/2025 at 5:20 PM

## 2025-05-05 NOTE — PROGRESS NOTES
Physician Progress Note      PATIENT:               RENZO TELLES  CSN #:                  [id number]  :                       1935  ADMIT DATE:       2025 9:06 AM  DISCH DATE:  RESPONDING  PROVIDER #:        Enoch Mendez MD          QUERY TEXT:    Elevated cardiac troponin (cTc) levels are documented in the medical record   H&P . Please clarify the cause:    The clinical indicators include:  ESRD on HD missed dialysis per H&P  Trop 105> 99  EKG : Prolonged QT QTc of 510/521.  No peaked T waves.  Options provided:  -- Myocardial injury, non-ischemic  related to ESRD  -- Other - I will add my own diagnosis  -- Disagree - Not applicable / Not valid  -- Disagree - Clinically unable to determine / Unknown  -- Refer to Clinical Documentation Reviewer    PROVIDER RESPONSE TEXT:    Elevated troponin possibly 2/2 ESRD    Query created by: Kelsey Boyer on 2025 12:11 PM      Electronically signed by:  Enoch Mendez MD 2025 10:28 PM

## 2025-05-05 NOTE — PROGRESS NOTES
Several calls to MRI, [facility] was bedside. Attempt to resolve Care Everywhere Form needing signed. Form not in the chart, charge unaware of how to get copy, called registration and asked for someone to come up. Son was unable to stay bedside and had to return to work.

## 2025-05-05 NOTE — PROGRESS NOTES
NephrologyProgress Note  [phone]  [phone]   [email or web address]        Reason for Consult:  ESKD    HISTORY OF PRESENT ILLNESS:                This is a patient with significant past medical history of ESKD on HD, MWF at [facility], dementia, HTN, CAD/CHF, s/p CABG, chronic back pain, who was recently admitted at [facility]-presented to ED who presented to ED from NH after refusing to go to HD yesterday.  She reports back pain that is chronic.  She is a poor historian and unable to provide history.  She denies any chest pain or SOB.  She does not know her HD unit location.  Labs in ED revealed hyperkalemia with   -Dr. Quiñones from [facility] was consulted-he forwarded consulted to [facility] as patient is followed by us    Subjective:    -pt seen and examined  -PMSHx and meds reviewed  -No family at bedside    Seen on HD.  No complaints but confused.    ROS: neg Cp/SOB/confused      PHYSICAL EXAM:    Vitals:    BP (!) 156/57   Pulse 62   Temp 97.6 °F (36.4 °C) (Oral)   Resp 16   Ht 1.524 m (5')   Wt 74.5 kg (164 lb 3.9 oz)   SpO2 97%   BMI 32.08 kg/m²   I/O last 3 completed shifts:  In: 360 [P.O.:360]  Out: -   No intake/output data recorded.    Physical Exam:  Gen: Elderly female, NAD.  On HD.  HEENT: anicteric, NC/AT  CV: RRR, +S1/S2  Lungs: Good respiratory effort, diminished in bases  Abd: S/NT +BS  Ext: trace edema, no cyanosis  Skin: Warm.  No rashes appreciated.  Neuro: to name only  Joints: No erythema noted over joints.  Access: right TDC accessed for HD.    DATA:    CBC:   Lab Results   Component Value Date/Time    WBC 5.0 05/05/2025 05:07 AM    RBC 3.51 05/05/2025 05:07 AM    HGB 11.0 05/05/2025 05:07 AM    HCT 32.1 05/05/2025 05:07 AM    MCV 91.4 05/05/2025 05:07 AM    MCH 31.3 05/05/2025 05:07 AM    MCHC 34.2 05/05/2025 05:07 AM    RDW 14.7 05/05/2025 05:07 AM     05/05/2025 05:07 AM    MPV 9.8 05/05/2025 05:07 AM     BMP:    Lab Results   Component Value Date/Time

## 2025-05-05 NOTE — PROGRESS NOTES
Pt return from dialysis. Vitals stable at this time.    Electronically signed by France Phan RN on 5/5/2025 at 11:35 AM

## 2025-05-05 NOTE — PROGRESS NOTES
Left a message at  for Cici to answer MRI questions.    Electronically signed by France Phan RN on 5/5/2025 at 9:40 AM

## 2025-05-05 NOTE — PROGRESS NOTES
Yuliana in MRI was able to connect with [facility] information. Pacemaker does have conditions and she is attempting to contact [facility] for the needed info. She will keep me posted on status.    Electronically signed by France Phan RN on 5/5/2025 at 5:36 PM

## 2025-05-05 NOTE — PROGRESS NOTES
Treatment time: 3 hours  Net UF: 1800 ml     Pre weight: 73.5 kg  Post weight:73.8 kg  EDW: TBW    Access used: CVC    Access function: well. with  ml/min        Regular outpatient schedule: ZIGGY Dailey      Summary of response to treatment: Patient tolerated treatment well and without any complications. Report given to MINNIE Hough.  Copy of dialysis treatment record placed in chart, to be scanned into EMR.

## 2025-05-05 NOTE — PLAN OF CARE
Problem: Discharge Planning  Goal: Discharge to home or other facility with appropriate resources  5/5/2025 0734 by France Phan, RN  Outcome: Progressing  Flowsheets (Taken 5/5/2025 0734)  Discharge to home or other facility with appropriate resources:   Identify barriers to discharge with patient and caregiver   Arrange for needed discharge resources and transportation as appropriate   Identify discharge learning needs (meds, wound care, etc)   Refer to discharge planning if patient needs post-hospital services based on physician order or complex needs related to functional status, cognitive ability or social support system  Note: Pt progressing to discharge Ludy once MRI is complete.      Problem: Pain  Goal: Verbalizes/displays adequate comfort level or baseline comfort level  5/5/2025 0734 by France Phan, RN  Outcome: Progressing  Flowsheets (Taken 5/5/2025 0205 by Yadi Cooper, RN)  Verbalizes/displays adequate comfort level or baseline comfort level:   Encourage patient to monitor pain and request assistance   Administer analgesics based on type and severity of pain and evaluate response   Assess pain using appropriate pain scale  Note: Pt educated on pain scale and pain control options this shift.      Problem: Safety - Adult  Goal: Free from fall injury  5/5/2025 0734 by France Phan, RN  Outcome: Progressing  Flowsheets  Taken 5/5/2025 0734  Free From Fall Injury: Instruct family/caregiver on patient safety  Taken 5/5/2025 0733  Free From Fall Injury: Instruct family/caregiver on patient safety  Note: Pt is complaint with safety measures, bed alarm, gripper socks and call light.      Problem: Skin/Tissue Integrity  Goal: Skin integrity remains intact  Description: 1.  Monitor for areas of redness and/or skin breakdown2.  Assess vascular access sites hourly3.  Every 4-6 hours minimum:  Change oxygen saturation probe site4.  Every 4-6 hours:  If on nasal continuous positive airway pressure,

## 2025-05-05 NOTE — PROGRESS NOTES
V2.0    [facility] [place]      Name:  Aimee Taylor /Age/Sex: 1935  (89 y.o. female)   MRN & CSN:  [id number] & [id number] Encounter Date/Time: 2025 9:35 AM EDT   Location:  [facility] PCP: No primary care provider on file.     Attending:Lilia Wood MD       Hospital Day: 7    HPI:    Assessment and Recommendations     Hospital course:    Aimee Taylor is a 89 y.o. female with pmh of ESRD, hemodialysis, hypertension coronary artery disease who presents back pain along with missing hemodialysis    Plan:     ESRD on hemodialysis  - Hyperkalemia admission improved  - Dialysis per nephrology    Hypertension  - Episodes of hypotension in the hospital.  Blood pressure medications being adjusted      Elevated troponin-with cardiac abnormality noted on CT  -CT scan shows hypoenhancement 2.5 cm in the subendocardial region of the left lateral ventricular wall which could be acute or chronic infarct or hypoperfusion  - D/W cardiology, patient to get an echo    Severe back pain, acute on chronic back pain  - CT abdomen done at [facility] on 2025 showed an L2 fracture.  Per crisis note they recommended MRI with T SLO brace.  Per family patient never got a T SLO brace on discharge  - Neurosurgery was consulted recommended no further workup  - MRI ordered over here pending.  After MRI is done we will discuss if patient is a candidate for kyphoplasty      H/o  of bradycardia status pacemaker  -Has a [employer] pacer      I spent > 55  minutes in the care of this patient.  Over 50% of that time was in face-to-face counseling regarding disease process, diagnostic testing, preventative measures, and answering patient and family questions.     Diet ADULT DIET; Regular; Low Fat/Low Chol/High Fiber/2 gm Na; 1800 ml   DVT Prophylaxis [] Lovenox, []  Heparin, [] SCDs, [] Ambulation,  [] Eliquis, [] Xarelto  [] Coumadin   Code Status Full Code   Disposition   1 to 2 days     Surrogate Decision  interpreted, includes:    [x] Discussion (any 1)  [x] Discussed the discharge plan in detail with case management including timing/barriers to discharge, need for support services and/or placement decision   [x] Discussed management of the case with: Cardiology for echo today     Subjective:     Chief Complaint: Back pain    Aimee Taylor is a 89 y.o. female who presents with back pain    Still complaining of lower back pain.  Family at bedside      Review of Systems:      Pertinent positives and negatives discussed in HPI    Objective:     Intake/Output Summary (Last 24 hours) at 5/5/2025 0935  Last data filed at 5/4/2025 1147  Gross per 24 hour   Intake 360 ml   Output --   Net 360 ml      Vitals:   Vitals:    05/04/25 2020 05/04/25 2323 05/05/25 0358 05/05/25 0448   BP: 93/77 (!) 152/62 (!) 156/57    Pulse: 65 62     Resp: 16 16 16 16   Temp: 98 °F (36.7 °C) 97.8 °F (36.6 °C) 97.6 °F (36.4 °C)    TempSrc: Oral Oral Oral    SpO2: 97% 97% 97%    Weight:   74.5 kg (164 lb 3.9 oz)    Height:             Physical Exam:      General: NAD  Eyes: EOMI  ENT: neck supple  Cardiovascular: Regular rate.  Respiratory: Clear to auscultation  Gastrointestinal: Soft, non tender  Genitourinary: no suprapubic tenderness  Musculoskeletal: Lower back mild tenderness  Skin: warm, dry  Neuro: Alert.  Poor historian  Psych: Mood appropriate.         Medications:   Medications:    heparin (porcine)        carvedilol  6.25 mg Oral BID WC    losartan  50 mg Oral Daily    sodium chloride flush  5-40 mL IntraVENous 2 times per day    heparin (porcine)  5,000 Units SubCUTAneous 3 times per day    primidone  50 mg Oral Daily    [Held by provider] NIFEdipine  60 mg Oral Daily    atorvastatin  40 mg Oral Nightly      Infusions:    dextrose      sodium chloride       PRN Meds: heparin (porcine), ,   lidocaine, 1 patch, Daily PRN  glucose, 4 tablet, PRN  dextrose bolus, 125 mL, PRN   Or  dextrose bolus, 250 mL, PRN  glucagon (rDNA), 1 mg,

## 2025-05-05 NOTE — PLAN OF CARE
Problem: Discharge Planning  Goal: Discharge to home or other facility with appropriate resources  5/5/2025 0205 by Yadi Cooper RN  Outcome: Progressing  Flowsheets (Taken 5/5/2025 0205)  Discharge to home or other facility with appropriate resources: Identify barriers to discharge with patient and caregiver     Problem: Pain  Goal: Verbalizes/displays adequate comfort level or baseline comfort level  5/5/2025 0205 by Yadi Cooper RN  Outcome: Progressing  Flowsheets (Taken 5/5/2025 0205)  Verbalizes/displays adequate comfort level or baseline comfort level:   Encourage patient to monitor pain and request assistance   Administer analgesics based on type and severity of pain and evaluate response   Assess pain using appropriate pain scale     Problem: Safety - Adult  Goal: Free from fall injury  5/5/2025 0205 by Yadi Cooper RN  Outcome: Progressing  Flowsheets (Taken 5/5/2025 0205)  Free From Fall Injury: Instruct family/caregiver on patient safety     Problem: Skin/Tissue Integrity  Goal: Skin integrity remains intact  Description: 1.  Monitor for areas of redness and/or skin breakdown2.  Assess vascular access sites hourly3.  Every 4-6 hours minimum:  Change oxygen saturation probe site4.  Every 4-6 hours:  If on nasal continuous positive airway pressure, respiratory therapy assess nares and determine need for appliance change or resting period  Outcome: Progressing  Flowsheets (Taken 5/5/2025 0205)  Skin Integrity Remains Intact:   Monitor for areas of redness and/or skin breakdown   Turn and reposition as indicated   Check visual cues for pain     Problem: ABCDS Injury Assessment  Goal: Absence of physical injury  5/5/2025 0205 by Yadi Cooper RN  Outcome: Progressing  Flowsheets (Taken 5/5/2025 0205)  Absence of Physical Injury: Implement safety measures based on patient assessment     Problem: Respiratory - Adult  Goal: Achieves optimal ventilation and oxygenation  Outcome:

## 2025-05-05 NOTE — CONSULTS
[facility] - INITIAL CONSULTATION        CHIEF COMPLAINT  Back pain after missing dialysis      HISTORY OF PRESENTING ILLNESS  Aimee Taylor is an 89-year-old female with history significant for ESRD on HD, hypertension, CAD, CHF status post CABG who presented initially with back pain in the setting of having missed dialysis session.  I am consulted for elevated troponin as well as a CT imaging abnormality of the heart.  She was initially found to have hyperkalemia and hyponatremia.  Hyperkalemia has resolved after hemodialysis.  High sensitivity troponin-I was drawn x 1 and the value was 99.  Patient is not a good historian, however she does deny any chest pain.  Her son is present at the bedside.  Patient is hard of hearing as well.  I communicated with her written language.     Review of the chest CTA in question shows that there is a hypodensity in the LV on the contrast-enhanced imaging that is consistent with a large or prominent papillary muscle.    PAST MEDICAL HISTORY   has a past medical history of Acute kidney failure, Benign paroxysmal vertigo, bilateral, Chronic gout, unspecified, without tophus (tophi), Cognitive communication deficit, End stage renal disease (HCC), Essential (primary) hypertension, Myocardial infarction type 2 (HCC), Postcholecystectomy syndrome, Sensorineural hearing loss (SNHL), bilateral, and Thrombocytopenia.    PAST SURGICAL HISTORY   has no past surgical history on file.     SOCIAL HISTORY   reports that she has never smoked. She has never used smokeless tobacco. Drug use questions deferred to the physician.       FAMILY HISTORY  No family history of premature coronary artery disease, aortic disease, or valve disease.      CARDIAC MEDICATIONS  Scheduled Meds:   carvedilol  6.25 mg Oral BID WC    losartan  50 mg Oral Daily    sodium chloride flush  5-40 mL IntraVENous 2 times per day    heparin (porcine)  5,000 Units SubCUTAneous 3 times per day    primidone  50 mg  Oral Daily    [Held by provider] NIFEdipine  60 mg Oral Daily    atorvastatin  40 mg Oral Nightly     Continuous Infusions:   dextrose      sodium chloride         ALLERGIES  Patient has no known allergies.       REVIEW OF SYSTEMS  14 point ROS done and negative other than HPI      PHYSICAL EXAMINATION    Vitals:    05/05/25 1131   BP: 130/69   Pulse: 63   Resp: 18   Temp: 97.3 °F (36.3 °C)   SpO2: 98%    Weight - Scale: 74.5 kg (164 lb 3.9 oz)       Head - normocephalic, atraumatic  Neck - no JVD  Lungs - CTAB  Heart -RRR, +S1/S2, no murmurs/rubs gallops  Abdomen: soft, non-tender, non-distended  Extremities- no clubbing, no cyanosis, no edema    LABS  .CBC:   Lab Results   Component Value Date/Time    WBC 5.0 05/05/2025 05:07 AM    RBC 3.51 05/05/2025 05:07 AM    HGB 11.0 05/05/2025 05:07 AM    HCT 32.1 05/05/2025 05:07 AM    MCV 91.4 05/05/2025 05:07 AM    MCH 31.3 05/05/2025 05:07 AM    MCHC 34.2 05/05/2025 05:07 AM    RDW 14.7 05/05/2025 05:07 AM     05/05/2025 05:07 AM    MPV 9.8 05/05/2025 05:07 AM     CMP:    Lab Results   Component Value Date/Time     05/05/2025 05:07 AM    K 4.8 05/05/2025 05:07 AM    K 4.9 05/03/2025 06:11 AM    CL 96 05/05/2025 05:07 AM    CO2 25 05/05/2025 05:07 AM    BUN 29 05/05/2025 05:07 AM    CREATININE 6.1 05/05/2025 05:07 AM    LABGLOM 6 05/05/2025 05:07 AM    GLUCOSE 91 05/05/2025 05:07 AM    CALCIUM 9.3 05/05/2025 05:07 AM    BILITOT 0.3 04/29/2025 09:41 AM    ALKPHOS 112 04/29/2025 09:41 AM    AST 37 04/29/2025 09:41 AM    ALT 24 04/29/2025 09:41 AM     Lab Results   Component Value Date    TROPHS 99 (H) 04/29/2025         CARDIAC STUDIES    EKG: Ventricular demand pacing      ASSESSMENT & PLAN:    ESRD on HD  Appreciate care per nephrology.  Dialysis per nephrology    Hyponatremia  Appreciate care per hospitalist and nephrology team    Cardiac abnormality noted on CTA  I reviewed the CTA imaging with contrast, and it does show a hypodensity within the LV that is

## 2025-05-06 LAB
ANION GAP SERPL CALCULATED.3IONS-SCNC: 10 MMOL/L (ref 3–16)
BASOPHILS # BLD: 0 K/UL (ref 0–0.2)
BASOPHILS NFR BLD: 0.1 %
BUN SERPL-MCNC: 20 MG/DL (ref 7–20)
CALCIUM SERPL-MCNC: 9.5 MG/DL (ref 8.3–10.6)
CHLORIDE SERPL-SCNC: 92 MMOL/L (ref 99–110)
CO2 SERPL-SCNC: 25 MMOL/L (ref 21–32)
CREAT SERPL-MCNC: 4.2 MG/DL (ref 0.6–1.2)
DEPRECATED RDW RBC AUTO: 14.9 % (ref 12.4–15.4)
EOSINOPHIL # BLD: 0.2 K/UL (ref 0–0.6)
EOSINOPHIL NFR BLD: 3 %
GFR SERPLBLD CREATININE-BSD FMLA CKD-EPI: 10 ML/MIN/{1.73_M2}
GLUCOSE SERPL-MCNC: 87 MG/DL (ref 70–99)
HCT VFR BLD AUTO: 34.4 % (ref 36–48)
HGB BLD-MCNC: 11.8 G/DL (ref 12–16)
LYMPHOCYTES # BLD: 2.6 K/UL (ref 1–5.1)
LYMPHOCYTES NFR BLD: 42.1 %
MCH RBC QN AUTO: 31.3 PG (ref 26–34)
MCHC RBC AUTO-ENTMCNC: 34.3 G/DL (ref 31–36)
MCV RBC AUTO: 91.2 FL (ref 80–100)
MONOCYTES # BLD: 0.5 K/UL (ref 0–1.3)
MONOCYTES NFR BLD: 8.8 %
NEUTROPHILS # BLD: 2.8 K/UL (ref 1.7–7.7)
NEUTROPHILS NFR BLD: 46 %
PLATELET # BLD AUTO: 93 K/UL (ref 135–450)
PMV BLD AUTO: 10.3 FL (ref 5–10.5)
POTASSIUM SERPL-SCNC: 4.7 MMOL/L (ref 3.5–5.1)
RBC # BLD AUTO: 3.77 M/UL (ref 4–5.2)
SODIUM SERPL-SCNC: 127 MMOL/L (ref 136–145)
WBC # BLD AUTO: 6.1 K/UL (ref 4–11)

## 2025-05-06 PROCEDURE — 36415 COLL VENOUS BLD VENIPUNCTURE: CPT

## 2025-05-06 PROCEDURE — 6370000000 HC RX 637 (ALT 250 FOR IP): Performed by: INTERNAL MEDICINE

## 2025-05-06 PROCEDURE — 85025 COMPLETE CBC W/AUTO DIFF WBC: CPT

## 2025-05-06 PROCEDURE — 2060000000 HC ICU INTERMEDIATE R&B

## 2025-05-06 PROCEDURE — 2500000003 HC RX 250 WO HCPCS: Performed by: STUDENT IN AN ORGANIZED HEALTH CARE EDUCATION/TRAINING PROGRAM

## 2025-05-06 PROCEDURE — 6370000000 HC RX 637 (ALT 250 FOR IP): Performed by: STUDENT IN AN ORGANIZED HEALTH CARE EDUCATION/TRAINING PROGRAM

## 2025-05-06 PROCEDURE — 80048 BASIC METABOLIC PNL TOTAL CA: CPT

## 2025-05-06 PROCEDURE — 6360000002 HC RX W HCPCS: Performed by: STUDENT IN AN ORGANIZED HEALTH CARE EDUCATION/TRAINING PROGRAM

## 2025-05-06 PROCEDURE — 6370000000 HC RX 637 (ALT 250 FOR IP): Performed by: NURSE PRACTITIONER

## 2025-05-06 RX ORDER — OXYCODONE HYDROCHLORIDE 5 MG/1
5 TABLET ORAL EVERY 6 HOURS PRN
Refills: 0 | Status: DISCONTINUED | OUTPATIENT
Start: 2025-05-06 | End: 2025-05-10 | Stop reason: HOSPADM

## 2025-05-06 RX ADMIN — ATORVASTATIN CALCIUM 40 MG: 40 TABLET, FILM COATED ORAL at 19:54

## 2025-05-06 RX ADMIN — SODIUM CHLORIDE, PRESERVATIVE FREE 10 ML: 5 INJECTION INTRAVENOUS at 19:55

## 2025-05-06 RX ADMIN — SODIUM CHLORIDE, PRESERVATIVE FREE 10 ML: 5 INJECTION INTRAVENOUS at 07:45

## 2025-05-06 RX ADMIN — PRIMIDONE 50 MG: 50 TABLET ORAL at 17:07

## 2025-05-06 RX ADMIN — HEPARIN SODIUM 5000 UNITS: 5000 INJECTION INTRAVENOUS; SUBCUTANEOUS at 14:08

## 2025-05-06 RX ADMIN — CARVEDILOL 6.25 MG: 6.25 TABLET, FILM COATED ORAL at 17:07

## 2025-05-06 RX ADMIN — LOSARTAN POTASSIUM 50 MG: 50 TABLET, FILM COATED ORAL at 07:44

## 2025-05-06 RX ADMIN — OXYCODONE 5 MG: 5 TABLET ORAL at 06:10

## 2025-05-06 RX ADMIN — OXYCODONE 5 MG: 5 TABLET ORAL at 20:02

## 2025-05-06 RX ADMIN — HEPARIN SODIUM 5000 UNITS: 5000 INJECTION INTRAVENOUS; SUBCUTANEOUS at 05:30

## 2025-05-06 RX ADMIN — CARVEDILOL 6.25 MG: 6.25 TABLET, FILM COATED ORAL at 07:44

## 2025-05-06 RX ADMIN — HEPARIN SODIUM 5000 UNITS: 5000 INJECTION INTRAVENOUS; SUBCUTANEOUS at 20:00

## 2025-05-06 ASSESSMENT — PAIN SCALES - GENERAL
PAINLEVEL_OUTOF10: 4
PAINLEVEL_OUTOF10: 2
PAINLEVEL_OUTOF10: 2
PAINLEVEL_OUTOF10: 8

## 2025-05-06 ASSESSMENT — PAIN DESCRIPTION - ORIENTATION
ORIENTATION: MID

## 2025-05-06 ASSESSMENT — PAIN DESCRIPTION - LOCATION
LOCATION: BACK

## 2025-05-06 ASSESSMENT — PAIN - FUNCTIONAL ASSESSMENT
PAIN_FUNCTIONAL_ASSESSMENT: PREVENTS OR INTERFERES SOME ACTIVE ACTIVITIES AND ADLS

## 2025-05-06 ASSESSMENT — PAIN DESCRIPTION - ONSET: ONSET: ON-GOING

## 2025-05-06 ASSESSMENT — PAIN DESCRIPTION - DESCRIPTORS
DESCRIPTORS: ACHING;SHARP
DESCRIPTORS: DISCOMFORT;ACHING
DESCRIPTORS: ACHING;SHARP

## 2025-05-06 ASSESSMENT — PAIN DESCRIPTION - PAIN TYPE: TYPE: CHRONIC PAIN

## 2025-05-06 ASSESSMENT — PAIN DESCRIPTION - FREQUENCY: FREQUENCY: CONTINUOUS

## 2025-05-06 NOTE — PLAN OF CARE
Problem: Discharge Planning  Goal: Discharge to home or other facility with appropriate resources  Outcome: Progressing  Flowsheets (Taken 5/6/2025 1804)  Discharge to home or other facility with appropriate resources:   Identify barriers to discharge with patient and caregiver   Arrange for needed discharge resources and transportation as appropriate   Identify discharge learning needs (meds, wound care, etc)   Arrange for interpreters to assist at discharge as needed   Refer to discharge planning if patient needs post-hospital services based on physician order or complex needs related to functional status, cognitive ability or social support system     Problem: Pain  Goal: Verbalizes/displays adequate comfort level or baseline comfort level  Outcome: Progressing  Flowsheets (Taken 5/6/2025 1804)  Verbalizes/displays adequate comfort level or baseline comfort level:   Encourage patient to monitor pain and request assistance   Assess pain using appropriate pain scale   Administer analgesics based on type and severity of pain and evaluate response   Implement non-pharmacological measures as appropriate and evaluate response   Consider cultural and social influences on pain and pain management   Notify Licensed Independent Practitioner if interventions unsuccessful or patient reports new pain     Problem: Safety - Adult  Goal: Free from fall injury  Outcome: Progressing  Flowsheets (Taken 5/6/2025 1804)  Free From Fall Injury: Instruct family/caregiver on patient safety     Problem: Skin/Tissue Integrity  Goal: Skin integrity remains intact  Description: 1.  Monitor for areas of redness and/or skin breakdown2.  Assess vascular access sites hourly3.  Every 4-6 hours minimum:  Change oxygen saturation probe site4.  Every 4-6 hours:  If on nasal continuous positive airway pressure, respiratory therapy assess nares and determine need for appliance change or resting period  Outcome: Progressing  Flowsheets (Taken

## 2025-05-06 NOTE — PROGRESS NOTES
NephrologyProgress Note  [phone]  [phone]   [email or web address]        Reason for Consult:  ESKD    HISTORY OF PRESENT ILLNESS:                This is a patient with significant past medical history of ESKD on HD, MWF at [facility], dementia, HTN, CAD/CHF, s/p CABG, chronic back pain, who was recently admitted at [facility]-presented to ED who presented to ED from NH after refusing to go to HD yesterday.  She reports back pain that is chronic.  She is a poor historian and unable to provide history.  She denies any chest pain or SOB.  She does not know her HD unit location.  Labs in ED revealed hyperkalemia with   -Dr. Quiñones from [place] neph[facility] was consulted-he forwarded consulted to [facility] as patient is followed by us    Subjective:    -pt seen and examined  -PMSHx and meds reviewed  -No family at bedside    Complains of some back pain during HD.  Otherwise no issues with her HD treatment yesterday.    ROS: neg Cp/SOB/confused      PHYSICAL EXAM:    Vitals:    BP (!) 149/61   Pulse 60   Temp 98.1 °F (36.7 °C) (Oral)   Resp 18   Ht 1.524 m (5')   Wt 75.5 kg (166 lb 7.2 oz)   SpO2 98%   BMI 32.51 kg/m²   I/O last 3 completed shifts:  In: 650 [P.O.:650]  Out: 0   I/O this shift:  In: 25 [P.O.:25]  Out: 0     Physical Exam:  Gen: Elderly female, NAD.    HEENT: anicteric, NC/AT  CV: RRR, +S1/S2  Lungs: Good respiratory effort, diminished in bases  Abd: S/NT +BS  Ext: trace edema, no cyanosis  Skin: Warm.  No rashes appreciated.  Neuro: to name only  Joints: No erythema noted over joints.  Access: right TDC c/d/I.    DATA:    CBC:   Lab Results   Component Value Date/Time    WBC 6.1 05/06/2025 03:14 AM    RBC 3.77 05/06/2025 03:14 AM    HGB 11.8 05/06/2025 03:14 AM    HCT 34.4 05/06/2025 03:14 AM    MCV 91.2 05/06/2025 03:14 AM    MCH 31.3 05/06/2025 03:14 AM    MCHC 34.3 05/06/2025 03:14 AM    RDW 14.9 05/06/2025 03:14 AM    PLT 93 05/06/2025 03:14 AM    MPV 10.3 05/06/2025 03:14 AM     BMP:    Lab

## 2025-05-06 NOTE — PLAN OF CARE
Problem: Discharge Planning  Goal: Discharge to home or other facility with appropriate resources  Outcome: Progressing      Problem: Pain  Goal: Verbalizes/displays adequate comfort level or baseline comfort level  Outcome: Progressing     Problem: Safety - Adult  Goal: Free from fall injury  Outcome: Progressing     Problem: Skin/Tissue Integrity  Goal: Skin integrity remains intact  Description: 1.  Monitor for areas of redness and/or skin breakdown2.  Assess vascular access sites hourly3.  Every 4-6 hours minimum:  Change oxygen saturation probe site4.  Every 4-6 hours:  If on nasal continuous positive airway pressure, respiratory therapy assess nares and determine need for appliance change or resting period  Outcome: Progressing      Problem: ABCDS Injury Assessment  Goal: Absence of physical injury  Outcome: Progressing     Problem: Respiratory - Adult  Goal: Achieves optimal ventilation and oxygenation  Outcome: Progressing

## 2025-05-06 NOTE — PROGRESS NOTES
V2.0    [facility] [place]      Name:  Aimee Taylor /Age/Sex: 1935  (89 y.o. female)   MRN & CSN:  [id number] & [id number] Encounter Date/Time: 2025 9:35 AM EDT   Location:  [facility] PCP: No primary care provider on file.     Attending:Lilia Wood MD       Hospital Day: 8    HPI:    Assessment and Recommendations     Hospital course:    Aimee Taylor is a 89 y.o. female with pmh of ESRD, hemodialysis, hypertension coronary artery disease who presents back pain along with missing hemodialysis    Plan:     ESRD on hemodialysis  - Hyperkalemia admission improved  - Dialysis per nephrology    Hypertension-blood pressure controlled on the lower regimen  - Episodes of hypotension in the hospital.  Blood pressure medications being adjusted      Elevated troponin-with cardiac abnormality noted on CT, echo concentric hypertrophy May need outpatient cardiac MRI  -CT scan shows hypoenhancement 2.5 cm in the subendocardial region of the left lateral ventricular wall which could be acute or chronic infarct or hypoperfusion  - I discussed with cardiology concerned that the CT finding is possibly a papillary muscle.  Echo was obtained which showed EF of 65 to 70% with septal thickening and severe concentric hypertrophy.  Mild hypokinesis of the basal inferoseptal area with grade 1 diastolic dysfunction  - May need a cardiac MRI as an outpatient to exclude infiltrative disease    Severe back pain, acute on chronic back pain-MRI still pending  - CT abdomen done at [facility] on 2025 showed an L2 fracture.  Per crisis note they recommended MRI with T SLO brace.  Per family patient never got a T SLO brace on discharge  - Neurosurgery was consulted recommended no further workup  - MRI ordered over here pending.  After MRI is done we will discuss if patient is a candidate for kyphoplasty      H/o  of bradycardia status pacemaker  -Has a [employer] pacer      I spent > 55  minutes in the  \"PROBNP\" in the last 72 hours.  UA:  Lab Results   Component Value Date/Time    NITRU Negative 04/29/2025 11:23 AM    COLORU Yellow 04/29/2025 11:23 AM    PHUR 7.5 04/29/2025 11:23 AM    WBCUA 3-5 04/29/2025 11:23 AM    RBCUA 0-2 04/29/2025 11:23 AM    BACTERIA 1+ 04/29/2025 11:23 AM    CLARITYU Clear 04/29/2025 11:23 AM    LEUKOCYTESUR SMALL 04/29/2025 11:23 AM    UROBILINOGEN 0.2 04/29/2025 11:23 AM    BILIRUBINUR Negative 04/29/2025 11:23 AM    BLOODU TRACE-INTACT 04/29/2025 11:23 AM    GLUCOSEU Negative 04/29/2025 11:23 AM    KETUA Negative 04/29/2025 11:23 AM     Urine Cultures: No results found for: \"LABURIN\"  Blood Cultures: No results found for: \"BC\"  No results found for: \"BLOODCULT2\"  Organism: No results found for: \"ORG\"      Electronically signed by Lilia Wood MD on 5/6/2025 at 8:56 AM  Comment: Please note this report has been produced using speech recognition software and may contain errors related to that system including errors in grammar, punctuation, and spelling, as well as words and phrases that may be inappropriate. If there are any questions or concerns, please feel free to contact the dictating provider for clarification.

## 2025-05-07 ENCOUNTER — APPOINTMENT (OUTPATIENT)
Dept: MRI IMAGING | Age: 89
End: 2025-05-07
Payer: COMMERCIAL

## 2025-05-07 LAB
ANION GAP SERPL CALCULATED.3IONS-SCNC: 9 MMOL/L (ref 3–16)
BASOPHILS # BLD: 0 K/UL (ref 0–0.2)
BASOPHILS NFR BLD: 0.4 %
BUN SERPL-MCNC: 39 MG/DL (ref 7–20)
CALCIUM SERPL-MCNC: 9.4 MG/DL (ref 8.3–10.6)
CHLORIDE SERPL-SCNC: 93 MMOL/L (ref 99–110)
CO2 SERPL-SCNC: 26 MMOL/L (ref 21–32)
CREAT SERPL-MCNC: 6.2 MG/DL (ref 0.6–1.2)
DEPRECATED RDW RBC AUTO: 14.6 % (ref 12.4–15.4)
EOSINOPHIL # BLD: 0.2 K/UL (ref 0–0.6)
EOSINOPHIL NFR BLD: 2.8 %
GFR SERPLBLD CREATININE-BSD FMLA CKD-EPI: 6 ML/MIN/{1.73_M2}
GLUCOSE SERPL-MCNC: 89 MG/DL (ref 70–99)
HCT VFR BLD AUTO: 32.3 % (ref 36–48)
HGB BLD-MCNC: 11 G/DL (ref 12–16)
LYMPHOCYTES # BLD: 2.6 K/UL (ref 1–5.1)
LYMPHOCYTES NFR BLD: 39.8 %
MCH RBC QN AUTO: 31.1 PG (ref 26–34)
MCHC RBC AUTO-ENTMCNC: 34.1 G/DL (ref 31–36)
MCV RBC AUTO: 91.2 FL (ref 80–100)
MONOCYTES # BLD: 0.6 K/UL (ref 0–1.3)
MONOCYTES NFR BLD: 9.5 %
NEUTROPHILS # BLD: 3.1 K/UL (ref 1.7–7.7)
NEUTROPHILS NFR BLD: 47.5 %
PHOSPHATE SERPL-MCNC: 4.6 MG/DL (ref 2.5–4.9)
PLATELET # BLD AUTO: 102 K/UL (ref 135–450)
PMV BLD AUTO: 9.4 FL (ref 5–10.5)
POTASSIUM SERPL-SCNC: 5.3 MMOL/L (ref 3.5–5.1)
RBC # BLD AUTO: 3.54 M/UL (ref 4–5.2)
SODIUM SERPL-SCNC: 128 MMOL/L (ref 136–145)
WBC # BLD AUTO: 6.4 K/UL (ref 4–11)

## 2025-05-07 PROCEDURE — 72158 MRI LUMBAR SPINE W/O & W/DYE: CPT

## 2025-05-07 PROCEDURE — 2500000003 HC RX 250 WO HCPCS: Performed by: STUDENT IN AN ORGANIZED HEALTH CARE EDUCATION/TRAINING PROGRAM

## 2025-05-07 PROCEDURE — 6360000002 HC RX W HCPCS

## 2025-05-07 PROCEDURE — A9576 INJ PROHANCE MULTIPACK: HCPCS | Performed by: STUDENT IN AN ORGANIZED HEALTH CARE EDUCATION/TRAINING PROGRAM

## 2025-05-07 PROCEDURE — 6360000004 HC RX CONTRAST MEDICATION: Performed by: STUDENT IN AN ORGANIZED HEALTH CARE EDUCATION/TRAINING PROGRAM

## 2025-05-07 PROCEDURE — 90935 HEMODIALYSIS ONE EVALUATION: CPT

## 2025-05-07 PROCEDURE — 80048 BASIC METABOLIC PNL TOTAL CA: CPT

## 2025-05-07 PROCEDURE — 2060000000 HC ICU INTERMEDIATE R&B

## 2025-05-07 PROCEDURE — 6370000000 HC RX 637 (ALT 250 FOR IP): Performed by: STUDENT IN AN ORGANIZED HEALTH CARE EDUCATION/TRAINING PROGRAM

## 2025-05-07 PROCEDURE — 6370000000 HC RX 637 (ALT 250 FOR IP): Performed by: INTERNAL MEDICINE

## 2025-05-07 PROCEDURE — 6360000002 HC RX W HCPCS: Performed by: STUDENT IN AN ORGANIZED HEALTH CARE EDUCATION/TRAINING PROGRAM

## 2025-05-07 PROCEDURE — 85025 COMPLETE CBC W/AUTO DIFF WBC: CPT

## 2025-05-07 PROCEDURE — 84100 ASSAY OF PHOSPHORUS: CPT

## 2025-05-07 PROCEDURE — 76018 MR SAFETY IMPLANT ELEC PREPJ: CPT

## 2025-05-07 PROCEDURE — 6370000000 HC RX 637 (ALT 250 FOR IP)

## 2025-05-07 PROCEDURE — 36415 COLL VENOUS BLD VENIPUNCTURE: CPT

## 2025-05-07 RX ORDER — HEPARIN SODIUM 1000 [USP'U]/ML
3200 INJECTION, SOLUTION INTRAVENOUS; SUBCUTANEOUS PRN
Status: DISCONTINUED | OUTPATIENT
Start: 2025-05-07 | End: 2025-05-10 | Stop reason: HOSPADM

## 2025-05-07 RX ORDER — MIDODRINE HYDROCHLORIDE 5 MG/1
5 TABLET ORAL ONCE
Status: COMPLETED | OUTPATIENT
Start: 2025-05-07 | End: 2025-05-07

## 2025-05-07 RX ORDER — HEPARIN SODIUM 1000 [USP'U]/ML
INJECTION, SOLUTION INTRAVENOUS; SUBCUTANEOUS
Status: COMPLETED
Start: 2025-05-07 | End: 2025-05-07

## 2025-05-07 RX ORDER — MIDODRINE HYDROCHLORIDE 5 MG/1
10 TABLET ORAL ONCE
Status: COMPLETED | OUTPATIENT
Start: 2025-05-07 | End: 2025-05-08

## 2025-05-07 RX ORDER — LIDOCAINE 4 G/G
1 PATCH TOPICAL 2 TIMES DAILY PRN
Status: DISCONTINUED | OUTPATIENT
Start: 2025-05-07 | End: 2025-05-10 | Stop reason: HOSPADM

## 2025-05-07 RX ADMIN — CARVEDILOL 6.25 MG: 6.25 TABLET, FILM COATED ORAL at 11:13

## 2025-05-07 RX ADMIN — HEPARIN SODIUM 3200 UNITS: 1000 INJECTION INTRAVENOUS; SUBCUTANEOUS at 10:52

## 2025-05-07 RX ADMIN — ACETAMINOPHEN 650 MG: 325 TABLET ORAL at 22:11

## 2025-05-07 RX ADMIN — CARVEDILOL 6.25 MG: 6.25 TABLET, FILM COATED ORAL at 17:24

## 2025-05-07 RX ADMIN — SODIUM CHLORIDE, PRESERVATIVE FREE 10 ML: 5 INJECTION INTRAVENOUS at 20:44

## 2025-05-07 RX ADMIN — HEPARIN SODIUM 5000 UNITS: 5000 INJECTION INTRAVENOUS; SUBCUTANEOUS at 05:46

## 2025-05-07 RX ADMIN — LOSARTAN POTASSIUM 50 MG: 50 TABLET, FILM COATED ORAL at 11:12

## 2025-05-07 RX ADMIN — SODIUM CHLORIDE, PRESERVATIVE FREE 10 ML: 5 INJECTION INTRAVENOUS at 11:14

## 2025-05-07 RX ADMIN — ATORVASTATIN CALCIUM 40 MG: 40 TABLET, FILM COATED ORAL at 20:43

## 2025-05-07 RX ADMIN — OXYCODONE 5 MG: 5 TABLET ORAL at 05:46

## 2025-05-07 RX ADMIN — MIDODRINE HYDROCHLORIDE 5 MG: 5 TABLET ORAL at 20:43

## 2025-05-07 RX ADMIN — HEPARIN SODIUM 3200 UNITS: 1000 INJECTION, SOLUTION INTRAVENOUS; SUBCUTANEOUS at 10:52

## 2025-05-07 RX ADMIN — HEPARIN SODIUM 5000 UNITS: 5000 INJECTION INTRAVENOUS; SUBCUTANEOUS at 22:12

## 2025-05-07 RX ADMIN — HEPARIN SODIUM 5000 UNITS: 5000 INJECTION INTRAVENOUS; SUBCUTANEOUS at 14:13

## 2025-05-07 RX ADMIN — GADOTERIDOL 14 ML: 279.3 INJECTION, SOLUTION INTRAVENOUS at 18:53

## 2025-05-07 RX ADMIN — PRIMIDONE 50 MG: 50 TABLET ORAL at 17:24

## 2025-05-07 RX ADMIN — OXYCODONE 5 MG: 5 TABLET ORAL at 11:12

## 2025-05-07 RX ADMIN — ACETAMINOPHEN 650 MG: 325 TABLET ORAL at 15:48

## 2025-05-07 ASSESSMENT — PAIN DESCRIPTION - FREQUENCY
FREQUENCY: INTERMITTENT

## 2025-05-07 ASSESSMENT — PAIN DESCRIPTION - PAIN TYPE
TYPE: CHRONIC PAIN

## 2025-05-07 ASSESSMENT — PAIN SCALES - GENERAL
PAINLEVEL_OUTOF10: 8
PAINLEVEL_OUTOF10: 8
PAINLEVEL_OUTOF10: 4
PAINLEVEL_OUTOF10: 0
PAINLEVEL_OUTOF10: 8

## 2025-05-07 ASSESSMENT — PAIN DESCRIPTION - DESCRIPTORS
DESCRIPTORS: DISCOMFORT
DESCRIPTORS: ACHING
DESCRIPTORS: SHARP
DESCRIPTORS: ACHING

## 2025-05-07 ASSESSMENT — PAIN DESCRIPTION - LOCATION
LOCATION: BACK

## 2025-05-07 ASSESSMENT — PAIN DESCRIPTION - ORIENTATION
ORIENTATION: MID
ORIENTATION: MID
ORIENTATION: LOWER
ORIENTATION: MID;LOWER

## 2025-05-07 ASSESSMENT — PAIN DESCRIPTION - ONSET
ONSET: ON-GOING

## 2025-05-07 NOTE — PROGRESS NOTES
V2.0    [facility] [place]      Name:  Aimee Taylor /Age/Sex: 1935  (89 y.o. female)   MRN & CSN:  [id number] & [id number] Encounter Date/Time: 2025 9:35 AM EDT   Location:  [facility] PCP: No primary care provider on file.     Attending:Lilia Wood MD       Hospital Day: 9    HPI:    Assessment and Recommendations     Hospital course:    Aimee Taylor is a 89 y.o. female with pmh of ESRD, hemodialysis, hypertension coronary artery disease who presents back pain along with missing hemodialysis    Plan:     ESRD on hemodialysis  - Hyperkalemia admission improved  - Dialysis per nephrology    Hypertension-blood pressure controlled on the lower regimen  - Episodes of hypotension in the hospital.  Blood pressure medications being adjusted      Elevated troponin-with cardiac abnormality noted on CT, echo concentric hypertrophy May need outpatient cardiac MRI  -CT scan shows hypoenhancement 2.5 cm in the subendocardial region of the left lateral ventricular wall which could be acute or chronic infarct or hypoperfusion  - I discussed with cardiology concerned that the CT finding is possibly a papillary muscle.  Echo was obtained which showed EF of 65 to 70% with septal thickening and severe concentric hypertrophy.  Mild hypokinesis of the basal inferoseptal area with grade 1 diastolic dysfunction  - May need a cardiac MRI as an outpatient to exclude infiltrative disease    Severe back pain, acute on chronic back pain-MRI still pending, awaiting patient's cardiologist paperwork  - CT abdomen done at [facility] on 2025 showed an L2 fracture.  Per crisis note they recommended MRI with T SLO brace.  Per family patient never got a T SLO brace on discharge  - Neurosurgery was consulted recommended no further workup  - MRI ordered over here pending.  After MRI is done we will discuss if patient is a candidate for kyphoplasty      H/o  of bradycardia status pacemaker  -Has a [id number]

## 2025-05-07 NOTE — PROGRESS NOTES
The MRI Department spent a total of 15 minutes of research time to determine if this patient's MRI can be completed. The MRI department spent a total of 15 minutes of research time to determine if the patient can be scanned safely for their MRI exam. It was determined that this patient can be completed at this time.

## 2025-05-07 NOTE — PROGRESS NOTES
Treatment time: 3 hrs    Net UF: 2500 ml     Pre weight: 76.2 kg  Post weight: 73.8 kg     Access used: Rtdc  Access function:  tolerated well,  BFR 350ml/min     Medications or blood products given: heparin dwells     Regular outpatient schedule: MWF     Summary of response to treatment: Pt tolerated well. Pt remained stable throughout entire treatment and upon exiting the hemodialysis suite.      Copy of dialysis treatment record placed in chart, to be scanned into EMR.

## 2025-05-07 NOTE — PROGRESS NOTES
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  She reports back pain that is chronic.  She is a poor historian and unable to provide history.  She denies any chest pain or SOB.  She does not know her HD unit location.  Labs in ED revealed hyperkalemia with   -Dr. Quiñones from [place] nephrology was consulted-he forwarded consulted to [facility] as patient is followed by us    Subjective:    -pt seen and examined  -PMSHx and meds reviewed  -No family at bedside    Set for HD today.  No complaints.      ROS: neg Cp/SOB/confused      PHYSICAL EXAM:    Vitals:    BP (!) 118/59   Pulse 63   Temp 98 °F (36.7 °C) (Oral)   Resp 16   Ht 1.524 m (5')   Wt 76.2 kg (167 lb 15.9 oz)   SpO2 100%   BMI 32.81 kg/m²   I/O last 3 completed shifts:  In: 365 [P.O.:365]  Out: 0   No intake/output data recorded.    Physical Exam:  Gen: Elderly female, NAD.    HEENT: Very hard of hearing.  CV: RRR, +S1/S2  Lungs: Good respiratory effort, diminished in bases  Abd: S/NT +BS  Ext: trace edema, no cyanosis  Skin: Warm.  No rashes appreciated.  Neuro: to name only  Joints: No erythema noted over joints.  Access: right TDC c/d/I.    DATA:    CBC:   Lab Results   Component Value Date/Time    WBC 6.4 05/07/2025 03:58 AM    RBC 3.54 05/07/2025 03:58 AM    HGB 11.0 05/07/2025 03:58 AM    HCT 32.3 05/07/2025 03:58 AM    MCV 91.2 05/07/2025 03:58 AM    MCH 31.1 05/07/2025 03:58 AM    MCHC 34.1 05/07/2025 03:58 AM    RDW 14.6 05/07/2025 03:58 AM     05/07/2025 03:58 AM    MPV 9.4 05/07/2025 03:58 AM     BMP:    Lab Results   Component Value Date/Time     05/07/2025 03:58

## 2025-05-07 NOTE — PLAN OF CARE
Problem: Discharge Planning  Goal: Discharge to home or other facility with appropriate resources  5/7/2025 0131 by Brayden Pimentel RN  Outcome: Progressing     Problem: Pain  Goal: Verbalizes/displays adequate comfort level or baseline comfort level  5/7/2025 0131 by Brayden Pimentel RN  Outcome: Progressing     Problem: Safety - Adult  Goal: Free from fall injury  5/7/2025 0131 by Brayden Pimentel RN  Outcome: Progressing     Problem: Skin/Tissue Integrity  Goal: Skin integrity remains intact  Description: 1.  Monitor for areas of redness and/or skin breakdown2.  Assess vascular access sites hourly3.  Every 4-6 hours minimum:  Change oxygen saturation probe site4.  Every 4-6 hours:  If on nasal continuous positive airway pressure, respiratory therapy assess nares and determine need for appliance change or resting period  5/7/2025 0131 by Brayden Pimentel RN  Outcome: Progressing     Problem: ABCDS Injury Assessment  Goal: Absence of physical injury  Outcome: Progressing     Problem: Respiratory - Adult  Goal: Achieves optimal ventilation and oxygenation  5/7/2025 0131 by Brayden Pimentel RN  Outcome: Progressing     Problem: Cardiovascular - Adult  Goal: Maintains optimal cardiac output and hemodynamic stability  5/7/2025 0131 by Brayden Pimentel RN  Outcome: Progressing     Problem: Cardiovascular - Adult  Goal: Absence of cardiac dysrhythmias or at baseline  5/7/2025 0131 by Brayden Pimentel RN  Outcome: Progressing     Problem: Metabolic/Fluid and Electrolytes - Adult  Goal: Electrolytes maintained within normal limits  5/7/2025 0131 by Brayden Pimentel RN  Outcome: Progressing     Problem: Metabolic/Fluid and Electrolytes - Adult  Goal: Hemodynamic stability and optimal renal function maintained  5/7/2025 0131 by Brayden Pimentel RN  Outcome: Progressing

## 2025-05-07 NOTE — PLAN OF CARE
Problem: Discharge Planning  Goal: Discharge to home or other facility with appropriate resources  Outcome: Progressing  Flowsheets (Taken 5/7/2025 1712)  Discharge to home or other facility with appropriate resources:   Identify barriers to discharge with patient and caregiver   Arrange for needed discharge resources and transportation as appropriate   Identify discharge learning needs (meds, wound care, etc)   Arrange for interpreters to assist at discharge as needed   Refer to discharge planning if patient needs post-hospital services based on physician order or complex needs related to functional status, cognitive ability or social support system     Problem: Pain  Goal: Verbalizes/displays adequate comfort level or baseline comfort level  Outcome: Progressing  Flowsheets (Taken 5/7/2025 1712)  Verbalizes/displays adequate comfort level or baseline comfort level:   Encourage patient to monitor pain and request assistance   Assess pain using appropriate pain scale   Administer analgesics based on type and severity of pain and evaluate response   Implement non-pharmacological measures as appropriate and evaluate response   Consider cultural and social influences on pain and pain management   Notify Licensed Independent Practitioner if interventions unsuccessful or patient reports new pain     Problem: Safety - Adult  Goal: Free from fall injury  Outcome: Progressing  Flowsheets (Taken 5/7/2025 1712)  Free From Fall Injury: Instruct family/caregiver on patient safety     Problem: Skin/Tissue Integrity  Goal: Skin integrity remains intact  Description: 1.  Monitor for areas of redness and/or skin breakdown2.  Assess vascular access sites hourly3.  Every 4-6 hours minimum:  Change oxygen saturation probe site4.  Every 4-6 hours:  If on nasal continuous positive airway pressure, respiratory therapy assess nares and determine need for appliance change or resting period  Outcome: Progressing  Flowsheets (Taken

## 2025-05-08 PROCEDURE — 6370000000 HC RX 637 (ALT 250 FOR IP): Performed by: STUDENT IN AN ORGANIZED HEALTH CARE EDUCATION/TRAINING PROGRAM

## 2025-05-08 PROCEDURE — 6370000000 HC RX 637 (ALT 250 FOR IP): Performed by: INTERNAL MEDICINE

## 2025-05-08 PROCEDURE — 6370000000 HC RX 637 (ALT 250 FOR IP)

## 2025-05-08 PROCEDURE — 2060000000 HC ICU INTERMEDIATE R&B

## 2025-05-08 PROCEDURE — 6360000002 HC RX W HCPCS: Performed by: STUDENT IN AN ORGANIZED HEALTH CARE EDUCATION/TRAINING PROGRAM

## 2025-05-08 PROCEDURE — 2500000003 HC RX 250 WO HCPCS: Performed by: STUDENT IN AN ORGANIZED HEALTH CARE EDUCATION/TRAINING PROGRAM

## 2025-05-08 RX ADMIN — HEPARIN SODIUM 5000 UNITS: 5000 INJECTION INTRAVENOUS; SUBCUTANEOUS at 14:45

## 2025-05-08 RX ADMIN — HEPARIN SODIUM 5000 UNITS: 5000 INJECTION INTRAVENOUS; SUBCUTANEOUS at 06:41

## 2025-05-08 RX ADMIN — HEPARIN SODIUM 5000 UNITS: 5000 INJECTION INTRAVENOUS; SUBCUTANEOUS at 21:41

## 2025-05-08 RX ADMIN — SODIUM CHLORIDE, PRESERVATIVE FREE 10 ML: 5 INJECTION INTRAVENOUS at 21:41

## 2025-05-08 RX ADMIN — PRIMIDONE 50 MG: 50 TABLET ORAL at 16:35

## 2025-05-08 RX ADMIN — ACETAMINOPHEN 650 MG: 325 TABLET ORAL at 08:04

## 2025-05-08 RX ADMIN — MIDODRINE HYDROCHLORIDE 10 MG: 5 TABLET ORAL at 03:57

## 2025-05-08 RX ADMIN — OXYCODONE 5 MG: 5 TABLET ORAL at 04:58

## 2025-05-08 RX ADMIN — SODIUM CHLORIDE, PRESERVATIVE FREE 10 ML: 5 INJECTION INTRAVENOUS at 07:55

## 2025-05-08 RX ADMIN — ATORVASTATIN CALCIUM 40 MG: 40 TABLET, FILM COATED ORAL at 21:41

## 2025-05-08 ASSESSMENT — PAIN DESCRIPTION - FREQUENCY
FREQUENCY: INTERMITTENT
FREQUENCY: CONTINUOUS
FREQUENCY: INTERMITTENT
FREQUENCY: CONTINUOUS

## 2025-05-08 ASSESSMENT — PAIN DESCRIPTION - DESCRIPTORS
DESCRIPTORS: ACHING
DESCRIPTORS: ACHING;DISCOMFORT
DESCRIPTORS: ACHING;DISCOMFORT
DESCRIPTORS: ACHING

## 2025-05-08 ASSESSMENT — PAIN DESCRIPTION - LOCATION
LOCATION: BACK

## 2025-05-08 ASSESSMENT — PAIN DESCRIPTION - PAIN TYPE
TYPE: CHRONIC PAIN

## 2025-05-08 ASSESSMENT — PAIN SCALES - WONG BAKER
WONGBAKER_NUMERICALRESPONSE: NO HURT

## 2025-05-08 ASSESSMENT — PAIN - FUNCTIONAL ASSESSMENT
PAIN_FUNCTIONAL_ASSESSMENT: ACTIVITIES ARE NOT PREVENTED
PAIN_FUNCTIONAL_ASSESSMENT: PREVENTS OR INTERFERES SOME ACTIVE ACTIVITIES AND ADLS
PAIN_FUNCTIONAL_ASSESSMENT: ACTIVITIES ARE NOT PREVENTED
PAIN_FUNCTIONAL_ASSESSMENT: ACTIVITIES ARE NOT PREVENTED

## 2025-05-08 ASSESSMENT — PAIN DESCRIPTION - ORIENTATION
ORIENTATION: LOWER
ORIENTATION: MID;LOWER;POSTERIOR
ORIENTATION: LOWER
ORIENTATION: MID;LOWER;POSTERIOR

## 2025-05-08 ASSESSMENT — PAIN SCALES - GENERAL
PAINLEVEL_OUTOF10: 7
PAINLEVEL_OUTOF10: 7
PAINLEVEL_OUTOF10: 5
PAINLEVEL_OUTOF10: 2
PAINLEVEL_OUTOF10: 7
PAINLEVEL_OUTOF10: 8
PAINLEVEL_OUTOF10: 8

## 2025-05-08 ASSESSMENT — PAIN DESCRIPTION - ONSET
ONSET: ON-GOING

## 2025-05-08 NOTE — PROGRESS NOTES
Patient's BP 71/49 right arm and 76/55 left arm.  Manual BP 74/50 left arm. Had dialysis today and Coreg at 1724.  Reports a little dizziness.  [employer] message sent to GERMÁN Macdonald to make aware.  Patient lying on back, BP up to 96/51.  Stated to monitor.  BP continually low with an automatic reading 76/44, 61/42, 78/48.  Manually 86/50, MAP 62.  GERMÁN Ervin made aware via [employer].  New order given for Midodrine x1.

## 2025-05-08 NOTE — PROGRESS NOTES
Notified by RN around 1940 of persistent hypotension after dialysis with SBP 70s. Scheduled Coreg dose was given at 1724. Was given Midodrine 5 mg X 1 at 2043. Slow improvement in SBP was noted, with consistent SBP > 100 mm Hg achieved after midnight. Was given an additional Midodrine 10 mg X 1 dose in order to be able to take pain medication, as it been held due to hypotension.    Could consider adjusting BP meds vs adjusting net UF.    Erika Shelley PA-C  4:06 AM

## 2025-05-08 NOTE — PLAN OF CARE
Problem: Discharge Planning  Goal: Discharge to home or other facility with appropriate resources  Outcome: Progressing  Flowsheets (Taken 5/8/2025 1626)  Discharge to home or other facility with appropriate resources:   Arrange for needed discharge resources and transportation as appropriate   Identify barriers to discharge with patient and caregiver   Identify discharge learning needs (meds, wound care, etc)   Arrange for interpreters to assist at discharge as needed   Refer to discharge planning if patient needs post-hospital services based on physician order or complex needs related to functional status, cognitive ability or social support system     Problem: Pain  Goal: Verbalizes/displays adequate comfort level or baseline comfort level  5/8/2025 1626 by Aria Ludwig, RN  Outcome: Progressing  Flowsheets (Taken 5/8/2025 1626)  Verbalizes/displays adequate comfort level or baseline comfort level:   Encourage patient to monitor pain and request assistance   Assess pain using appropriate pain scale   Administer analgesics based on type and severity of pain and evaluate response   Implement non-pharmacological measures as appropriate and evaluate response   Notify Licensed Independent Practitioner if interventions unsuccessful or patient reports new pain   Consider cultural and social influences on pain and pain management     Problem: Safety - Adult  Goal: Free from fall injury  5/8/2025 1626 by Aria Ludwig, RN  Outcome: Progressing  Flowsheets (Taken 5/8/2025 1626)  Free From Fall Injury:   Based on caregiver fall risk screen, instruct family/caregiver to ask for assistance with transferring infant if caregiver noted to have fall risk factors   Instruct family/caregiver on patient safety     Problem: Skin/Tissue Integrity  Goal: Skin integrity remains intact  Description: 1.  Monitor for areas of redness and/or skin breakdown2.  Assess vascular access sites hourly3.  Every 4-6 hours minimum:  Change oxygen  saturation probe site4.  Every 4-6 hours:  If on nasal continuous positive airway pressure, respiratory therapy assess nares and determine need for appliance change or resting period  Outcome: Progressing  Flowsheets (Taken 5/8/2025 1626)  Skin Integrity Remains Intact:   Monitor for areas of redness and/or skin breakdown   Every 4-6 hours minimum:  Change oxygen saturation probe site   Every 4-6 hours:  If on nasal continuous positive airway pressure, assess nares and determine need for appliance change or resting period   Turn and reposition as indicated   Pressure redistribution bed/mattress (bed type)   Assess need for specialty bed   Positioning devices   Check visual cues for pain   Monitor skin under medical devices   Assess vascular access sites hourly     Problem: ABCDS Injury Assessment  Goal: Absence of physical injury  Outcome: Progressing  Flowsheets (Taken 5/8/2025 1626)  Absence of Physical Injury: Implement safety measures based on patient assessment     Problem: Cardiovascular - Adult  Goal: Maintains optimal cardiac output and hemodynamic stability  Outcome: Progressing  Flowsheets (Taken 5/8/2025 1626)  Maintains optimal cardiac output and hemodynamic stability:   Monitor blood pressure and heart rate   Monitor urine output and notify Licensed Independent Practitioner for values outside of normal range   Assess for signs of decreased cardiac output   Administer fluid and/or volume expanders as ordered   Administer vasoactive medications as ordered

## 2025-05-08 NOTE — PROGRESS NOTES
Comprehensive Nutrition Assessment    RECOMMENDATIONS:  PO Diet: Continue Regular; cardiac; low K, 1800 mL FR diet  Nutrition Supplement: Add expedite jello daily (wound healing)  Nutrition Education: Education/Counseling not appropriate     NUTRITION ASSESSMENT:   Nutritional summary & status: LOS: Pt admitted after missing HD session resulting in hyperkalemia and hypervolemia. On a regular; cardiac diet w/ K restriction, K remains elevated. Fluid restriction in place for hyponatremia. Intakes at >50% throughout admission. She is unable to provide any history d/t her dementia. Weight loss in chart r/t fluid loss from dialysis, Nephrologist considering increasing goal weight d/t hypotension following HD. Stage I pressure injury to buttocks, will add wound healing supplement daily to prevent escalation.   Admission // PMH: Hyperkalemia//ESRD on HD, dementia, HTN, CAD    MALNUTRITION ASSESSMENT  Context of Malnutrition: Acute Illness   Malnutrition Status: No malnutrition  Findings of the 6 clinical characteristics of malnutrition (Minimum of 2 out of 6 clinical characteristics is required to make the diagnosis of moderate or severe Protein Calorie Malnutrition based on AND/ASPEN Guidelines):  Energy Intake:  No decrease in energy intake  Weight Loss:  Unable to assess (Fluid losses from HD)     Body Fat Loss:  No body fat loss     Muscle Mass Loss:  No muscle mass loss    Fluid Accumulation:  Mild     Strength:  Not Performed    NUTRITION DIAGNOSIS   Increased nutrient needs related to increase demand for energy/nutrients as evidenced by wounds    Nutrition Monitoring and Evaluation:   Food/Nutrient Intake Outcomes:  Food and Nutrient Intake, Supplement Intake  Physical Signs/Symptoms Outcomes:  Biochemical Data, Nutrition Focused Physical Findings, Skin, Weight     OBJECTIVE DATA: Significant to nutrition assessment  Nutrition Related Findings: Na 128. K 5.3. Cr 6.2. +BM 5/7.  Wounds: Pressure Injury, Stage  I  Nutrition Goals: PO intake 75% or greater, within 2 days     CURRENT NUTRITION THERAPIES  ADULT DIET; Regular; Low Fat/Low Chol/High Fiber/2 gm Na; Low Potassium (Less than 3000 mg/day); 1800 ml  PO Intake: 51-75%, %, 26-50%   PO Supplement Intake:None Ordered  Additional Sources of Calories/IVF:N/A     COMPARATIVE STANDARDS  Energy (kcal):  1335-4153 (15-18 kcal/kg CBW)     Protein (g):  88-96 (1.2-1.3 g/kg CBW)       Fluid (ml/day):  1800 mL    ANTHROPOMETRICS  Current Height: 152.4 cm (5')  Current Weight - Scale: 73.5 kg (162 lb 0.6 oz)    Admission weight: 83 kg (183 lb)    The patient will be monitored per nutrition standards of care. Consult dietitian if additional nutrition interventions are needed prior to RD reassessment.     Tamar Winter RD, LD  [place]:  [phone]

## 2025-05-08 NOTE — PLAN OF CARE
Problem: Pain  Goal: Verbalizes/displays adequate comfort level or baseline comfort level  5/8/2025 0501 by Shiv Kovacs, RN  Outcome: Progressing  Flowsheets (Taken 5/8/2025 0501)  Verbalizes/displays adequate comfort level or baseline comfort level:   Assess pain using appropriate pain scale   Administer analgesics based on type and severity of pain and evaluate response  Note: Ongoing complaints of lower back pain.  Repositioned for comfort, Heat pack applied, Lidoderm to lower back and Tylenol PRN.  Oxycodone initially held due to low BP's.  BP consistently above 100 after MN.  GERMÁN Shelley made aware. Stated to give Midodrine 10mg x1 and then Oxycodone OK 30-60min after.       Problem: Safety - Adult  Goal: Free from fall injury  5/8/2025 0501 by Shiv Kovacs, RN  Outcome: Progressing  Flowsheets (Taken 5/8/2025 0501)  Free From Fall Injury: Instruct family/caregiver on patient safety  Note: Identified as a high fall risk.  Fall precautions in place and bed alarm in use.  Bed in locked at low position.  SR up x3.

## 2025-05-08 NOTE — PROGRESS NOTES
V2.0    [facility] Progress Note      Name:  Aimee Taylor /Age/Sex: 1935  (89 y.o. female)   MRN & CSN:  [id number] & [id number] Encounter Date/Time: 2025 9:35 AM EDT   Location:  [facility]445-01 PCP: No primary care provider on file.     Attending:Lilia Wood MD       Hospital Day: 10    HPI:    Assessment and Recommendations     Hospital course:    Aimee Taylor is a 89 y.o. female with pmh of ESRD, hemodialysis, hypertension coronary artery disease who presents back pain along with missing hemodialysis    Plan:     ESRD on hemodialysis-with hypotension discussed with nephrology.  They will increase the dry weight.  Will hold all blood pressure meds  - Hyperkalemia admission improved  - Dialysis per nephrology    Hypertension-blood pressure, dropped further.  Will hold blood pressure medications, got a dose of midodrine  - Episodes of hypotension in the hospital.  Blood pressure medications being adjusted      Elevated troponin-with cardiac abnormality noted on CT, echo concentric hypertrophy May need outpatient cardiac MRI  -CT scan shows hypoenhancement 2.5 cm in the subendocardial region of the left lateral ventricular wall which could be acute or chronic infarct or hypoperfusion  - I discussed with cardiology concerned that the CT finding is possibly a papillary muscle.  Echo was obtained which showed EF of 65 to 70% with septal thickening and severe concentric hypertrophy.  Mild hypokinesis of the basal inferoseptal area with grade 1 diastolic dysfunction  - May need a cardiac MRI as an outpatient to exclude infiltrative disease    Severe back pain, acute on chronic back pain-discussed with neurosurgery, will tryTSLO brace, IR consulted to see if patient is a candidate for kyphoplasty  -MRI thoracic lumbar spine shows multiple level lumbar spondylosis cysts, age indeterminate subacute to chronic endplate fracture of T12-L1 and L2.  Minimal superior endplate compression of L3  - CT abdomen

## 2025-05-08 NOTE — PLAN OF CARE
Problem: Discharge Planning  Goal: Discharge to home or other facility with appropriate resources  5/8/2025 0132 by Milli Ledezma RN  Outcome: Progressing  5/7/2025 1712 by Shiv Lind RN  Outcome: Progressing  Flowsheets (Taken 5/7/2025 1712)  Discharge to home or other facility with appropriate resources:   Identify barriers to discharge with patient and caregiver   Arrange for needed discharge resources and transportation as appropriate   Identify discharge learning needs (meds, wound care, etc)   Arrange for interpreters to assist at discharge as needed   Refer to discharge planning if patient needs post-hospital services based on physician order or complex needs related to functional status, cognitive ability or social support system     Problem: Pain  Goal: Verbalizes/displays adequate comfort level or baseline comfort level  5/8/2025 0132 by Milli Ledezma RN  Outcome: Progressing  5/7/2025 1712 by Shiv Lind RN  Outcome: Progressing  Flowsheets (Taken 5/7/2025 1712)  Verbalizes/displays adequate comfort level or baseline comfort level:   Encourage patient to monitor pain and request assistance   Assess pain using appropriate pain scale   Administer analgesics based on type and severity of pain and evaluate response   Implement non-pharmacological measures as appropriate and evaluate response   Consider cultural and social influences on pain and pain management   Notify Licensed Independent Practitioner if interventions unsuccessful or patient reports new pain     Problem: Safety - Adult  Goal: Free from fall injury  5/8/2025 0132 by Milli Ledezma RN  Outcome: Progressing  5/7/2025 1712 by Shiv Lind RN  Outcome: Progressing  Flowsheets (Taken 5/7/2025 1712)  Free From Fall Injury: Instruct family/caregiver on patient safety     Problem: Skin/Tissue Integrity  Goal: Skin integrity remains intact  Description: 1.  Monitor for areas of redness and/or skin breakdown2.  Assess  vascular access sites hourly3.  Every 4-6 hours minimum:  Change oxygen saturation probe site4.  Every 4-6 hours:  If on nasal continuous positive airway pressure, respiratory therapy assess nares and determine need for appliance change or resting period  5/8/2025 0132 by Milli Ledezma RN  Outcome: Progressing  5/7/2025 1712 by Shiv Lind RN  Outcome: Progressing  Flowsheets (Taken 5/7/2025 1712)  Skin Integrity Remains Intact:   Monitor for areas of redness and/or skin breakdown   Turn and reposition as indicated   Assess need for specialty bed   Positioning devices   Pressure redistribution bed/mattress (bed type)     Problem: ABCDS Injury Assessment  Goal: Absence of physical injury  5/8/2025 0132 by Milli Ledezma RN  Outcome: Progressing  5/7/2025 1712 by Shiv Lind RN  Outcome: Progressing  Flowsheets (Taken 5/7/2025 1712)  Absence of Physical Injury: Implement safety measures based on patient assessment     Problem: Respiratory - Adult  Goal: Achieves optimal ventilation and oxygenation  5/8/2025 0132 by Milli Ledezma RN  Outcome: Progressing  5/7/2025 1712 by Shiv Lind RN  Outcome: Progressing  Flowsheets (Taken 5/6/2025 1804)  Achieves optimal ventilation and oxygenation:   Assess for changes in respiratory status   Assess for changes in mentation and behavior   Position to facilitate oxygenation and minimize respiratory effort   Oxygen supplementation based on oxygen saturation or arterial blood gases   Initiate smoking cessation protocol as indicated   Encourage broncho-pulmonary hygiene including cough, deep breathe, incentive spirometry   Assess and instruct to report shortness of breath or any respiratory difficulty   Respiratory therapy support as indicated   Assess the need for suctioning and aspirate as needed     Problem: Cardiovascular - Adult  Goal: Maintains optimal cardiac output and hemodynamic stability  5/8/2025 0132 by Milli Ledezma RN  Outcome:

## 2025-05-08 NOTE — PROGRESS NOTES
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, dementia, HTN, CAD/CHF, s/p CABG, chronic back pain, who was recently admitted at [facility]-presented to ED who presented to ED from NH after refusing to go to HD yesterday.  She reports back pain that is chronic.  She is a poor historian and unable to provide history.  She denies any chest pain or SOB.  She does not know her HD unit location.  Labs in ED revealed hyperkalemia with   -Dr. Quiñones from [place] nephrology was consulted-he forwarded consulted to [facility] as patient is followed by us    Subjective:    -pt seen and examined  -PMSHx and meds reviewed  -No family at bedside    BP low after HD, now better this morning.    ROS: neg Cp/SOB/confused      PHYSICAL EXAM:    Vitals:    BP (!) 118/56   Pulse 60   Temp 98 °F (36.7 °C)   Resp 14   Ht 1.524 m (5')   Wt 73.5 kg (162 lb 0.6 oz)   SpO2 93%   BMI 31.65 kg/m²   I/O last 3 completed shifts:  In: 940 [P.O.:930; I.V.:10]  Out: 0   No intake/output data recorded.    Physical Exam:  Gen: Elderly female, NAD.    HEENT: Very hard of hearing.  CV: RRR, +S1/S2  Lungs: Good respiratory effort, diminished in bases  Abd: S/NT +BS  Ext: trace edema, no cyanosis  Skin: Warm.  No rashes appreciated.  Neuro: to name only  Joints: No erythema noted over joints.  Access: right TDC c/d/I.    DATA:    CBC:   Lab Results   Component Value Date/Time    WBC 6.4 05/07/2025 03:58 AM    RBC 3.54 05/07/2025 03:58 AM    HGB 11.0 05/07/2025 03:58 AM    HCT 32.3 05/07/2025 03:58 AM    MCV 91.2 05/07/2025 03:58 AM    MCH 31.1 05/07/2025 03:58 AM    MCHC 34.1 05/07/2025 03:58 AM    RDW 14.6 05/07/2025 03:58 AM     05/07/2025 03:58 AM    MPV 9.4 05/07/2025 03:58 AM     BMP:    Lab Results   Component Value Date/Time     05/07/2025

## 2025-05-08 NOTE — CONSULTS
IR consulted for possible kyphoplasty. MRI imaging reviewed with Dr Agrawal. Mild chronic appearing fractures. No IR intervention indicated at this time. Communicated with Dr Wood via [employer].

## 2025-05-08 NOTE — PROGRESS NOTES
Cardiology chart update    Echocardiogram reviewed.  It shows severe concentric LV hypertrophy as well as asymmetric thickening of the anterior septum.  Also noteworthy is prominent papillary muscles, most easily seen with after echo contrast was given.      This is likely the anatomy that gave rise to the CT imaging finding of hypoechoic structure near the inferior wall.     Given that there is no evidence of a prior silent MI or significant extensive ischemic heart disease, no further inpatient cardiac workup is necessary.      Please call back with any questionsl      Solis Interiano MD  Interventional Cardiology  [facility]  5/8/2025

## 2025-05-09 LAB
ANION GAP SERPL CALCULATED.3IONS-SCNC: 12 MMOL/L (ref 3–16)
BASOPHILS # BLD: 0 K/UL (ref 0–0.2)
BASOPHILS NFR BLD: 0.3 %
BUN SERPL-MCNC: 46 MG/DL (ref 7–20)
CALCIUM SERPL-MCNC: 9.4 MG/DL (ref 8.3–10.6)
CHLORIDE SERPL-SCNC: 89 MMOL/L (ref 99–110)
CO2 SERPL-SCNC: 24 MMOL/L (ref 21–32)
CREAT SERPL-MCNC: 6.6 MG/DL (ref 0.6–1.2)
DEPRECATED RDW RBC AUTO: 14.5 % (ref 12.4–15.4)
EOSINOPHIL # BLD: 0.2 K/UL (ref 0–0.6)
EOSINOPHIL NFR BLD: 2 %
GFR SERPLBLD CREATININE-BSD FMLA CKD-EPI: 6 ML/MIN/{1.73_M2}
GLUCOSE SERPL-MCNC: 93 MG/DL (ref 70–99)
HCT VFR BLD AUTO: 33.4 % (ref 36–48)
HGB BLD-MCNC: 11.2 G/DL (ref 12–16)
LYMPHOCYTES # BLD: 1.5 K/UL (ref 1–5.1)
LYMPHOCYTES NFR BLD: 19 %
MCH RBC QN AUTO: 30.8 PG (ref 26–34)
MCHC RBC AUTO-ENTMCNC: 33.6 G/DL (ref 31–36)
MCV RBC AUTO: 91.6 FL (ref 80–100)
MONOCYTES # BLD: 0.8 K/UL (ref 0–1.3)
MONOCYTES NFR BLD: 11 %
NEUTROPHILS # BLD: 5.2 K/UL (ref 1.7–7.7)
NEUTROPHILS NFR BLD: 67.7 %
PLATELET # BLD AUTO: 100 K/UL (ref 135–450)
PMV BLD AUTO: 10.4 FL (ref 5–10.5)
POTASSIUM SERPL-SCNC: 5.5 MMOL/L (ref 3.5–5.1)
RBC # BLD AUTO: 3.65 M/UL (ref 4–5.2)
SODIUM SERPL-SCNC: 125 MMOL/L (ref 136–145)
WBC # BLD AUTO: 7.7 K/UL (ref 4–11)

## 2025-05-09 PROCEDURE — 1200000000 HC SEMI PRIVATE

## 2025-05-09 PROCEDURE — 6370000000 HC RX 637 (ALT 250 FOR IP): Performed by: STUDENT IN AN ORGANIZED HEALTH CARE EDUCATION/TRAINING PROGRAM

## 2025-05-09 PROCEDURE — 90935 HEMODIALYSIS ONE EVALUATION: CPT

## 2025-05-09 PROCEDURE — 6370000000 HC RX 637 (ALT 250 FOR IP): Performed by: INTERNAL MEDICINE

## 2025-05-09 PROCEDURE — 6360000002 HC RX W HCPCS: Performed by: STUDENT IN AN ORGANIZED HEALTH CARE EDUCATION/TRAINING PROGRAM

## 2025-05-09 PROCEDURE — 36415 COLL VENOUS BLD VENIPUNCTURE: CPT

## 2025-05-09 PROCEDURE — 36556 INSERT NON-TUNNEL CV CATH: CPT

## 2025-05-09 PROCEDURE — 2500000003 HC RX 250 WO HCPCS: Performed by: STUDENT IN AN ORGANIZED HEALTH CARE EDUCATION/TRAINING PROGRAM

## 2025-05-09 PROCEDURE — 85025 COMPLETE CBC W/AUTO DIFF WBC: CPT

## 2025-05-09 PROCEDURE — 80048 BASIC METABOLIC PNL TOTAL CA: CPT

## 2025-05-09 RX ORDER — MIDODRINE HYDROCHLORIDE 5 MG/1
5 TABLET ORAL
Status: DISCONTINUED | OUTPATIENT
Start: 2025-05-09 | End: 2025-05-10 | Stop reason: HOSPADM

## 2025-05-09 RX ADMIN — SODIUM CHLORIDE, PRESERVATIVE FREE 10 ML: 5 INJECTION INTRAVENOUS at 21:42

## 2025-05-09 RX ADMIN — ATORVASTATIN CALCIUM 40 MG: 40 TABLET, FILM COATED ORAL at 21:42

## 2025-05-09 RX ADMIN — HEPARIN SODIUM 5000 UNITS: 5000 INJECTION INTRAVENOUS; SUBCUTANEOUS at 05:30

## 2025-05-09 RX ADMIN — MIDODRINE HYDROCHLORIDE 5 MG: 5 TABLET ORAL at 08:19

## 2025-05-09 RX ADMIN — PRIMIDONE 50 MG: 50 TABLET ORAL at 16:39

## 2025-05-09 RX ADMIN — SODIUM CHLORIDE, PRESERVATIVE FREE 10 ML: 5 INJECTION INTRAVENOUS at 08:19

## 2025-05-09 RX ADMIN — OXYCODONE 5 MG: 5 TABLET ORAL at 05:29

## 2025-05-09 RX ADMIN — HEPARIN SODIUM 5000 UNITS: 5000 INJECTION INTRAVENOUS; SUBCUTANEOUS at 21:42

## 2025-05-09 ASSESSMENT — PAIN SCALES - PAIN ASSESSMENT IN ADVANCED DEMENTIA (PAINAD)
BODYLANGUAGE: RELAXED
FACIALEXPRESSION: SMILING OR INEXPRESSIVE
FACIALEXPRESSION: SMILING OR INEXPRESSIVE
BODYLANGUAGE: RELAXED
TOTALSCORE: 0
CONSOLABILITY: NO NEED TO CONSOLE
CONSOLABILITY: NO NEED TO CONSOLE
BREATHING: NORMAL
BREATHING: NORMAL
TOTALSCORE: 0

## 2025-05-09 ASSESSMENT — PAIN SCALES - GENERAL: PAINLEVEL_OUTOF10: 0

## 2025-05-09 ASSESSMENT — PAIN DESCRIPTION - LOCATION: LOCATION: BACK

## 2025-05-09 NOTE — CARE COORDINATION
3:50 PM  From LTC at [place] w OP HD services. Will return to [place] and resume HD at [place].   Nephro following, managing HD  Monitoring BP with HD.   Not a candidate for Kyphoplasty per IR.     CM following.     Electronically signed by Brad Genao RN, CM on 5/9/2025 at 3:52 PM.  Phone: [phone]  Fax: [phone]

## 2025-05-09 NOTE — PROGRESS NOTES
NephrologyProgress Note  [phone]  [phone]   [employer]        Reason for Consult:  ESKD    HISTORY OF PRESENT ILLNESS:                This is a patient with significant past medical history of ESKD on HD, MWF at [facility] dementia, HTN, CAD/CHF, s/p CABG, chronic back pain, who was recently admitted at [facility]-presented to ED who presented to ED from NH after refusing to go to HD yesterday.  She reports back pain that is chronic.  She is a poor historian and unable to provide history.  She denies any chest pain or SOB.  She does not know her HD unit location.  Labs in ED revealed hyperkalemia with   -Dr. Quiñones from [place] nephrology was consulted-he forwarded consulted to [facility] as patient is followed by us    Subjective:    -pt seen and examined  -PMSHx and meds reviewed  -No family at bedside    Due for HD today.  BP overall better.    ROS: neg Cp/SOB      PHYSICAL EXAM:    Vitals:    BP (!) 105/58   Pulse 62   Temp 97 °F (36.1 °C) (Oral)   Resp 16   Ht 1.524 m (5')   Wt 75.1 kg (165 lb 9.1 oz)   SpO2 100%   BMI 32.33 kg/m²   I/O last 3 completed shifts:  In: 810 [P.O.:800; I.V.:10]  Out: 0   No intake/output data recorded.    Physical Exam:  Gen: Elderly female, NAD.    HEENT: Very hard of hearing.  CV: RRR, +S1/S2  Lungs: Good respiratory effort, diminished in bases  Abd: S/NT +BS  Ext: trace edema, no cyanosis  Skin: Warm.  No rashes appreciated.  Neuro: to name only  Joints: No erythema noted over joints.  Access: right TDC c/d/I.    DATA:    CBC:   Lab Results   Component Value Date/Time    WBC 6.4 05/07/2025 03:58 AM    RBC 3.54 05/07/2025 03:58 AM    HGB 11.0 05/07/2025 03:58 AM    HCT 32.3 05/07/2025 03:58 AM    MCV 91.2 05/07/2025 03:58 AM    MCH 31.1 05/07/2025 03:58 AM    MCHC 34.1 05/07/2025 03:58 AM    RDW 14.6 05/07/2025 03:58 AM     05/07/2025 03:58 AM    MPV 9.4 05/07/2025 03:58 AM     BMP:    Lab Results   Component Value Date/Time     05/07/2025 03:58

## 2025-05-09 NOTE — PROGRESS NOTES
V2.0    [facility] Progress Note      Name:  Aimee Taylor /Age/Sex: 1935  (89 y.o. female)   MRN & CSN:  [id number] & [id number] Encounter Date/Time: 2025 9:35 AM EDT   Location:  [facility]4451-01 PCP: No primary care provider on file.     Attending:Lilia Wood MD       Hospital Day: 11    HPI:    Assessment and Recommendations     Hospital course:    Aimee Taylor is a 89 y.o. female with pmh of ESRD, hemodialysis, hypertension coronary artery disease who presents back pain along with missing hemodialysis    Plan:     ESRD on hemodialysis-discussed with nephrology for dialysis today they will increase dry weight  - Hyperkalemia admission improved  - Dialysis per nephrology    Hypertension-overnight blood pressure again dropped to the 70s.  Will start midodrine with holding parameters.  Monitor blood pressure after dialysis  - Episodes of hypotension in the hospital.  Blood pressure medications being adjusted      Elevated troponin-with cardiac abnormality noted on CT, echo concentric hypertrophy -discussed with cardiology.  Most likely finding was prominent papillary muscle.  No further workup needed  -CT scan shows hypoenhancement 2.5 cm in the subendocardial region of the left lateral ventricular wall which could be acute or chronic infarct or hypoperfusion  - I discussed with cardiology concerned that the CT finding is possibly a papillary muscle.  Echo was obtained which showed EF of 65 to 70% with septal thickening and severe concentric hypertrophy.  Mild hypokinesis of the basal inferoseptal area with grade 1 diastolic dysfunction      Severe back pain, acute on chronic back pain-discussed with neurosurgery, will tryTSLO brace, discussed with IR, patient is not a candidate for kyphoplasty  -MRI thoracic lumbar spine shows multiple level lumbar spondylosis cysts, age indeterminate subacute to chronic endplate fracture of T12-L1 and L2.  Minimal superior endplate compression of L3  - CT  abdomen done at [facility] on 4/23/2025 showed an L2 fracture.  Per  [facility]  note they recommended MRI with T SLO brace.  Per family patient never got a T SLO brace on discharge  - Neurosurgery was consulted recommended no further workup  - MRI ordered over here pending.  After MRI is done we will discuss if patient is a candidate for kyphoplasty      H/o  of bradycardia status pacemaker  -Has a [employer] pacer      I spent > 55  minutes in the care of this patient.  Over 50% of that time was in face-to-face counseling regarding disease process, diagnostic testing, preventative measures, and answering patient and family questions.     Diet ADULT DIET; Regular; Low Fat/Low Chol/High Fiber/2 gm Na; Low Potassium (Less than 3000 mg/day); 1800 ml  ADULT ORAL NUTRITION SUPPLEMENT; Lunch; Other Oral Supplement; Expedite jello   DVT Prophylaxis [] Lovenox, []  Heparin, [] SCDs, [] Ambulation,  [] Eliquis, [] Xarelto  [] Coumadin   Code Status Full Code   Disposition   1 to 2 days     Surrogate Decision Maker/ POA         MDM  [x] High (any 2 of A, B, or C)    A. Problems (any 1)  [x] Acute/Chronic Illness/injury posing threat to life or bodily function:    [] Severe exacerbation of chronic illness:    ---------------------------------------------------------------------  B. Risk of Treatment (any 1)   [] IV ABX requiring serial renal monitoring for nephrotoxicity:     [] IV Narcotic analgesia for adverse drug reaction  [] IV diuresis requiring serial monitoring for renal impairment and electrolyte derangements  [] Critical electrolyte abnormalities requiring IV replacement and close serial monitoring  [] Insulin - monitoring serial FSBS for Hypoglycemic adverse drug reaction  [] Anticoagulation requiring serial monitoring of coagulation factors  [] IV/IM Controlled Substances order (any 1)   [] One-time Order including Drug Name/Route, Reason Ordered:   [] Scheduled Order including Drug Name/Route, Reason

## 2025-05-09 NOTE — PLAN OF CARE
Problem: Discharge Planning  Goal: Discharge to home or other facility with appropriate resources  5/9/2025 1654 by Aria Ludwig, RN  Outcome: Progressing  Flowsheets (Taken 5/9/2025 1654)  Discharge to home or other facility with appropriate resources:   Identify barriers to discharge with patient and caregiver   Arrange for needed discharge resources and transportation as appropriate   Identify discharge learning needs (meds, wound care, etc)   Arrange for interpreters to assist at discharge as needed   Refer to discharge planning if patient needs post-hospital services based on physician order or complex needs related to functional status, cognitive ability or social support system     Problem: Pain  Goal: Verbalizes/displays adequate comfort level or baseline comfort level  5/9/2025 1654 by Aria Ludwig, RN  Outcome: Progressing  Flowsheets (Taken 5/9/2025 1654)  Verbalizes/displays adequate comfort level or baseline comfort level:   Encourage patient to monitor pain and request assistance   Assess pain using appropriate pain scale   Administer analgesics based on type and severity of pain and evaluate response   Implement non-pharmacological measures as appropriate and evaluate response   Consider cultural and social influences on pain and pain management   Notify Licensed Independent Practitioner if interventions unsuccessful or patient reports new pain     Problem: Safety - Adult  Goal: Free from fall injury  5/9/2025 1654 by Aria Ludwig, RN  Outcome: Progressing  Flowsheets (Taken 5/9/2025 1654)  Free From Fall Injury:   Based on caregiver fall risk screen, instruct family/caregiver to ask for assistance with transferring infant if caregiver noted to have fall risk factors   Instruct family/caregiver on patient safety     Problem: Skin/Tissue Integrity  Goal: Skin integrity remains intact  Description: 1.  Monitor for areas of redness and/or skin breakdown2.  Assess vascular access sites hourly3.

## 2025-05-09 NOTE — PLAN OF CARE
Problem: Discharge Planning  Goal: Discharge to home or other facility with appropriate resources  Outcome: Progressing     Problem: Pain  Goal: Verbalizes/displays adequate comfort level or baseline comfort level  Outcome: Progressing     Problem: Safety - Adult  Goal: Free from fall injury  Outcome: Progressing     Problem: Skin/Tissue Integrity  Goal: Skin integrity remains intact  Description: 1.  Monitor for areas of redness and/or skin breakdown2.  Assess vascular access sites hourly3.  Every 4-6 hours minimum:  Change oxygen saturation probe site4.  Every 4-6 hours:  If on nasal continuous positive airway pressure, respiratory therapy assess nares and determine need for appliance change or resting period  5/9/2025 0732 by Milli Ledezma, RN  Note: Turned patient every 2 hours to keep skin looking good   5/9/2025 0732 by Milli Ledezma, RN  Outcome: Progressing     Problem: ABCDS Injury Assessment  Goal: Absence of physical injury  Outcome: Progressing     Problem: Respiratory - Adult  Goal: Achieves optimal ventilation and oxygenation  Outcome: Progressing     Problem: Cardiovascular - Adult  Goal: Maintains optimal cardiac output and hemodynamic stability  Outcome: Progressing  Goal: Absence of cardiac dysrhythmias or at baseline  Outcome: Progressing     Problem: Metabolic/Fluid and Electrolytes - Adult  Goal: Electrolytes maintained within normal limits  Outcome: Progressing  Goal: Hemodynamic stability and optimal renal function maintained  Outcome: Progressing     Problem: Nutrition Deficit:  Goal: Optimize nutritional status  Outcome: Progressing

## 2025-05-09 NOTE — PROGRESS NOTES
Treatment time: 3 hours    Net UF: 600 ml based on dry weight    Pre weight: 75.1 kg  Post weight: 74.5 kg  EDW: 74.5 kg    Access used: Right CVC  Access function: Access site located on the Right chest had clean dry and intact CVC dressing. No signs of infection noted. No redness, hematoma nor swelling was observed. No discharges was seen. Both ports had good flow. Cleaned site and charged dressing aseptically    Medications or blood products given: heparin dwell    Regular outpatient schedule: M,W,F    Summary of response to treatment: 12:40: Treatment set at 600 ml for 3 hours based on dry weight via Right CVC. Access site located on the Right chest had clean dry and intact CVC dressing. No signs of infection noted. No redness, hematoma nor swelling was observed. No discharges was seen. Both ports had good flow. Cleaned site and charged dressing aseptically. Parameters set accordingly. Hooked to HD machine. Monitored from time to time. 15:40: Treatment completed. Returned blood. Hd tolerated    Copy of dialysis treatment record placed in chart, to be scanned into EMR.

## 2025-05-10 VITALS
HEART RATE: 77 BPM | BODY MASS INDEX: 32.03 KG/M2 | OXYGEN SATURATION: 100 % | TEMPERATURE: 97.5 F | RESPIRATION RATE: 16 BRPM | HEIGHT: 60 IN | SYSTOLIC BLOOD PRESSURE: 129 MMHG | WEIGHT: 163.14 LBS | DIASTOLIC BLOOD PRESSURE: 99 MMHG

## 2025-05-10 PROCEDURE — 6360000002 HC RX W HCPCS: Performed by: STUDENT IN AN ORGANIZED HEALTH CARE EDUCATION/TRAINING PROGRAM

## 2025-05-10 PROCEDURE — 6370000000 HC RX 637 (ALT 250 FOR IP): Performed by: INTERNAL MEDICINE

## 2025-05-10 RX ORDER — OXYCODONE HYDROCHLORIDE 5 MG/1
5 TABLET ORAL EVERY 6 HOURS PRN
Qty: 12 TABLET | Refills: 0 | Status: SHIPPED | OUTPATIENT
Start: 2025-05-10 | End: 2025-05-13

## 2025-05-10 RX ORDER — OXYCODONE HYDROCHLORIDE 5 MG/1
5 TABLET ORAL EVERY 6 HOURS PRN
Qty: 12 TABLET | Refills: 0 | Status: SHIPPED | OUTPATIENT
Start: 2025-05-10 | End: 2025-05-10

## 2025-05-10 RX ORDER — ACETAMINOPHEN 500 MG
1000 TABLET ORAL EVERY 8 HOURS
Qty: 120 TABLET | Refills: 0 | Status: SHIPPED | OUTPATIENT
Start: 2025-05-10

## 2025-05-10 RX ORDER — CARVEDILOL 3.12 MG/1
3.12 TABLET ORAL 2 TIMES DAILY WITH MEALS
Status: DISCONTINUED | OUTPATIENT
Start: 2025-05-10 | End: 2025-05-10 | Stop reason: HOSPADM

## 2025-05-10 RX ORDER — CARVEDILOL 3.12 MG/1
3.12 TABLET ORAL 2 TIMES DAILY WITH MEALS
Qty: 60 TABLET | Refills: 3 | Status: SHIPPED | OUTPATIENT
Start: 2025-05-10

## 2025-05-10 RX ADMIN — OXYCODONE 5 MG: 5 TABLET ORAL at 09:54

## 2025-05-10 RX ADMIN — OXYCODONE 5 MG: 5 TABLET ORAL at 02:28

## 2025-05-10 RX ADMIN — HEPARIN SODIUM 5000 UNITS: 5000 INJECTION INTRAVENOUS; SUBCUTANEOUS at 04:38

## 2025-05-10 ASSESSMENT — PAIN SCALES - GENERAL
PAINLEVEL_OUTOF10: 0
PAINLEVEL_OUTOF10: 8
PAINLEVEL_OUTOF10: 8
PAINLEVEL_OUTOF10: 0

## 2025-05-10 ASSESSMENT — PAIN - FUNCTIONAL ASSESSMENT: PAIN_FUNCTIONAL_ASSESSMENT: ACTIVITIES ARE NOT PREVENTED

## 2025-05-10 ASSESSMENT — PAIN DESCRIPTION - LOCATION
LOCATION: BACK
LOCATION: GENERALIZED

## 2025-05-10 ASSESSMENT — PAIN SCALES - WONG BAKER: WONGBAKER_NUMERICALRESPONSE: NO HURT

## 2025-05-10 ASSESSMENT — PAIN DESCRIPTION - ORIENTATION: ORIENTATION: OTHER (COMMENT)

## 2025-05-10 ASSESSMENT — PAIN DESCRIPTION - DESCRIPTORS
DESCRIPTORS: ACHING
DESCRIPTORS: ACHING

## 2025-05-10 NOTE — PROGRESS NOTES
NephrologyProgress Note  [phone]  [phone]   [employer]        Reason for Consult:  ESKD    HISTORY OF PRESENT ILLNESS:                This is a patient with significant past medical history of ESKD on HD, MWF at [facility], dementia, HTN, CAD/CHF, s/p CABG, chronic back pain, who was recently admitted at [facility]-presented to ED who presented to ED from NH after refusing to go to HD yesterday.  She reports back pain that is chronic.  She is a poor historian and unable to provide history.  She denies any chest pain or SOB.  She does not know her HD unit location.     Subjective:    -pt seen and examined  -PMSHx and meds reviewed  -No family at bedside    Mrs. Taylor had HD yesterday.  No major issues with it.  No complaints.      ROS: neg Cp/SOB      PHYSICAL EXAM:    Vitals:    BP (!) 146/73   Pulse 76   Temp 97.9 °F (36.6 °C) (Oral)   Resp 18   Ht 1.524 m (5')   Wt 74 kg (163 lb 2.3 oz)   SpO2 96%   BMI 31.86 kg/m²   I/O last 3 completed shifts:  In: 720 [P.O.:720]  Out: -   No intake/output data recorded.    Physical Exam:  Gen: Elderly female, NAD.    HEENT: Very hard of hearing.  CV: RRR, +S1/S2  Lungs: Good respiratory effort, clear bilaterally.  Abd: S/NT +BS  Ext: trace edema, no cyanosis  Skin: Warm.  No rashes appreciated.  Neuro: to name only  Joints: No erythema noted over joints.  Access: right TDC c/d/I.    DATA:    CBC:   Lab Results   Component Value Date/Time    WBC 7.7 05/09/2025 11:19 AM    RBC 3.65 05/09/2025 11:19 AM    HGB 11.2 05/09/2025 11:19 AM    HCT 33.4 05/09/2025 11:19 AM    MCV 91.6 05/09/2025 11:19 AM    MCH 30.8 05/09/2025 11:19 AM    MCHC 33.6 05/09/2025 11:19 AM    RDW 14.5 05/09/2025 11:19 AM     05/09/2025 11:19 AM    MPV 10.4 05/09/2025 11:19 AM     BMP:    Lab Results   Component Value Date/Time     05/09/2025 11:19 AM    K 5.5 05/09/2025 11:19 AM    CL 89 05/09/2025 11:19 AM    CO2 24 05/09/2025 11:19 AM    BUN 46 05/09/2025 11:19 AM

## 2025-05-10 NOTE — CARE COORDINATION
Case Management Assessment            Discharge Note                    Date / Time of Note: 5/10/2025 8:45 AM                  Discharge Note Completed by: MANDA Velasco, LSW    Patient Name: Aimee Taylor   YOB: 1935  Diagnosis: Hyperkalemia [E87.5]  ESRD needing dialysis (HCC) [N18.6, Z99.2]  Hypervolemia, unspecified hypervolemia type [E87.70]   Date / Time: 4/29/2025  9:06 AM    Current PCP: No primary care provider on file.  Clinic patient: No    Hospitalization in the last 30 days: No       Advance Directives:  Code Status: Full Code  [place] DNR form completed and on chart: No    Financial:  Payor: SANDRA ELLINGTON / Plan: SANDRA ELLINGTON DUAL / Product Type: *No Product type* /      Pharmacy:    [id number] [place], [place] - [address] - P [phone] - F [phone] [address]  [facility] [address]  Phone: [phone] Fax: [phone]      Assistance purchasing medications?: Potential Assistance Purchasing Medications: No  Assistance provided by Case Management: None at this time    Does patient want to participate in local refill/ meds to beds program?: No    Meds To Beds General Rules:  1. Can ONLY be done Monday- Friday between 8:30am-5pm  2. Prescription(s) must be in pharmacy by 3pm to be filled same day  3.Copy of patient's insurance/ prescription drug card and patient face sheet must be sent along with the prescription(s)  4. Cost of Rx cannot be added to hospital bill. If financial assistance is needed, please contact unit  or ;  or  CANNOT provide pharmacy voucher for patients co-pays  5. Patients can then  the prescription on their way out of the hospital at discharge, or pharmacy can deliver to the bedside if staff is available. (payment due at time of pick-up or delivery - cash, check, or card accepted)     Able to afford home medications/ co-pay costs:

## 2025-05-10 NOTE — DISCHARGE INSTR - COC
Status Clean;Dry;Intact 05/10/25 0900   Wound Cleansed Not Cleansed 05/08/25 1550   Dressing/Treatment Foam 05/10/25 0900   Offloading for Diabetic Foot Ulcers Offloading boot 05/10/25 0900   Wound Assessment Pink/red 05/10/25 0900   Drainage Amount None (dry) 05/10/25 0900   Odor None 05/10/25 0900   Daisha-wound Assessment Intact 05/08/25 1550   Number of days: 10        Elimination:  Continence:   Bowel: Yes  Bladder: Yes  Urinary Catheter: None   Colostomy/Ileostomy/Ileal Conduit: No       Date of Last BM:     Intake/Output Summary (Last 24 hours) at 5/10/2025 1305  Last data filed at 5/10/2025 0900  Gross per 24 hour   Intake 780 ml   Output --   Net 780 ml     I/O last 3 completed shifts:  In: 720 [P.O.:720]  Out: -     Safety Concerns:     At Risk for Falls    Impairments/Disabilities:      Hearing    Nutrition Therapy:  Current Nutrition Therapy:   - Oral Diet:  General    Routes of Feeding: Oral  Liquids: Thin Liquids  Daily Fluid Restriction: no  Last Modified Barium Swallow with Video (Video Swallowing Test): not done    Treatments at the Time of Hospital Discharge:   Respiratory Treatments: no  Oxygen Therapy:  is not on home oxygen therapy.  Ventilator:    - No ventilator support    Rehab Therapies: Physical Therapy and Occupational Therapy  Weight Bearing Status/Restrictions: No weight bearing restrictions  Other Medical Equipment (for information only, NOT a DME order):  hospital bed  Other Treatments: no    Patient's personal belongings (please select all that are sent with patient):  None    RN SIGNATURE:  Electronically signed by Aylin Juarez RN on 5/10/25 at 1:08 PM EDT    CASE MANAGEMENT/SOCIAL WORK SECTION    Inpatient Status Date: 4/29/25    Readmission Risk Assessment Score:  [facility] RISK OF UNPLANNED READMISSION 2.0             15.3 Total Score        Discharging to Facility/ Agency   Name:   Address:  Phone:  Fax:    Dialysis Facility (if applicable)   Name:  Address:  Dialysis

## 2025-05-10 NOTE — DISCHARGE SUMMARY
V2.0  Discharge Summary    Name:  Aimee Taylor /Age/Sex: 1935 (89 y.o. female)   Admit Date: 2025  Discharge Date: 5/10/25    MRN & CSN:  [id number] & [id number] Encounter Date and Time 5/10/25 10:41 AM EDT    Attending:  Lilia Wood MD Discharging Provider: Lilia Wood MD       Hospital Course:     Hospital course:     Aimee Taylor is a 89 y.o. female with pmh of ESRD, hemodialysis, hypertension coronary artery disease who presents back pain along with missing hemodialysis.  Nephrology was consulted.  Patient underwent emergent dialysis.  In the hospital patient's blood pressure continued to drop and her blood pressure medications have been adjusted multiple times.  At the time of discharge patient will be sent on low-dose of Coreg.  Nephrology would like her dry weight target to be at 74.5.  They will communicated with her dialysis center.  Patient has chronic severe back pain.  MRI shows multilevel lumbar spondylosis with age-indeterminate T12 L1-L2 fracture with superior compression of L3.  Discussed with neurosurgery and interventional radiology.  Patient is not a candidate for any kind of procedure.  They think patient can try a T SLO brace but it would not be of much use.  Will continue pain medications on discharge     Plan:      ESRD on hemodialysis-discussed with nephrology ok, to discharge today.  Target weight to be kept at 74.5  - Hyperkalemia admission improved  - Dialysis per nephrology     Hypertension with episodes of hypotension in the hospital  - Discharge on low-dose Coreg        Elevated troponin-with cardiac abnormality noted on CT, echo concentric hypertrophy -discussed with cardiology.  Most likely finding was prominent papillary muscle.  No further workup needed  -CT scan shows hypoenhancement 2.5 cm in the subendocardial region of the left lateral ventricular wall which could be acute or chronic infarct or hypoperfusion  - I discussed with cardiology  is moderately increased (42-48 mL/m2) mL/m2.   Aortic Valve: Poor visualization, Bicuspid valve with commisural fusion of the left and noncoronary cusps. Mild regurgitation.   Tricuspid Valve: Mild to moderate regurgitation. Estimated RVSP of 41-43 mmHg, assuming RAP 8-10 mmHg.   Aorta: Normal sized aortic root. Dilated ascending aorta. Ao ascending diameter is 3.2 cm.   Consider cardiac MRI if indicated to further evaluate myocardium and exclude infiltrative disease.       CBC:   Recent Labs     05/09/25  1119   WBC 7.7   HGB 11.2*   *     BMP:    Recent Labs     05/09/25  1119   *   K 5.5*   CL 89*   CO2 24   BUN 46*   CREATININE 6.6*   GLUCOSE 93     Hepatic: No results for input(s): \"AST\", \"ALT\", \"BILITOT\", \"ALKPHOS\" in the last 72 hours.    Invalid input(s): \"ALB\"  Lipids: No results found for: \"CHOL\", \"HDL\", \"TRIG\"  Hemoglobin A1C: No results found for: \"LABA1C\"  TSH: No results found for: \"TSH\"  Troponin: No results found for: \"TROPONINT\"  Lactic Acid: No results for input(s): \"LACTA\" in the last 72 hours.  BNP: No results for input(s): \"PROBNP\" in the last 72 hours.  UA:  Lab Results   Component Value Date/Time    NITRU Negative 04/29/2025 11:23 AM    COLORU Yellow 04/29/2025 11:23 AM    PHUR 7.5 04/29/2025 11:23 AM    WBCUA 3-5 04/29/2025 11:23 AM    RBCUA 0-2 04/29/2025 11:23 AM    BACTERIA 1+ 04/29/2025 11:23 AM    CLARITYU Clear 04/29/2025 11:23 AM    LEUKOCYTESUR SMALL 04/29/2025 11:23 AM    UROBILINOGEN 0.2 04/29/2025 11:23 AM    BILIRUBINUR Negative 04/29/2025 11:23 AM    BLOODU TRACE-INTACT 04/29/2025 11:23 AM    GLUCOSEU Negative 04/29/2025 11:23 AM    KETUA Negative 04/29/2025 11:23 AM     Urine Cultures: No results found for: \"LABURIN\"  Blood Cultures: No results found for: \"BC\"  No results found for: \"BLOODCULT2\"  Organism: No results found for: \"ORG\"    Time Spent Discharging patient >35   minutes    Electronically signed by Lilia Wood MD on 5/10/2025 at 10:41 AM

## 2025-05-10 NOTE — PROGRESS NOTES
..4 Eyes Skin Assessment     NAME:  Aimee Taylor  YOB: 1935  MEDICAL RECORD NUMBER:  [id number]    The patient is being assessed for  Admission    I agree that at least one RN has performed a thorough Head to Toe Skin Assessment on the patient. ALL assessment sites listed below have been assessed.      Areas assessed by both nurses:    Head, Face, Ears, Shoulders, Back, Chest, Arms, Elbows, Hands, Sacrum. Buttock, Coccyx, Ischium, Legs. Feet and Heels, and Under Medical Devices         Does the Patient have a Wound? No noted wound(s)       Jarek Prevention initiated by RN: Yes  Wound Care Orders initiated by RN: No    Pressure Injury (Stage 3,4, Unstageable, DTI, NWPT, and Complex wounds) if present, place Wound referral order by RN under : No    New Ostomies, if present place, Ostomy referral order under : No     Nurse 1 eSignature: Electronically signed by Elizabeth Mota RN on 5/9/25 at 8:27 PM EDT    **SHARE this note so that the co-signing nurse can place an eSignature**    Nurse 2 eSignature: Electronically signed by Effie Kessler RN on 5/10/25 at 12:16 AM EDT    Brittany Mcdowell)  Gynecologic Oncology; Obstetrics and Gynecology  [address] [place] [address]  Phone: [phone]  Fax: [phone]  Follow Up Time:

## 2025-05-10 NOTE — PROGRESS NOTES
Discharged to [facility] via ambulance with belongings. Patient denies pain or needs. No distress noted.

## 2025-05-10 NOTE — PLAN OF CARE
Problem: Discharge Planning  Goal: Discharge to home or other facility with appropriate resources  5/9/2025 2026 by Elizabeth Mota RN  Outcome: Progressing  5/9/2025 1654 by Arai Ludwig RN  Outcome: Progressing  Flowsheets (Taken 5/9/2025 1654)  Discharge to home or other facility with appropriate resources:   Identify barriers to discharge with patient and caregiver   Arrange for needed discharge resources and transportation as appropriate   Identify discharge learning needs (meds, wound care, etc)   Arrange for interpreters to assist at discharge as needed   Refer to discharge planning if patient needs post-hospital services based on physician order or complex needs related to functional status, cognitive ability or social support system  5/9/2025 0732 by Milli Ledezma RN  Outcome: Progressing     Problem: Pain  Goal: Verbalizes/displays adequate comfort level or baseline comfort level  5/9/2025 2026 by Elizabeth Mota RN  Outcome: Progressing  5/9/2025 1654 by Aria Ludwig RN  Outcome: Progressing  Flowsheets (Taken 5/9/2025 1654)  Verbalizes/displays adequate comfort level or baseline comfort level:   Encourage patient to monitor pain and request assistance   Assess pain using appropriate pain scale   Administer analgesics based on type and severity of pain and evaluate response   Implement non-pharmacological measures as appropriate and evaluate response   Consider cultural and social influences on pain and pain management   Notify Licensed Independent Practitioner if interventions unsuccessful or patient reports new pain  5/9/2025 0732 by Milli Ledezma RN  Outcome: Progressing     Problem: Safety - Adult  Goal: Free from fall injury  5/9/2025 2026 by Elizabeth Mota RN  Outcome: Progressing  5/9/2025 1654 by Aria Ludwig RN  Outcome: Progressing  Flowsheets (Taken 5/9/2025 1654)  Free From Fall Injury:   Based on caregiver fall risk screen, instruct family/caregiver to ask for assistance with

## 2025-05-10 NOTE — PLAN OF CARE
Problem: Discharge Planning  Goal: Discharge to home or other facility with appropriate resources  5/9/2025 2233 by Elizabeth Mota RN  Outcome: Progressing  5/9/2025 2026 by Elizabeth Mota RN  Outcome: Progressing  5/9/2025 1654 by Aria Ludwig RN  Outcome: Progressing  Flowsheets (Taken 5/9/2025 1654)  Discharge to home or other facility with appropriate resources:   Identify barriers to discharge with patient and caregiver   Arrange for needed discharge resources and transportation as appropriate   Identify discharge learning needs (meds, wound care, etc)   Arrange for interpreters to assist at discharge as needed   Refer to discharge planning if patient needs post-hospital services based on physician order or complex needs related to functional status, cognitive ability or social support system     Problem: Pain  Goal: Verbalizes/displays adequate comfort level or baseline comfort level  5/9/2025 2233 by Elizabeth Mota RN  Outcome: Progressing  5/9/2025 2026 by Elizabeth Mota RN  Outcome: Progressing  5/9/2025 1654 by Aria Ludwig RN  Outcome: Progressing  Flowsheets (Taken 5/9/2025 1654)  Verbalizes/displays adequate comfort level or baseline comfort level:   Encourage patient to monitor pain and request assistance   Assess pain using appropriate pain scale   Administer analgesics based on type and severity of pain and evaluate response   Implement non-pharmacological measures as appropriate and evaluate response   Consider cultural and social influences on pain and pain management   Notify Licensed Independent Practitioner if interventions unsuccessful or patient reports new pain     Problem: Safety - Adult  Goal: Free from fall injury  5/9/2025 2233 by Elizabeth Mota RN  Outcome: Progressing  5/9/2025 2026 by Elizabeth Mota RN  Outcome: Progressing  5/9/2025 1654 by Aria Ludwig RN  Outcome: Progressing  Flowsheets (Taken 5/9/2025 1654)  Free From Fall Injury:   Based on caregiver fall risk screen,

## 2025-05-10 NOTE — PROGRESS NOTES
V2.0    [facility] [place] Progress Note      Name:  Aimee Taylor /Age/Sex: 1935  (89 y.o. female)   MRN & CSN:  [id number] & [id number] Encounter Date/Time: 5/10/2025 9:35 AM EDT   Location:  [facility]/8200-04 PCP: No primary care provider on file.     Attending:Lilia Wood MD       Hospital Day: 12    HPI:    Assessment and Recommendations     Hospital course:    Aimee Taylor is a 89 y.o. female with pmh of ESRD, hemodialysis, hypertension coronary artery disease who presents back pain along with missing hemodialysis.  Nephrology was consulted.  Patient underwent emergent dialysis.  In the hospital patient's blood pressure continued to drop and her blood pressure medications have been adjusted multiple times.  At the time of discharge patient will be sent on low-dose of Coreg.  Nephrology would like her dry weight target to be at 74.5.  They will communicated with her dialysis center.  Patient has chronic severe back pain.  MRI shows multilevel lumbar spondylosis with age-indeterminate T12 L1-L2 fracture with superior compression of L3.  Discussed with neurosurgery and interventional radiology.  Patient is not a candidate for any kind of procedure.  They think patient can try a T SLO brace but it would not be of much use.  Will continue pain medications on discharge    Plan:     ESRD on hemodialysis-discussed with nephrology ok, to discharge today.  Target weight to be kept at 74.5  - Hyperkalemia admission improved  - Dialysis per nephrology    Hypertension with episodes of hypotension in the hospital  - Discharge on low-dose Coreg      Elevated troponin-with cardiac abnormality noted on CT, echo concentric hypertrophy -discussed with cardiology.  Most likely finding was prominent papillary muscle.  No further workup needed  -CT scan shows hypoenhancement 2.5 cm in the subendocardial region of the left lateral ventricular wall which could be acute or chronic infarct or hypoperfusion  - I discussed with

## 2025-08-04 ENCOUNTER — APPOINTMENT (OUTPATIENT)
Dept: GENERAL RADIOLOGY | Age: 89
DRG: 871 | End: 2025-08-04
Payer: COMMERCIAL

## 2025-08-04 ENCOUNTER — APPOINTMENT (OUTPATIENT)
Dept: CT IMAGING | Age: 89
DRG: 871 | End: 2025-08-04
Payer: COMMERCIAL

## 2025-08-04 ENCOUNTER — HOSPITAL ENCOUNTER (INPATIENT)
Age: 89
LOS: 8 days | Discharge: NURSING, REHAB OR LONG-TERM CARE | DRG: 871 | End: 2025-08-12
Attending: EMERGENCY MEDICINE | Admitting: INTERNAL MEDICINE
Payer: COMMERCIAL

## 2025-08-04 DIAGNOSIS — K92.1 MELENA: ICD-10-CM

## 2025-08-04 DIAGNOSIS — K75.9 HEPATITIS: ICD-10-CM

## 2025-08-04 DIAGNOSIS — D69.6 THROMBOCYTOPENIA: ICD-10-CM

## 2025-08-04 DIAGNOSIS — Z99.2 END-STAGE RENAL DISEASE ON HEMODIALYSIS (HCC): ICD-10-CM

## 2025-08-04 DIAGNOSIS — G93.40 ACUTE ENCEPHALOPATHY: ICD-10-CM

## 2025-08-04 DIAGNOSIS — Z99.2 ESRD (END STAGE RENAL DISEASE) ON DIALYSIS (HCC): ICD-10-CM

## 2025-08-04 DIAGNOSIS — R41.82 ALTERED MENTAL STATUS, UNSPECIFIED ALTERED MENTAL STATUS TYPE: Primary | ICD-10-CM

## 2025-08-04 DIAGNOSIS — N18.6 ESRD (END STAGE RENAL DISEASE) ON DIALYSIS (HCC): ICD-10-CM

## 2025-08-04 DIAGNOSIS — E87.5 HYPERKALEMIA: ICD-10-CM

## 2025-08-04 DIAGNOSIS — R74.8 ABNORMAL LIVER ENZYMES: ICD-10-CM

## 2025-08-04 DIAGNOSIS — G89.4 PAIN SYNDROME, CHRONIC: ICD-10-CM

## 2025-08-04 DIAGNOSIS — R74.01 TRANSAMINITIS: ICD-10-CM

## 2025-08-04 DIAGNOSIS — N18.6 END-STAGE RENAL DISEASE ON HEMODIALYSIS (HCC): ICD-10-CM

## 2025-08-04 PROBLEM — I50.32 HYPERTENSIVE HEART AND KIDNEY DISEASE WITH CHRONIC DIASTOLIC CONGESTIVE HEART FAILURE AND STAGE 4 CHRONIC KIDNEY DISEASE (HCC): Chronic | Status: ACTIVE | Noted: 2022-01-23

## 2025-08-04 PROBLEM — K63.5 POLYP OF COLON: Status: ACTIVE | Noted: 2018-02-13

## 2025-08-04 PROBLEM — E78.2 MIXED HYPERLIPIDEMIA: Chronic | Status: ACTIVE | Noted: 2022-01-23

## 2025-08-04 PROBLEM — I95.1 ORTHOSTATIC HYPOTENSION: Status: ACTIVE | Noted: 2024-02-09

## 2025-08-04 PROBLEM — S72.409A FRACTURE OF DISTAL FEMUR (HCC): Status: ACTIVE | Noted: 2022-03-07

## 2025-08-04 PROBLEM — H91.90 HOH (HARD OF HEARING): Status: ACTIVE | Noted: 2024-01-30

## 2025-08-04 PROBLEM — M51.34 DEGENERATION OF THORACIC INTERVERTEBRAL DISC: Status: ACTIVE | Noted: 2018-02-13

## 2025-08-04 PROBLEM — S32.000S LUMBAR COMPRESSION FRACTURE, SEQUELA: Status: ACTIVE | Noted: 2024-08-29

## 2025-08-04 PROBLEM — M12.9 ARTHROPATHY: Status: ACTIVE | Noted: 2018-02-13

## 2025-08-04 PROBLEM — E66.9 OBESITY: Status: ACTIVE | Noted: 2018-02-13

## 2025-08-04 PROBLEM — N18.4 HYPERTENSIVE HEART AND KIDNEY DISEASE WITH CHRONIC DIASTOLIC CONGESTIVE HEART FAILURE AND STAGE 4 CHRONIC KIDNEY DISEASE (HCC): Chronic | Status: ACTIVE | Noted: 2022-01-23

## 2025-08-04 PROBLEM — I10 PRIMARY HYPERTENSION: Status: ACTIVE | Noted: 2018-02-13

## 2025-08-04 PROBLEM — I24.89 DEMAND ISCHEMIA (HCC): Status: ACTIVE | Noted: 2022-01-21

## 2025-08-04 PROBLEM — E87.29 METABOLIC ACIDOSIS, INCREASED ANION GAP (IAG): Status: ACTIVE | Noted: 2024-02-02

## 2025-08-04 PROBLEM — N25.81 SECONDARY HYPERPARATHYROIDISM OF RENAL ORIGIN: Status: ACTIVE | Noted: 2022-06-09

## 2025-08-04 PROBLEM — I49.5 SINOATRIAL NODE DYSFUNCTION (HCC): Chronic | Status: ACTIVE | Noted: 2024-01-30

## 2025-08-04 PROBLEM — I42.9 CARDIOMYOPATHY (HCC): Status: ACTIVE | Noted: 2019-05-09

## 2025-08-04 PROBLEM — F33.41 RECURRENT MAJOR DEPRESSIVE DISORDER, IN PARTIAL REMISSION: Chronic | Status: ACTIVE | Noted: 2025-05-31

## 2025-08-04 PROBLEM — F32.9 MAJOR DEPRESSION, SINGLE EPISODE: Status: ACTIVE | Noted: 2018-02-13

## 2025-08-04 PROBLEM — R53.1 ASTHENIA: Status: ACTIVE | Noted: 2018-02-13

## 2025-08-04 PROBLEM — I07.1 TRICUSPID REGURGITATION: Status: ACTIVE | Noted: 2024-01-30

## 2025-08-04 PROBLEM — E78.00 PURE HYPERCHOLESTEROLEMIA: Status: ACTIVE | Noted: 2023-10-24

## 2025-08-04 PROBLEM — I13.0 HYPERTENSIVE HEART AND KIDNEY DISEASE WITH CHRONIC DIASTOLIC CONGESTIVE HEART FAILURE AND STAGE 4 CHRONIC KIDNEY DISEASE (HCC): Chronic | Status: ACTIVE | Noted: 2022-01-23

## 2025-08-04 PROBLEM — I95.81 POSTPROCEDURAL HYPOTENSION: Status: ACTIVE | Noted: 2025-08-04

## 2025-08-04 PROBLEM — K22.89 PRESBYESOPHAGUS: Status: ACTIVE | Noted: 2024-02-13

## 2025-08-04 PROBLEM — K20.90 ESOPHAGITIS: Status: ACTIVE | Noted: 2024-02-13

## 2025-08-04 PROBLEM — S32.029A L2 VERTEBRAL FRACTURE (HCC): Status: ACTIVE | Noted: 2025-04-23

## 2025-08-04 PROBLEM — R25.1 TREMOR OF BOTH HANDS: Status: ACTIVE | Noted: 2024-02-12

## 2025-08-04 PROBLEM — Z79.899 POLYPHARMACY: Status: ACTIVE | Noted: 2023-11-21

## 2025-08-04 LAB
ALBUMIN SERPL-MCNC: 3.9 G/DL (ref 3.4–5)
ALBUMIN SERPL-MCNC: 4 G/DL (ref 3.4–5)
ALP SERPL-CCNC: 142 U/L (ref 40–129)
ALT SERPL-CCNC: 1955 U/L (ref 10–40)
AMORPH SED URNS QL MICRO: ABNORMAL /HPF
ANION GAP SERPL CALCULATED.3IONS-SCNC: 17 MMOL/L (ref 3–16)
ANION GAP SERPL CALCULATED.3IONS-SCNC: 28 MMOL/L (ref 3–16)
APAP SERPL-MCNC: 19 UG/ML (ref 10–30)
APAP SERPL-MCNC: 6 UG/ML (ref 10–30)
AST SERPL-CCNC: 1518 U/L (ref 15–37)
BACTERIA URNS QL MICRO: ABNORMAL /HPF
BASE EXCESS BLDV CALC-SCNC: -12.8 MMOL/L (ref -2–3)
BASOPHILS # BLD: 0 K/UL (ref 0–0.2)
BASOPHILS NFR BLD: 0.1 %
BILIRUB DIRECT SERPL-MCNC: 0.3 MG/DL (ref 0–0.3)
BILIRUB INDIRECT SERPL-MCNC: 0.2 MG/DL (ref 0–1)
BILIRUB SERPL-MCNC: 0.5 MG/DL (ref 0–1)
BILIRUB UR QL STRIP.AUTO: ABNORMAL
BUN SERPL-MCNC: 18 MG/DL (ref 7–20)
BUN SERPL-MCNC: 63 MG/DL (ref 7–20)
CALCIUM SERPL-MCNC: 10 MG/DL (ref 8.3–10.6)
CALCIUM SERPL-MCNC: 11 MG/DL (ref 8.3–10.6)
CHLORIDE SERPL-SCNC: 87 MMOL/L (ref 99–110)
CHLORIDE SERPL-SCNC: 96 MMOL/L (ref 99–110)
CHP ED QC CHECK: YES
CHP ED QC CHECK: YES
CLARITY UR: CLEAR
CO2 BLDV-SCNC: 16 MMOL/L
CO2 SERPL-SCNC: 11 MMOL/L (ref 21–32)
CO2 SERPL-SCNC: 23 MMOL/L (ref 21–32)
COHGB MFR BLDV: 0.9 % (ref 0–1.5)
COLOR UR: YELLOW
CREAT SERPL-MCNC: 2.8 MG/DL (ref 0.6–1.2)
CREAT SERPL-MCNC: 6.9 MG/DL (ref 0.6–1.2)
DEPRECATED RDW RBC AUTO: 16.6 % (ref 12.4–15.4)
DO-HGB MFR BLDV: 41.8 %
EKG ATRIAL RATE: 67 BPM
EKG DIAGNOSIS: NORMAL
EKG P AXIS: 48 DEGREES
EKG P-R INTERVAL: 150 MS
EKG Q-T INTERVAL: 478 MS
EKG QRS DURATION: 158 MS
EKG QTC CALCULATION (BAZETT): 505 MS
EKG R AXIS: -77 DEGREES
EKG T AXIS: 103 DEGREES
EKG VENTRICULAR RATE: 67 BPM
EOSINOPHIL # BLD: 0 K/UL (ref 0–0.6)
EOSINOPHIL NFR BLD: 0 %
EPI CELLS #/AREA URNS HPF: ABNORMAL /HPF (ref 0–5)
FERRITIN SERPL IA-MCNC: ABNORMAL NG/ML (ref 15–150)
FLUAV RNA RESP QL NAA+PROBE: NOT DETECTED
FLUBV RNA RESP QL NAA+PROBE: NOT DETECTED
GFR SERPLBLD CREATININE-BSD FMLA CKD-EPI: 16 ML/MIN/{1.73_M2}
GFR SERPLBLD CREATININE-BSD FMLA CKD-EPI: 5 ML/MIN/{1.73_M2}
GGT SERPL-CCNC: 81 U/L (ref 5–36)
GLUCOSE BLD-MCNC: 143 MG/DL
GLUCOSE BLD-MCNC: 143 MG/DL (ref 70–99)
GLUCOSE BLD-MCNC: 159 MG/DL
GLUCOSE BLD-MCNC: 159 MG/DL (ref 70–99)
GLUCOSE BLD-MCNC: 62 MG/DL (ref 70–99)
GLUCOSE BLD-MCNC: 78 MG/DL (ref 70–99)
GLUCOSE BLD-MCNC: 81 MG/DL (ref 70–99)
GLUCOSE BLD-MCNC: 92 MG/DL (ref 70–99)
GLUCOSE SERPL-MCNC: 74 MG/DL (ref 70–99)
GLUCOSE SERPL-MCNC: 87 MG/DL (ref 70–99)
GLUCOSE UR STRIP.AUTO-MCNC: NEGATIVE MG/DL
HCO3 BLDV-SCNC: 14.8 MMOL/L (ref 24–28)
HCT VFR BLD AUTO: 44.7 % (ref 36–48)
HGB BLD-MCNC: 14.8 G/DL (ref 12–16)
HGB UR QL STRIP.AUTO: ABNORMAL
INR PPP: 2.32 (ref 0.86–1.14)
KETONES UR STRIP.AUTO-MCNC: ABNORMAL MG/DL
LACTATE BLDV-SCNC: 1.7 MMOL/L (ref 0.4–2)
LDH SERPL L TO P-CCNC: 1680 U/L (ref 100–190)
LEUKOCYTE ESTERASE UR QL STRIP.AUTO: ABNORMAL
LIPASE SERPL-CCNC: 75 U/L (ref 13–60)
LYMPHOCYTES # BLD: 1.2 K/UL (ref 1–5.1)
LYMPHOCYTES NFR BLD: 7.4 %
MAGNESIUM SERPL-MCNC: 2.45 MG/DL (ref 1.8–2.4)
MCH RBC QN AUTO: 31.1 PG (ref 26–34)
MCHC RBC AUTO-ENTMCNC: 33.1 G/DL (ref 31–36)
MCV RBC AUTO: 93.9 FL (ref 80–100)
METHGB MFR BLDV: 0.2 % (ref 0–1.5)
MONOCYTES # BLD: 0.7 K/UL (ref 0–1.3)
MONOCYTES NFR BLD: 4.4 %
NEUTROPHILS # BLD: 14.7 K/UL (ref 1.7–7.7)
NEUTROPHILS NFR BLD: 88.1 %
NITRITE UR QL STRIP.AUTO: POSITIVE
NT-PROBNP SERPL-MCNC: ABNORMAL PG/ML (ref 0–449)
PCO2 BLDV: 38.9 MMHG (ref 41–51)
PERFORMED ON: ABNORMAL
PERFORMED ON: NORMAL
PH BLDV: 7.19 [PH] (ref 7.35–7.45)
PH UR STRIP.AUTO: 6.5 [PH] (ref 5–8)
PHOSPHATE SERPL-MCNC: 3.2 MG/DL (ref 2.5–4.9)
PLATELET # BLD AUTO: 102 K/UL (ref 135–450)
PMV BLD AUTO: 10.9 FL (ref 5–10.5)
PO2 BLDV: 35.5 MMHG (ref 25–40)
POTASSIUM SERPL-SCNC: 3.6 MMOL/L (ref 3.5–5.1)
POTASSIUM SERPL-SCNC: 7.6 MMOL/L (ref 3.5–5.1)
PROT SERPL-MCNC: 6.9 G/DL (ref 6.4–8.2)
PROT UR STRIP.AUTO-MCNC: >=300 MG/DL
PROTHROMBIN TIME: 25.2 SEC (ref 12.1–14.9)
RBC # BLD AUTO: 4.76 M/UL (ref 4–5.2)
RBC #/AREA URNS HPF: ABNORMAL /HPF (ref 0–4)
SALICYLATES SERPL-MCNC: <0.5 MG/DL (ref 15–30)
SAO2 % BLDV: 58 %
SARS-COV-2 RNA RESP QL NAA+PROBE: NOT DETECTED
SODIUM SERPL-SCNC: 126 MMOL/L (ref 136–145)
SODIUM SERPL-SCNC: 136 MMOL/L (ref 136–145)
SP GR UR STRIP.AUTO: 1.02 (ref 1–1.03)
TROPONIN, HIGH SENSITIVITY: 155 NG/L (ref 0–14)
TROPONIN, HIGH SENSITIVITY: 164 NG/L (ref 0–14)
UA DIPSTICK W REFLEX MICRO PNL UR: YES
URN SPEC COLLECT METH UR: ABNORMAL
UROBILINOGEN UR STRIP-ACNC: 1 E.U./DL
WBC # BLD AUTO: 16.7 K/UL (ref 4–11)
WBC #/AREA URNS HPF: ABNORMAL /HPF (ref 0–5)

## 2025-08-04 PROCEDURE — 80143 DRUG ASSAY ACETAMINOPHEN: CPT

## 2025-08-04 PROCEDURE — 71045 X-RAY EXAM CHEST 1 VIEW: CPT

## 2025-08-04 PROCEDURE — 83735 ASSAY OF MAGNESIUM: CPT

## 2025-08-04 PROCEDURE — 80069 RENAL FUNCTION PANEL: CPT

## 2025-08-04 PROCEDURE — 2580000003 HC RX 258: Performed by: EMERGENCY MEDICINE

## 2025-08-04 PROCEDURE — 83690 ASSAY OF LIPASE: CPT

## 2025-08-04 PROCEDURE — 6360000002 HC RX W HCPCS

## 2025-08-04 PROCEDURE — 87636 SARSCOV2 & INF A&B AMP PRB: CPT

## 2025-08-04 PROCEDURE — 36415 COLL VENOUS BLD VENIPUNCTURE: CPT

## 2025-08-04 PROCEDURE — 86708 HEPATITIS A ANTIBODY: CPT

## 2025-08-04 PROCEDURE — 93005 ELECTROCARDIOGRAM TRACING: CPT | Performed by: EMERGENCY MEDICINE

## 2025-08-04 PROCEDURE — 6360000002 HC RX W HCPCS: Performed by: EMERGENCY MEDICINE

## 2025-08-04 PROCEDURE — 87040 BLOOD CULTURE FOR BACTERIA: CPT

## 2025-08-04 PROCEDURE — 80074 ACUTE HEPATITIS PANEL: CPT

## 2025-08-04 PROCEDURE — 87186 SC STD MICRODIL/AGAR DIL: CPT

## 2025-08-04 PROCEDURE — 85610 PROTHROMBIN TIME: CPT

## 2025-08-04 PROCEDURE — 87086 URINE CULTURE/COLONY COUNT: CPT

## 2025-08-04 PROCEDURE — 86704 HEP B CORE ANTIBODY TOTAL: CPT

## 2025-08-04 PROCEDURE — 83540 ASSAY OF IRON: CPT

## 2025-08-04 PROCEDURE — 82977 ASSAY OF GGT: CPT

## 2025-08-04 PROCEDURE — 6370000000 HC RX 637 (ALT 250 FOR IP): Performed by: EMERGENCY MEDICINE

## 2025-08-04 PROCEDURE — 83605 ASSAY OF LACTIC ACID: CPT

## 2025-08-04 PROCEDURE — 2580000003 HC RX 258

## 2025-08-04 PROCEDURE — 86706 HEP B SURFACE ANTIBODY: CPT

## 2025-08-04 PROCEDURE — 90935 HEMODIALYSIS ONE EVALUATION: CPT

## 2025-08-04 PROCEDURE — 82390 ASSAY OF CERULOPLASMIN: CPT

## 2025-08-04 PROCEDURE — 70450 CT HEAD/BRAIN W/O DYE: CPT

## 2025-08-04 PROCEDURE — 82803 BLOOD GASES ANY COMBINATION: CPT

## 2025-08-04 PROCEDURE — 86015 ACTIN ANTIBODY EACH: CPT

## 2025-08-04 PROCEDURE — 96374 THER/PROPH/DIAG INJ IV PUSH: CPT

## 2025-08-04 PROCEDURE — 82728 ASSAY OF FERRITIN: CPT

## 2025-08-04 PROCEDURE — 2500000003 HC RX 250 WO HCPCS: Performed by: EMERGENCY MEDICINE

## 2025-08-04 PROCEDURE — 99285 EMERGENCY DEPT VISIT HI MDM: CPT

## 2025-08-04 PROCEDURE — 80076 HEPATIC FUNCTION PANEL: CPT

## 2025-08-04 PROCEDURE — 2500000003 HC RX 250 WO HCPCS

## 2025-08-04 PROCEDURE — 83880 ASSAY OF NATRIURETIC PEPTIDE: CPT

## 2025-08-04 PROCEDURE — 83550 IRON BINDING TEST: CPT

## 2025-08-04 PROCEDURE — 96375 TX/PRO/DX INJ NEW DRUG ADDON: CPT

## 2025-08-04 PROCEDURE — 87088 URINE BACTERIA CULTURE: CPT

## 2025-08-04 PROCEDURE — 81001 URINALYSIS AUTO W/SCOPE: CPT

## 2025-08-04 PROCEDURE — 85025 COMPLETE CBC W/AUTO DIFF WBC: CPT

## 2025-08-04 PROCEDURE — 86038 ANTINUCLEAR ANTIBODIES: CPT

## 2025-08-04 PROCEDURE — 80179 DRUG ASSAY SALICYLATE: CPT

## 2025-08-04 PROCEDURE — 5A1D70Z PERFORMANCE OF URINARY FILTRATION, INTERMITTENT, LESS THAN 6 HOURS PER DAY: ICD-10-PCS | Performed by: STUDENT IN AN ORGANIZED HEALTH CARE EDUCATION/TRAINING PROGRAM

## 2025-08-04 PROCEDURE — 82784 ASSAY IGA/IGD/IGG/IGM EACH: CPT

## 2025-08-04 PROCEDURE — 2060000000 HC ICU INTERMEDIATE R&B

## 2025-08-04 PROCEDURE — 84484 ASSAY OF TROPONIN QUANT: CPT

## 2025-08-04 PROCEDURE — P9047 ALBUMIN (HUMAN), 25%, 50ML: HCPCS

## 2025-08-04 PROCEDURE — 83615 LACTATE (LD) (LDH) ENZYME: CPT

## 2025-08-04 RX ORDER — GLUCAGON 1 MG/ML
1 KIT INJECTION PRN
Status: DISCONTINUED | OUTPATIENT
Start: 2025-08-04 | End: 2025-08-12 | Stop reason: HOSPADM

## 2025-08-04 RX ORDER — ONDANSETRON 4 MG/1
4 TABLET, ORALLY DISINTEGRATING ORAL EVERY 8 HOURS PRN
Status: DISCONTINUED | OUTPATIENT
Start: 2025-08-04 | End: 2025-08-09

## 2025-08-04 RX ORDER — ALBUMIN (HUMAN) 12.5 G/50ML
SOLUTION INTRAVENOUS
Status: COMPLETED
Start: 2025-08-04 | End: 2025-08-04

## 2025-08-04 RX ORDER — POLYETHYLENE GLYCOL 3350 17 G/17G
17 POWDER, FOR SOLUTION ORAL DAILY PRN
Status: DISCONTINUED | OUTPATIENT
Start: 2025-08-04 | End: 2025-08-12 | Stop reason: HOSPADM

## 2025-08-04 RX ORDER — DEXTROSE MONOHYDRATE 100 MG/ML
INJECTION, SOLUTION INTRAVENOUS CONTINUOUS PRN
Status: DISCONTINUED | OUTPATIENT
Start: 2025-08-04 | End: 2025-08-12 | Stop reason: HOSPADM

## 2025-08-04 RX ORDER — INDOMETHACIN 25 MG/1
50 CAPSULE ORAL ONCE
Status: COMPLETED | OUTPATIENT
Start: 2025-08-04 | End: 2025-08-04

## 2025-08-04 RX ORDER — CALCIUM GLUCONATE 20 MG/ML
1000 INJECTION, SOLUTION INTRAVENOUS ONCE
Status: COMPLETED | OUTPATIENT
Start: 2025-08-04 | End: 2025-08-04

## 2025-08-04 RX ORDER — MIDODRINE HYDROCHLORIDE 5 MG/1
5 TABLET ORAL
Status: DISCONTINUED | OUTPATIENT
Start: 2025-08-04 | End: 2025-08-12 | Stop reason: HOSPADM

## 2025-08-04 RX ORDER — LATANOPROST 50 UG/ML
1 SOLUTION/ DROPS OPHTHALMIC NIGHTLY
COMMUNITY

## 2025-08-04 RX ORDER — SODIUM CHLORIDE 0.9 % (FLUSH) 0.9 %
5-40 SYRINGE (ML) INJECTION EVERY 12 HOURS SCHEDULED
Status: DISCONTINUED | OUTPATIENT
Start: 2025-08-04 | End: 2025-08-12 | Stop reason: HOSPADM

## 2025-08-04 RX ORDER — SODIUM CHLORIDE 0.9 % (FLUSH) 0.9 %
5-40 SYRINGE (ML) INJECTION PRN
Status: DISCONTINUED | OUTPATIENT
Start: 2025-08-04 | End: 2025-08-12 | Stop reason: HOSPADM

## 2025-08-04 RX ORDER — LOPERAMIDE HYDROCHLORIDE 2 MG/1
TABLET ORAL
Status: ON HOLD | COMMUNITY
End: 2025-08-12 | Stop reason: HOSPADM

## 2025-08-04 RX ORDER — ACETAMINOPHEN 650 MG/1
650 SUPPOSITORY RECTAL EVERY 6 HOURS PRN
Status: DISCONTINUED | OUTPATIENT
Start: 2025-08-04 | End: 2025-08-05

## 2025-08-04 RX ORDER — SODIUM CHLORIDE 9 MG/ML
INJECTION, SOLUTION INTRAVENOUS PRN
Status: DISCONTINUED | OUTPATIENT
Start: 2025-08-04 | End: 2025-08-12 | Stop reason: HOSPADM

## 2025-08-04 RX ORDER — ONDANSETRON 2 MG/ML
4 INJECTION INTRAMUSCULAR; INTRAVENOUS EVERY 6 HOURS PRN
Status: DISCONTINUED | OUTPATIENT
Start: 2025-08-04 | End: 2025-08-09

## 2025-08-04 RX ORDER — ACETAMINOPHEN 325 MG/1
650 TABLET ORAL EVERY 6 HOURS PRN
Status: DISCONTINUED | OUTPATIENT
Start: 2025-08-04 | End: 2025-08-05

## 2025-08-04 RX ORDER — DONEPEZIL HYDROCHLORIDE 5 MG/1
5 TABLET, FILM COATED ORAL DAILY
COMMUNITY

## 2025-08-04 RX ORDER — OXYCODONE HYDROCHLORIDE 5 MG/1
5 TABLET ORAL EVERY 6 HOURS PRN
Status: ON HOLD | COMMUNITY
End: 2025-08-12

## 2025-08-04 RX ADMIN — SODIUM BICARBONATE 50 MEQ: 84 INJECTION, SOLUTION INTRAVENOUS at 12:07

## 2025-08-04 RX ADMIN — WATER 2000 MG: 1 INJECTION INTRAMUSCULAR; INTRAVENOUS; SUBCUTANEOUS at 12:47

## 2025-08-04 RX ADMIN — SODIUM ZIRCONIUM CYCLOSILICATE 10 G: 5 POWDER, FOR SUSPENSION ORAL at 12:01

## 2025-08-04 RX ADMIN — SODIUM CHLORIDE, PRESERVATIVE FREE 10 ML: 5 INJECTION INTRAVENOUS at 21:47

## 2025-08-04 RX ADMIN — DEXTROSE MONOHYDRATE 125 ML: 100 INJECTION, SOLUTION INTRAVENOUS at 19:38

## 2025-08-04 RX ADMIN — ALBUMIN (HUMAN) 25 G: 0.25 INJECTION, SOLUTION INTRAVENOUS at 15:05

## 2025-08-04 RX ADMIN — DEXTROSE MONOHYDRATE 250 ML: 100 INJECTION, SOLUTION INTRAVENOUS at 12:04

## 2025-08-04 RX ADMIN — VANCOMYCIN HYDROCHLORIDE 1750 MG: 10 INJECTION, POWDER, LYOPHILIZED, FOR SOLUTION INTRAVENOUS at 19:04

## 2025-08-04 RX ADMIN — MEROPENEM 1000 MG: 1 INJECTION INTRAVENOUS at 18:57

## 2025-08-04 RX ADMIN — CALCIUM GLUCONATE 1000 MG: 20 INJECTION, SOLUTION INTRAVENOUS at 11:54

## 2025-08-04 RX ADMIN — INSULIN HUMAN 10 UNITS: 100 INJECTION, SOLUTION PARENTERAL at 12:06

## 2025-08-04 ASSESSMENT — PAIN SCALES - PAIN ASSESSMENT IN ADVANCED DEMENTIA (PAINAD)
BODYLANGUAGE: RELAXED
BREATHING: NORMAL
TOTALSCORE: 0
FACIALEXPRESSION: SMILING OR INEXPRESSIVE
CONSOLABILITY: NO NEED TO CONSOLE

## 2025-08-05 ENCOUNTER — APPOINTMENT (OUTPATIENT)
Dept: ULTRASOUND IMAGING | Age: 89
DRG: 871 | End: 2025-08-05
Payer: COMMERCIAL

## 2025-08-05 LAB
ALBUMIN SERPL-MCNC: 3.6 G/DL (ref 3.4–5)
ALBUMIN SERPL-MCNC: 3.7 G/DL (ref 3.4–5)
ALP SERPL-CCNC: 111 U/L (ref 40–129)
ALT SERPL-CCNC: 2692 U/L (ref 10–40)
ANA SER QL IA: NEGATIVE
ANION GAP SERPL CALCULATED.3IONS-SCNC: 17 MMOL/L (ref 3–16)
ANION GAP SERPL CALCULATED.3IONS-SCNC: 21 MMOL/L (ref 3–16)
ANISOCYTOSIS BLD QL SMEAR: ABNORMAL
AST SERPL-CCNC: 1943 U/L (ref 15–37)
BACTERIA UR CULT: ABNORMAL
BASOPHILS # BLD: 0 K/UL (ref 0–0.2)
BASOPHILS NFR BLD: 0.1 %
BETA-HYDROXYBUTYRATE: 0.72 MMOL/L (ref 0–0.27)
BILIRUB DIRECT SERPL-MCNC: 0.3 MG/DL (ref 0–0.3)
BILIRUB INDIRECT SERPL-MCNC: 0.2 MG/DL (ref 0–1)
BILIRUB SERPL-MCNC: 0.5 MG/DL (ref 0–1)
BUN SERPL-MCNC: 27 MG/DL (ref 7–20)
BUN SERPL-MCNC: 29 MG/DL (ref 7–20)
BURR CELLS BLD QL SMEAR: ABNORMAL
CALCIUM SERPL-MCNC: 10.2 MG/DL (ref 8.3–10.6)
CALCIUM SERPL-MCNC: 10.3 MG/DL (ref 8.3–10.6)
CHLORIDE SERPL-SCNC: 98 MMOL/L (ref 99–110)
CHLORIDE SERPL-SCNC: 99 MMOL/L (ref 99–110)
CK SERPL-CCNC: 65 U/L (ref 26–192)
CO2 SERPL-SCNC: 17 MMOL/L (ref 21–32)
CO2 SERPL-SCNC: 21 MMOL/L (ref 21–32)
CREAT SERPL-MCNC: 3.6 MG/DL (ref 0.6–1.2)
CREAT SERPL-MCNC: 3.8 MG/DL (ref 0.6–1.2)
DEPRECATED RDW RBC AUTO: 16.5 % (ref 12.4–15.4)
EOSINOPHIL # BLD: 0 K/UL (ref 0–0.6)
EOSINOPHIL NFR BLD: 0 %
GFR SERPLBLD CREATININE-BSD FMLA CKD-EPI: 11 ML/MIN/{1.73_M2}
GFR SERPLBLD CREATININE-BSD FMLA CKD-EPI: 12 ML/MIN/{1.73_M2}
GLUCOSE BLD-MCNC: 137 MG/DL (ref 70–99)
GLUCOSE BLD-MCNC: 71 MG/DL (ref 70–99)
GLUCOSE BLD-MCNC: 71 MG/DL (ref 70–99)
GLUCOSE BLD-MCNC: 80 MG/DL (ref 70–99)
GLUCOSE SERPL-MCNC: 83 MG/DL (ref 70–99)
GLUCOSE SERPL-MCNC: 86 MG/DL (ref 70–99)
HAV IGM SERPL QL IA: NORMAL
HBV CORE IGM SERPL QL IA: NORMAL
HBV SURFACE AB SERPL IA-ACNC: <3.5 MIU/ML
HBV SURFACE AB SERPL IA-ACNC: <3.5 MIU/ML
HBV SURFACE AG SERPL QL IA: NORMAL
HBV SURFACE AG SERPL QL IA: NORMAL
HCT VFR BLD AUTO: 37.9 % (ref 36–48)
HCV AB SERPL QL IA: NORMAL
HETEROPH AB BLD QL IA: NEGATIVE
HGB BLD-MCNC: 12.6 G/DL (ref 12–16)
IGA SERPL-MCNC: 333 MG/DL (ref 70–400)
IGG SERPL-MCNC: 885 MG/DL (ref 700–1600)
IGM SERPL-MCNC: 60 MG/DL (ref 40–230)
INR PPP: 2.56 (ref 0.86–1.14)
IRON SATN MFR SERPL: 78 % (ref 15–50)
IRON SERPL-MCNC: 119 UG/DL (ref 37–145)
LYMPHOCYTES # BLD: 0.6 K/UL (ref 1–5.1)
LYMPHOCYTES NFR BLD: 5.1 %
MCH RBC QN AUTO: 31.2 PG (ref 26–34)
MCHC RBC AUTO-ENTMCNC: 33.4 G/DL (ref 31–36)
MCV RBC AUTO: 93.4 FL (ref 80–100)
MONOCYTES # BLD: 0.4 K/UL (ref 0–1.3)
MONOCYTES NFR BLD: 3.9 %
NEUTROPHILS # BLD: 10.5 K/UL (ref 1.7–7.7)
NEUTROPHILS NFR BLD: 90.9 %
ORGANISM: ABNORMAL
PERFORMED ON: ABNORMAL
PERFORMED ON: NORMAL
PHOSPHATE SERPL-MCNC: 4 MG/DL (ref 2.5–4.9)
PLATELET # BLD AUTO: 70 K/UL (ref 135–450)
PLATELET BLD QL SMEAR: ABNORMAL
PMV BLD AUTO: 10.1 FL (ref 5–10.5)
POTASSIUM SERPL-SCNC: 3.8 MMOL/L (ref 3.5–5.1)
POTASSIUM SERPL-SCNC: 3.9 MMOL/L (ref 3.5–5.1)
PROT SERPL-MCNC: 5.7 G/DL (ref 6.4–8.2)
PROTHROMBIN TIME: 27.1 SEC (ref 12.1–14.9)
RBC # BLD AUTO: 4.06 M/UL (ref 4–5.2)
SODIUM SERPL-SCNC: 136 MMOL/L (ref 136–145)
SODIUM SERPL-SCNC: 137 MMOL/L (ref 136–145)
TIBC SERPL-MCNC: 152 UG/DL (ref 260–445)
VANCOMYCIN SERPL-MCNC: 19.2 UG/ML
WBC # BLD AUTO: 11.6 K/UL (ref 4–11)

## 2025-08-05 PROCEDURE — 80076 HEPATIC FUNCTION PANEL: CPT

## 2025-08-05 PROCEDURE — 86308 HETEROPHILE ANTIBODY SCREEN: CPT

## 2025-08-05 PROCEDURE — 6370000000 HC RX 637 (ALT 250 FOR IP)

## 2025-08-05 PROCEDURE — 2580000003 HC RX 258: Performed by: INTERNAL MEDICINE

## 2025-08-05 PROCEDURE — 93975 VASCULAR STUDY: CPT

## 2025-08-05 PROCEDURE — 76705 ECHO EXAM OF ABDOMEN: CPT

## 2025-08-05 PROCEDURE — 2060000000 HC ICU INTERMEDIATE R&B

## 2025-08-05 PROCEDURE — 85025 COMPLETE CBC W/AUTO DIFF WBC: CPT

## 2025-08-05 PROCEDURE — 80202 ASSAY OF VANCOMYCIN: CPT

## 2025-08-05 PROCEDURE — 85610 PROTHROMBIN TIME: CPT

## 2025-08-05 PROCEDURE — 36415 COLL VENOUS BLD VENIPUNCTURE: CPT

## 2025-08-05 PROCEDURE — 82607 VITAMIN B-12: CPT

## 2025-08-05 PROCEDURE — 82010 KETONE BODYS QUAN: CPT

## 2025-08-05 PROCEDURE — 2580000003 HC RX 258

## 2025-08-05 PROCEDURE — 80069 RENAL FUNCTION PANEL: CPT

## 2025-08-05 PROCEDURE — 6360000002 HC RX W HCPCS: Performed by: INTERNAL MEDICINE

## 2025-08-05 PROCEDURE — 6360000002 HC RX W HCPCS

## 2025-08-05 PROCEDURE — 82550 ASSAY OF CK (CPK): CPT

## 2025-08-05 PROCEDURE — 2500000003 HC RX 250 WO HCPCS

## 2025-08-05 PROCEDURE — 82746 ASSAY OF FOLIC ACID SERUM: CPT

## 2025-08-05 RX ORDER — DONEPEZIL HYDROCHLORIDE 5 MG/1
5 TABLET, FILM COATED ORAL NIGHTLY
Status: DISCONTINUED | OUTPATIENT
Start: 2025-08-05 | End: 2025-08-12 | Stop reason: HOSPADM

## 2025-08-05 RX ORDER — OXYCODONE HYDROCHLORIDE 5 MG/1
5 TABLET ORAL EVERY 6 HOURS PRN
Refills: 0 | Status: DISCONTINUED | OUTPATIENT
Start: 2025-08-05 | End: 2025-08-12 | Stop reason: HOSPADM

## 2025-08-05 RX ORDER — ESCITALOPRAM OXALATE 10 MG/1
10 TABLET ORAL DAILY
Status: DISCONTINUED | OUTPATIENT
Start: 2025-08-05 | End: 2025-08-12 | Stop reason: HOSPADM

## 2025-08-05 RX ADMIN — ACETYLCYSTEINE 13600 MG: 200 INJECTION, SOLUTION INTRAVENOUS at 16:03

## 2025-08-05 RX ADMIN — DONEPEZIL HYDROCHLORIDE 5 MG: 5 TABLET, FILM COATED ORAL at 22:47

## 2025-08-05 RX ADMIN — PHYTONADIONE 10 MG: 10 INJECTION, EMULSION INTRAMUSCULAR; INTRAVENOUS; SUBCUTANEOUS at 09:42

## 2025-08-05 RX ADMIN — MEROPENEM 500 MG: 500 INJECTION INTRAVENOUS at 18:10

## 2025-08-05 RX ADMIN — SODIUM CHLORIDE, PRESERVATIVE FREE 10 ML: 5 INJECTION INTRAVENOUS at 22:48

## 2025-08-05 RX ADMIN — ESCITALOPRAM OXALATE 10 MG: 10 TABLET ORAL at 16:09

## 2025-08-05 RX ADMIN — ACETYLCYSTEINE 6800 MG: 200 INJECTION, SOLUTION INTRAVENOUS at 22:45

## 2025-08-05 ASSESSMENT — PAIN SCALES - PAIN ASSESSMENT IN ADVANCED DEMENTIA (PAINAD)
BODYLANGUAGE: RELAXED
FACIALEXPRESSION: SMILING OR INEXPRESSIVE
BODYLANGUAGE: RELAXED
CONSOLABILITY: NO NEED TO CONSOLE
CONSOLABILITY: NO NEED TO CONSOLE
TOTALSCORE: 0
BREATHING: NORMAL
BREATHING: NORMAL
FACIALEXPRESSION: SMILING OR INEXPRESSIVE
TOTALSCORE: 0

## 2025-08-06 ENCOUNTER — APPOINTMENT (OUTPATIENT)
Dept: CT IMAGING | Age: 89
DRG: 871 | End: 2025-08-06
Payer: COMMERCIAL

## 2025-08-06 LAB
ALBUMIN SERPL-MCNC: 3.1 G/DL (ref 3.4–5)
ALP SERPL-CCNC: 119 U/L (ref 40–129)
ALT SERPL-CCNC: 1836 U/L (ref 10–40)
ANION GAP SERPL CALCULATED.3IONS-SCNC: 20 MMOL/L (ref 3–16)
AST SERPL-CCNC: 666 U/L (ref 15–37)
BASOPHILS # BLD: 0 K/UL (ref 0–0.2)
BASOPHILS NFR BLD: 0.1 %
BILIRUB DIRECT SERPL-MCNC: 0.3 MG/DL (ref 0–0.3)
BILIRUB INDIRECT SERPL-MCNC: 0.3 MG/DL (ref 0–1)
BILIRUB SERPL-MCNC: 0.6 MG/DL (ref 0–1)
BUN SERPL-MCNC: 47 MG/DL (ref 7–20)
CALCIUM SERPL-MCNC: 9.8 MG/DL (ref 8.3–10.6)
CHLORIDE SERPL-SCNC: 92 MMOL/L (ref 99–110)
CO2 SERPL-SCNC: 20 MMOL/L (ref 21–32)
CREAT SERPL-MCNC: 4.8 MG/DL (ref 0.6–1.2)
DEPRECATED RDW RBC AUTO: 16.1 % (ref 12.4–15.4)
DEPRECATED RDW RBC AUTO: 16.6 % (ref 12.4–15.4)
EOSINOPHIL # BLD: 0.1 K/UL (ref 0–0.6)
EOSINOPHIL NFR BLD: 0.6 %
FOLATE SERPL-MCNC: 34.7 NG/ML (ref 4.78–24.2)
GFR SERPLBLD CREATININE-BSD FMLA CKD-EPI: 8 ML/MIN/{1.73_M2}
GLUCOSE BLD-MCNC: 115 MG/DL (ref 70–99)
GLUCOSE BLD-MCNC: 83 MG/DL (ref 70–99)
GLUCOSE BLD-MCNC: 87 MG/DL (ref 70–99)
GLUCOSE BLD-MCNC: 99 MG/DL (ref 70–99)
GLUCOSE SERPL-MCNC: 107 MG/DL (ref 70–99)
HCT VFR BLD AUTO: 34.4 % (ref 36–48)
HCT VFR BLD AUTO: 37.2 % (ref 36–48)
HGB BLD-MCNC: 11.6 G/DL (ref 12–16)
HGB BLD-MCNC: 12.4 G/DL (ref 12–16)
INR PPP: 1.92 (ref 0.86–1.14)
LYMPHOCYTES # BLD: 1.3 K/UL (ref 1–5.1)
LYMPHOCYTES NFR BLD: 14.1 %
MCH RBC QN AUTO: 31 PG (ref 26–34)
MCH RBC QN AUTO: 31.3 PG (ref 26–34)
MCHC RBC AUTO-ENTMCNC: 33.3 G/DL (ref 31–36)
MCHC RBC AUTO-ENTMCNC: 33.6 G/DL (ref 31–36)
MCV RBC AUTO: 92.3 FL (ref 80–100)
MCV RBC AUTO: 94.2 FL (ref 80–100)
MONOCYTES # BLD: 0.6 K/UL (ref 0–1.3)
MONOCYTES NFR BLD: 6 %
NEUTROPHILS # BLD: 7.3 K/UL (ref 1.7–7.7)
NEUTROPHILS NFR BLD: 79.2 %
PERFORMED ON: ABNORMAL
PERFORMED ON: NORMAL
PLATELET # BLD AUTO: 28 K/UL (ref 135–450)
PLATELET # BLD AUTO: 51 K/UL (ref 135–450)
PMV BLD AUTO: 10 FL (ref 5–10.5)
PMV BLD AUTO: 9.3 FL (ref 5–10.5)
POTASSIUM SERPL-SCNC: 3.6 MMOL/L (ref 3.5–5.1)
PROT SERPL-MCNC: 5 G/DL (ref 6.4–8.2)
PROTHROMBIN TIME: 21.9 SEC (ref 12.1–14.9)
RBC # BLD AUTO: 3.73 M/UL (ref 4–5.2)
RBC # BLD AUTO: 3.95 M/UL (ref 4–5.2)
SODIUM SERPL-SCNC: 132 MMOL/L (ref 136–145)
VANCOMYCIN SERPL-MCNC: 15.5 UG/ML
VIT B12 SERPL-MCNC: >4000 PG/ML (ref 211–911)
WBC # BLD AUTO: 10.4 K/UL (ref 4–11)
WBC # BLD AUTO: 9.2 K/UL (ref 4–11)

## 2025-08-06 PROCEDURE — 2060000000 HC ICU INTERMEDIATE R&B

## 2025-08-06 PROCEDURE — 74177 CT ABD & PELVIS W/CONTRAST: CPT

## 2025-08-06 PROCEDURE — 80076 HEPATIC FUNCTION PANEL: CPT

## 2025-08-06 PROCEDURE — 6360000002 HC RX W HCPCS

## 2025-08-06 PROCEDURE — 6360000004 HC RX CONTRAST MEDICATION

## 2025-08-06 PROCEDURE — 85027 COMPLETE CBC AUTOMATED: CPT

## 2025-08-06 PROCEDURE — 80202 ASSAY OF VANCOMYCIN: CPT

## 2025-08-06 PROCEDURE — 6360000002 HC RX W HCPCS: Performed by: INTERNAL MEDICINE

## 2025-08-06 PROCEDURE — 2580000003 HC RX 258

## 2025-08-06 PROCEDURE — 36415 COLL VENOUS BLD VENIPUNCTURE: CPT

## 2025-08-06 PROCEDURE — 6370000000 HC RX 637 (ALT 250 FOR IP)

## 2025-08-06 PROCEDURE — 2500000003 HC RX 250 WO HCPCS: Performed by: INTERNAL MEDICINE

## 2025-08-06 PROCEDURE — 2500000003 HC RX 250 WO HCPCS

## 2025-08-06 PROCEDURE — 85610 PROTHROMBIN TIME: CPT

## 2025-08-06 PROCEDURE — 80048 BASIC METABOLIC PNL TOTAL CA: CPT

## 2025-08-06 PROCEDURE — 90935 HEMODIALYSIS ONE EVALUATION: CPT

## 2025-08-06 PROCEDURE — 85025 COMPLETE CBC W/AUTO DIFF WBC: CPT

## 2025-08-06 RX ORDER — IOPAMIDOL 755 MG/ML
75 INJECTION, SOLUTION INTRAVASCULAR
Status: COMPLETED | OUTPATIENT
Start: 2025-08-06 | End: 2025-08-06

## 2025-08-06 RX ORDER — DIATRIZOATE MEGLUMINE AND DIATRIZOATE SODIUM 660; 100 MG/ML; MG/ML
30 SOLUTION ORAL; RECTAL
Status: DISCONTINUED | OUTPATIENT
Start: 2025-08-06 | End: 2025-08-12 | Stop reason: HOSPADM

## 2025-08-06 RX ADMIN — DIATRIZOATE MEGLUMINE AND DIATRIZOATE SODIUM 30 ML: 660; 100 LIQUID ORAL; RECTAL at 16:03

## 2025-08-06 RX ADMIN — ESCITALOPRAM OXALATE 10 MG: 10 TABLET ORAL at 12:18

## 2025-08-06 RX ADMIN — MIDODRINE HYDROCHLORIDE 5 MG: 5 TABLET ORAL at 17:06

## 2025-08-06 RX ADMIN — MEROPENEM 500 MG: 500 INJECTION INTRAVENOUS at 17:41

## 2025-08-06 RX ADMIN — IOPAMIDOL 75 ML: 755 INJECTION, SOLUTION INTRAVENOUS at 16:03

## 2025-08-06 RX ADMIN — OXYCODONE 5 MG: 5 TABLET ORAL at 20:15

## 2025-08-06 RX ADMIN — SODIUM CHLORIDE, PRESERVATIVE FREE 40 MG: 5 INJECTION INTRAVENOUS at 14:24

## 2025-08-06 RX ADMIN — DONEPEZIL HYDROCHLORIDE 5 MG: 5 TABLET, FILM COATED ORAL at 20:15

## 2025-08-06 RX ADMIN — MIDODRINE HYDROCHLORIDE 5 MG: 5 TABLET ORAL at 12:18

## 2025-08-06 RX ADMIN — VANCOMYCIN HYDROCHLORIDE 750 MG: 750 INJECTION, POWDER, LYOPHILIZED, FOR SOLUTION INTRAVENOUS at 13:54

## 2025-08-06 RX ADMIN — SODIUM CHLORIDE, PRESERVATIVE FREE 10 ML: 5 INJECTION INTRAVENOUS at 12:18

## 2025-08-06 ASSESSMENT — PAIN DESCRIPTION - ORIENTATION: ORIENTATION: RIGHT;LEFT;MID

## 2025-08-06 ASSESSMENT — PAIN SCALES - GENERAL
PAINLEVEL_OUTOF10: 2
PAINLEVEL_OUTOF10: 7

## 2025-08-06 ASSESSMENT — PAIN DESCRIPTION - DESCRIPTORS: DESCRIPTORS: CRAMPING;ACHING;GNAWING

## 2025-08-06 ASSESSMENT — PAIN - FUNCTIONAL ASSESSMENT: PAIN_FUNCTIONAL_ASSESSMENT: ACTIVITIES ARE NOT PREVENTED

## 2025-08-06 ASSESSMENT — PAIN DESCRIPTION - LOCATION: LOCATION: ABDOMEN;LEG

## 2025-08-07 ENCOUNTER — APPOINTMENT (OUTPATIENT)
Dept: VASCULAR LAB | Age: 89
DRG: 871 | End: 2025-08-07
Payer: COMMERCIAL

## 2025-08-07 PROBLEM — R41.82 ALTERED MENTAL STATUS: Status: ACTIVE | Noted: 2025-08-07

## 2025-08-07 LAB
ALBUMIN SERPL-MCNC: 3.2 G/DL (ref 3.4–5)
ALP SERPL-CCNC: 112 U/L (ref 40–129)
ALT SERPL-CCNC: 1144 U/L (ref 10–40)
ANION GAP SERPL CALCULATED.3IONS-SCNC: 13 MMOL/L (ref 3–16)
AST SERPL-CCNC: 244 U/L (ref 15–37)
BILIRUB DIRECT SERPL-MCNC: 0.4 MG/DL (ref 0–0.3)
BILIRUB INDIRECT SERPL-MCNC: 0.5 MG/DL (ref 0–1)
BILIRUB SERPL-MCNC: 0.9 MG/DL (ref 0–1)
BUN SERPL-MCNC: 23 MG/DL (ref 7–20)
CALCIUM SERPL-MCNC: 9.4 MG/DL (ref 8.3–10.6)
CHLORIDE SERPL-SCNC: 92 MMOL/L (ref 99–110)
CMV DNA SERPL NAA+PROBE-ACNC: NOT DETECTED IU/ML
CMV DNA SERPL NAA+PROBE-LOG IU: NOT DETECTED LOG IU/ML
CMV DNA SERPL QL NAA+PROBE: NOT DETECTED
CO2 SERPL-SCNC: 21 MMOL/L (ref 21–32)
CREAT SERPL-MCNC: 3.1 MG/DL (ref 0.6–1.2)
DEPRECATED RDW RBC AUTO: 16.1 % (ref 12.4–15.4)
ECHO BSA: 1.7 M2
GFR SERPLBLD CREATININE-BSD FMLA CKD-EPI: 14 ML/MIN/{1.73_M2}
GLUCOSE BLD-MCNC: 61 MG/DL (ref 70–99)
GLUCOSE BLD-MCNC: 72 MG/DL (ref 70–99)
GLUCOSE BLD-MCNC: 75 MG/DL (ref 70–99)
GLUCOSE BLD-MCNC: 88 MG/DL (ref 70–99)
GLUCOSE BLD-MCNC: 94 MG/DL (ref 70–99)
GLUCOSE SERPL-MCNC: 92 MG/DL (ref 70–99)
HAV AB SER QL IA: POSITIVE
HBV CORE AB SERPL QL IA: NEGATIVE
HCT VFR BLD AUTO: 33.3 % (ref 36–48)
HGB BLD-MCNC: 11.6 G/DL (ref 12–16)
MAGNESIUM SERPL-MCNC: 1.9 MG/DL (ref 1.8–2.4)
MCH RBC QN AUTO: 31.7 PG (ref 26–34)
MCHC RBC AUTO-ENTMCNC: 34.7 G/DL (ref 31–36)
MCV RBC AUTO: 91.2 FL (ref 80–100)
PERFORMED ON: ABNORMAL
PERFORMED ON: NORMAL
PHOSPHATE SERPL-MCNC: 2.6 MG/DL (ref 2.5–4.9)
PLATELET # BLD AUTO: 41 K/UL (ref 135–450)
PMV BLD AUTO: 9.8 FL (ref 5–10.5)
POTASSIUM SERPL-SCNC: 3.6 MMOL/L (ref 3.5–5.1)
PROT SERPL-MCNC: 5.1 G/DL (ref 6.4–8.2)
RBC # BLD AUTO: 3.66 M/UL (ref 4–5.2)
SMA IGG SER-ACNC: 6 UNITS (ref 0–19)
SODIUM SERPL-SCNC: 126 MMOL/L (ref 136–145)
WBC # BLD AUTO: 7.2 K/UL (ref 4–11)

## 2025-08-07 PROCEDURE — 2060000000 HC ICU INTERMEDIATE R&B

## 2025-08-07 PROCEDURE — 6370000000 HC RX 637 (ALT 250 FOR IP)

## 2025-08-07 PROCEDURE — 84100 ASSAY OF PHOSPHORUS: CPT

## 2025-08-07 PROCEDURE — 2580000003 HC RX 258

## 2025-08-07 PROCEDURE — 6360000002 HC RX W HCPCS: Performed by: INTERNAL MEDICINE

## 2025-08-07 PROCEDURE — 6360000002 HC RX W HCPCS

## 2025-08-07 PROCEDURE — 2500000003 HC RX 250 WO HCPCS: Performed by: INTERNAL MEDICINE

## 2025-08-07 PROCEDURE — 99497 ADVNCD CARE PLAN 30 MIN: CPT | Performed by: NURSE PRACTITIONER

## 2025-08-07 PROCEDURE — 2580000003 HC RX 258: Performed by: EMERGENCY MEDICINE

## 2025-08-07 PROCEDURE — 80076 HEPATIC FUNCTION PANEL: CPT

## 2025-08-07 PROCEDURE — 80048 BASIC METABOLIC PNL TOTAL CA: CPT

## 2025-08-07 PROCEDURE — 2500000003 HC RX 250 WO HCPCS

## 2025-08-07 PROCEDURE — 83735 ASSAY OF MAGNESIUM: CPT

## 2025-08-07 PROCEDURE — 93971 EXTREMITY STUDY: CPT | Performed by: SURGERY

## 2025-08-07 PROCEDURE — 93971 EXTREMITY STUDY: CPT

## 2025-08-07 PROCEDURE — 85027 COMPLETE CBC AUTOMATED: CPT

## 2025-08-07 PROCEDURE — 36415 COLL VENOUS BLD VENIPUNCTURE: CPT

## 2025-08-07 PROCEDURE — 76937 US GUIDE VASCULAR ACCESS: CPT

## 2025-08-07 RX ORDER — DEXTROSE MONOHYDRATE 100 MG/ML
INJECTION, SOLUTION INTRAVENOUS CONTINUOUS
Status: DISCONTINUED | OUTPATIENT
Start: 2025-08-07 | End: 2025-08-09

## 2025-08-07 RX ORDER — SODIUM CHLORIDE 9 MG/ML
INJECTION, SOLUTION INTRAVENOUS CONTINUOUS
Status: DISCONTINUED | OUTPATIENT
Start: 2025-08-07 | End: 2025-08-07

## 2025-08-07 RX ADMIN — SODIUM CHLORIDE: 0.9 INJECTION, SOLUTION INTRAVENOUS at 15:29

## 2025-08-07 RX ADMIN — DEXTROSE MONOHYDRATE 125 ML: 100 INJECTION, SOLUTION INTRAVENOUS at 16:46

## 2025-08-07 RX ADMIN — SODIUM CHLORIDE, PRESERVATIVE FREE 40 MG: 5 INJECTION INTRAVENOUS at 02:02

## 2025-08-07 RX ADMIN — MEROPENEM 500 MG: 500 INJECTION INTRAVENOUS at 17:46

## 2025-08-07 RX ADMIN — SODIUM CHLORIDE, PRESERVATIVE FREE 10 ML: 5 INJECTION INTRAVENOUS at 08:42

## 2025-08-07 RX ADMIN — MIDODRINE HYDROCHLORIDE 5 MG: 5 TABLET ORAL at 08:40

## 2025-08-07 RX ADMIN — DEXTROSE MONOHYDRATE: 100 INJECTION, SOLUTION INTRAVENOUS at 18:54

## 2025-08-07 RX ADMIN — SODIUM CHLORIDE, PRESERVATIVE FREE 40 MG: 5 INJECTION INTRAVENOUS at 14:23

## 2025-08-07 RX ADMIN — ESCITALOPRAM OXALATE 10 MG: 10 TABLET ORAL at 08:40

## 2025-08-07 RX ADMIN — DONEPEZIL HYDROCHLORIDE 5 MG: 5 TABLET, FILM COATED ORAL at 21:24

## 2025-08-07 ASSESSMENT — PAIN SCALES - WONG BAKER
WONGBAKER_NUMERICALRESPONSE: NO HURT
WONGBAKER_NUMERICALRESPONSE: NO HURT

## 2025-08-07 ASSESSMENT — PAIN SCALES - PAIN ASSESSMENT IN ADVANCED DEMENTIA (PAINAD)
BREATHING: NORMAL
FACIALEXPRESSION: SMILING OR INEXPRESSIVE
CONSOLABILITY: NO NEED TO CONSOLE
BODYLANGUAGE: RELAXED
TOTALSCORE: 0

## 2025-08-08 ENCOUNTER — ANESTHESIA (OUTPATIENT)
Dept: ENDOSCOPY | Age: 89
End: 2025-08-08
Payer: COMMERCIAL

## 2025-08-08 ENCOUNTER — APPOINTMENT (OUTPATIENT)
Dept: ENDOSCOPY | Age: 89
DRG: 871 | End: 2025-08-08
Attending: INTERNAL MEDICINE
Payer: COMMERCIAL

## 2025-08-08 ENCOUNTER — ANESTHESIA EVENT (OUTPATIENT)
Dept: ENDOSCOPY | Age: 89
End: 2025-08-08
Payer: COMMERCIAL

## 2025-08-08 PROBLEM — Z51.5 ENCOUNTER FOR PALLIATIVE CARE: Status: ACTIVE | Noted: 2025-08-08

## 2025-08-08 PROBLEM — Z71.89 GOALS OF CARE, COUNSELING/DISCUSSION: Status: ACTIVE | Noted: 2025-08-08

## 2025-08-08 LAB
ALBUMIN SERPL-MCNC: 2.8 G/DL (ref 3.4–5)
ALP SERPL-CCNC: 115 U/L (ref 40–129)
ALT SERPL-CCNC: 731 U/L (ref 10–40)
ANION GAP SERPL CALCULATED.3IONS-SCNC: 16 MMOL/L (ref 3–16)
AST SERPL-CCNC: 125 U/L (ref 15–37)
BACTERIA BLD CULT ORG #2: NORMAL
BACTERIA BLD CULT: NORMAL
BILIRUB DIRECT SERPL-MCNC: 0.2 MG/DL (ref 0–0.3)
BILIRUB INDIRECT SERPL-MCNC: 0.4 MG/DL (ref 0–1)
BILIRUB SERPL-MCNC: 0.6 MG/DL (ref 0–1)
BUN SERPL-MCNC: 40 MG/DL (ref 7–20)
CALCIUM SERPL-MCNC: 9.3 MG/DL (ref 8.3–10.6)
CERULOPLASMIN SERPL-MCNC: 15 MG/DL (ref 16–45)
CHLORIDE SERPL-SCNC: 93 MMOL/L (ref 99–110)
CO2 SERPL-SCNC: 17 MMOL/L (ref 21–32)
CREAT SERPL-MCNC: 4.4 MG/DL (ref 0.6–1.2)
DEPRECATED RDW RBC AUTO: 16 % (ref 12.4–15.4)
GFR SERPLBLD CREATININE-BSD FMLA CKD-EPI: 9 ML/MIN/{1.73_M2}
GLUCOSE BLD-MCNC: 108 MG/DL (ref 70–99)
GLUCOSE BLD-MCNC: 75 MG/DL (ref 70–99)
GLUCOSE BLD-MCNC: 77 MG/DL (ref 70–99)
GLUCOSE BLD-MCNC: 79 MG/DL (ref 70–99)
GLUCOSE BLD-MCNC: 94 MG/DL (ref 70–99)
GLUCOSE BLD-MCNC: 95 MG/DL (ref 70–99)
GLUCOSE SERPL-MCNC: 104 MG/DL (ref 70–99)
HCT VFR BLD AUTO: 30.1 % (ref 36–48)
HCT VFR BLD AUTO: 30.3 % (ref 36–48)
HCT VFR BLD AUTO: 31.3 % (ref 36–48)
HGB BLD-MCNC: 10.3 G/DL (ref 12–16)
HGB BLD-MCNC: 10.5 G/DL (ref 12–16)
HGB BLD-MCNC: 10.5 G/DL (ref 12–16)
MAGNESIUM SERPL-MCNC: 2.09 MG/DL (ref 1.8–2.4)
MCH RBC QN AUTO: 31.8 PG (ref 26–34)
MCHC RBC AUTO-ENTMCNC: 34.5 G/DL (ref 31–36)
MCV RBC AUTO: 92.3 FL (ref 80–100)
PERFORMED ON: ABNORMAL
PERFORMED ON: NORMAL
PHOSPHATE SERPL-MCNC: 3.5 MG/DL (ref 2.5–4.9)
PLATELET # BLD AUTO: 63 K/UL (ref 135–450)
PMV BLD AUTO: 9.6 FL (ref 5–10.5)
POTASSIUM SERPL-SCNC: 4.2 MMOL/L (ref 3.5–5.1)
PROT SERPL-MCNC: 5.1 G/DL (ref 6.4–8.2)
RBC # BLD AUTO: 3.29 M/UL (ref 4–5.2)
SODIUM SERPL-SCNC: 126 MMOL/L (ref 136–145)
VANCOMYCIN SERPL-MCNC: 13.9 UG/ML
WBC # BLD AUTO: 7 K/UL (ref 4–11)

## 2025-08-08 PROCEDURE — 7100000010 HC PHASE II RECOVERY - FIRST 15 MIN: Performed by: INTERNAL MEDICINE

## 2025-08-08 PROCEDURE — 2720000010 HC SURG SUPPLY STERILE: Performed by: INTERNAL MEDICINE

## 2025-08-08 PROCEDURE — 3700000001 HC ADD 15 MINUTES (ANESTHESIA): Performed by: INTERNAL MEDICINE

## 2025-08-08 PROCEDURE — 85018 HEMOGLOBIN: CPT

## 2025-08-08 PROCEDURE — 2580000003 HC RX 258

## 2025-08-08 PROCEDURE — 7100000011 HC PHASE II RECOVERY - ADDTL 15 MIN: Performed by: INTERNAL MEDICINE

## 2025-08-08 PROCEDURE — 2709999900 HC NON-CHARGEABLE SUPPLY: Performed by: INTERNAL MEDICINE

## 2025-08-08 PROCEDURE — 90935 HEMODIALYSIS ONE EVALUATION: CPT

## 2025-08-08 PROCEDURE — 3609013000 HC EGD TRANSORAL CONTROL BLEEDING ANY METHOD: Performed by: INTERNAL MEDICINE

## 2025-08-08 PROCEDURE — 76937 US GUIDE VASCULAR ACCESS: CPT

## 2025-08-08 PROCEDURE — 0DB68ZX EXCISION OF STOMACH, VIA NATURAL OR ARTIFICIAL OPENING ENDOSCOPIC, DIAGNOSTIC: ICD-10-PCS | Performed by: INTERNAL MEDICINE

## 2025-08-08 PROCEDURE — 6360000002 HC RX W HCPCS

## 2025-08-08 PROCEDURE — 2500000003 HC RX 250 WO HCPCS

## 2025-08-08 PROCEDURE — 36556 INSERT NON-TUNNEL CV CATH: CPT

## 2025-08-08 PROCEDURE — 36415 COLL VENOUS BLD VENIPUNCTURE: CPT

## 2025-08-08 PROCEDURE — 83735 ASSAY OF MAGNESIUM: CPT

## 2025-08-08 PROCEDURE — 6360000002 HC RX W HCPCS: Performed by: INTERNAL MEDICINE

## 2025-08-08 PROCEDURE — 6370000000 HC RX 637 (ALT 250 FOR IP)

## 2025-08-08 PROCEDURE — 99232 SBSQ HOSP IP/OBS MODERATE 35: CPT | Performed by: NURSE PRACTITIONER

## 2025-08-08 PROCEDURE — 80076 HEPATIC FUNCTION PANEL: CPT

## 2025-08-08 PROCEDURE — 2500000003 HC RX 250 WO HCPCS: Performed by: INTERNAL MEDICINE

## 2025-08-08 PROCEDURE — 2060000000 HC ICU INTERMEDIATE R&B

## 2025-08-08 PROCEDURE — 88305 TISSUE EXAM BY PATHOLOGIST: CPT

## 2025-08-08 PROCEDURE — 85027 COMPLETE CBC AUTOMATED: CPT

## 2025-08-08 PROCEDURE — 85014 HEMATOCRIT: CPT

## 2025-08-08 PROCEDURE — 80069 RENAL FUNCTION PANEL: CPT

## 2025-08-08 PROCEDURE — 80202 ASSAY OF VANCOMYCIN: CPT

## 2025-08-08 PROCEDURE — 0W3P8ZZ CONTROL BLEEDING IN GASTROINTESTINAL TRACT, VIA NATURAL OR ARTIFICIAL OPENING ENDOSCOPIC: ICD-10-PCS | Performed by: INTERNAL MEDICINE

## 2025-08-08 PROCEDURE — 3700000000 HC ANESTHESIA ATTENDED CARE: Performed by: INTERNAL MEDICINE

## 2025-08-08 PROCEDURE — C1889 IMPLANT/INSERT DEVICE, NOC: HCPCS | Performed by: INTERNAL MEDICINE

## 2025-08-08 RX ORDER — LIDOCAINE HYDROCHLORIDE 10 MG/ML
50 INJECTION, SOLUTION EPIDURAL; INFILTRATION; INTRACAUDAL; PERINEURAL ONCE
Status: DISCONTINUED | OUTPATIENT
Start: 2025-08-08 | End: 2025-08-12 | Stop reason: HOSPADM

## 2025-08-08 RX ORDER — LIDOCAINE HYDROCHLORIDE 20 MG/ML
INJECTION, SOLUTION INTRAVENOUS
Status: DISCONTINUED | OUTPATIENT
Start: 2025-08-08 | End: 2025-08-08 | Stop reason: SDUPTHER

## 2025-08-08 RX ORDER — HALOPERIDOL 5 MG/ML
5 INJECTION INTRAMUSCULAR ONCE
Status: DISCONTINUED | OUTPATIENT
Start: 2025-08-08 | End: 2025-08-08

## 2025-08-08 RX ORDER — 0.9 % SODIUM CHLORIDE 0.9 %
250 INTRAVENOUS SOLUTION INTRAVENOUS ONCE
Status: DISCONTINUED | OUTPATIENT
Start: 2025-08-08 | End: 2025-08-08

## 2025-08-08 RX ORDER — 0.9 % SODIUM CHLORIDE 0.9 %
250 INTRAVENOUS SOLUTION INTRAVENOUS ONCE
Status: COMPLETED | OUTPATIENT
Start: 2025-08-08 | End: 2025-08-08

## 2025-08-08 RX ORDER — SODIUM CHLORIDE 0.9 % (FLUSH) 0.9 %
5-40 SYRINGE (ML) INJECTION PRN
Status: DISCONTINUED | OUTPATIENT
Start: 2025-08-08 | End: 2025-08-12 | Stop reason: HOSPADM

## 2025-08-08 RX ORDER — SODIUM CHLORIDE 9 MG/ML
INJECTION, SOLUTION INTRAVENOUS PRN
Status: DISCONTINUED | OUTPATIENT
Start: 2025-08-08 | End: 2025-08-12 | Stop reason: HOSPADM

## 2025-08-08 RX ORDER — SODIUM CHLORIDE 0.9 % (FLUSH) 0.9 %
5-40 SYRINGE (ML) INJECTION EVERY 12 HOURS SCHEDULED
Status: DISCONTINUED | OUTPATIENT
Start: 2025-08-08 | End: 2025-08-12 | Stop reason: HOSPADM

## 2025-08-08 RX ORDER — SODIUM CHLORIDE, SODIUM LACTATE, POTASSIUM CHLORIDE, CALCIUM CHLORIDE 600; 310; 30; 20 MG/100ML; MG/100ML; MG/100ML; MG/100ML
INJECTION, SOLUTION INTRAVENOUS
Status: DISCONTINUED | OUTPATIENT
Start: 2025-08-08 | End: 2025-08-08 | Stop reason: SDUPTHER

## 2025-08-08 RX ORDER — PROPOFOL 10 MG/ML
INJECTION, EMULSION INTRAVENOUS
Status: DISCONTINUED | OUTPATIENT
Start: 2025-08-08 | End: 2025-08-08 | Stop reason: SDUPTHER

## 2025-08-08 RX ORDER — HALOPERIDOL 5 MG/ML
5 INJECTION INTRAMUSCULAR ONCE
Status: COMPLETED | OUTPATIENT
Start: 2025-08-08 | End: 2025-08-08

## 2025-08-08 RX ADMIN — DEXTROSE MONOHYDRATE: 100 INJECTION, SOLUTION INTRAVENOUS at 02:30

## 2025-08-08 RX ADMIN — ESCITALOPRAM OXALATE 10 MG: 10 TABLET ORAL at 08:25

## 2025-08-08 RX ADMIN — SODIUM CHLORIDE, PRESERVATIVE FREE 10 ML: 5 INJECTION INTRAVENOUS at 21:18

## 2025-08-08 RX ADMIN — PHENYLEPHRINE HYDROCHLORIDE 100 MCG: 10 INJECTION, SOLUTION INTRAMUSCULAR; INTRAVENOUS; SUBCUTANEOUS at 15:11

## 2025-08-08 RX ADMIN — MIDODRINE HYDROCHLORIDE 5 MG: 5 TABLET ORAL at 08:25

## 2025-08-08 RX ADMIN — SODIUM CHLORIDE 250 ML: 0.9 INJECTION, SOLUTION INTRAVENOUS at 21:21

## 2025-08-08 RX ADMIN — SODIUM CHLORIDE, PRESERVATIVE FREE 10 ML: 5 INJECTION INTRAVENOUS at 21:22

## 2025-08-08 RX ADMIN — WATER 5 MG: 1 INJECTION INTRAMUSCULAR; INTRAVENOUS; SUBCUTANEOUS at 00:30

## 2025-08-08 RX ADMIN — SODIUM CHLORIDE, PRESERVATIVE FREE 40 MG: 5 INJECTION INTRAVENOUS at 14:36

## 2025-08-08 RX ADMIN — MEROPENEM 500 MG: 500 INJECTION INTRAVENOUS at 21:05

## 2025-08-08 RX ADMIN — LIDOCAINE HYDROCHLORIDE 80 MG: 20 INJECTION, SOLUTION INTRAVENOUS at 14:59

## 2025-08-08 RX ADMIN — DONEPEZIL HYDROCHLORIDE 5 MG: 5 TABLET, FILM COATED ORAL at 21:42

## 2025-08-08 RX ADMIN — DEXTROSE MONOHYDRATE: 100 INJECTION, SOLUTION INTRAVENOUS at 23:58

## 2025-08-08 RX ADMIN — SODIUM CHLORIDE, SODIUM LACTATE, POTASSIUM CHLORIDE, AND CALCIUM CHLORIDE: .6; .31; .03; .02 INJECTION, SOLUTION INTRAVENOUS at 14:55

## 2025-08-08 RX ADMIN — HALOPERIDOL LACTATE 5 MG: 5 INJECTION, SOLUTION INTRAMUSCULAR at 12:16

## 2025-08-08 RX ADMIN — PROPOFOL 150 MCG/KG/MIN: 10 INJECTION, EMULSION INTRAVENOUS at 14:58

## 2025-08-08 RX ADMIN — SODIUM CHLORIDE, PRESERVATIVE FREE 40 MG: 5 INJECTION INTRAVENOUS at 02:30

## 2025-08-08 RX ADMIN — PROPOFOL 40 MG: 10 INJECTION, EMULSION INTRAVENOUS at 14:59

## 2025-08-08 RX ADMIN — PHENYLEPHRINE HYDROCHLORIDE 100 MCG: 10 INJECTION, SOLUTION INTRAMUSCULAR; INTRAVENOUS; SUBCUTANEOUS at 15:08

## 2025-08-08 RX ADMIN — PROPOFOL 10 MG: 10 INJECTION, EMULSION INTRAVENOUS at 15:01

## 2025-08-08 ASSESSMENT — PAIN - FUNCTIONAL ASSESSMENT
PAIN_FUNCTIONAL_ASSESSMENT: WONG-BAKER FACES
PAIN_FUNCTIONAL_ASSESSMENT: WONG-BAKER FACES

## 2025-08-08 ASSESSMENT — PAIN SCALES - WONG BAKER
WONGBAKER_NUMERICALRESPONSE: NO HURT

## 2025-08-08 ASSESSMENT — PAIN SCALES - GENERAL: PAINLEVEL_OUTOF10: 0

## 2025-08-09 LAB
ABO/RH: NORMAL
ALBUMIN SERPL-MCNC: 3 G/DL (ref 3.4–5)
ALP SERPL-CCNC: 102 U/L (ref 40–129)
ALT SERPL-CCNC: 451 U/L (ref 10–40)
ANION GAP SERPL CALCULATED.3IONS-SCNC: 11 MMOL/L (ref 3–16)
ANTIBODY SCREEN: NORMAL
AST SERPL-CCNC: 80 U/L (ref 15–37)
BILIRUB DIRECT SERPL-MCNC: 0.5 MG/DL (ref 0–0.3)
BILIRUB INDIRECT SERPL-MCNC: 0.4 MG/DL (ref 0–1)
BILIRUB SERPL-MCNC: 0.9 MG/DL (ref 0–1)
BLOOD BANK DISPENSE STATUS: NORMAL
BLOOD BANK PRODUCT CODE: NORMAL
BPU ID: NORMAL
BUN SERPL-MCNC: 15 MG/DL (ref 7–20)
CALCIUM SERPL-MCNC: 8.7 MG/DL (ref 8.3–10.6)
CHLORIDE SERPL-SCNC: 101 MMOL/L (ref 99–110)
CO2 SERPL-SCNC: 23 MMOL/L (ref 21–32)
CREAT SERPL-MCNC: 2.9 MG/DL (ref 0.6–1.2)
DEPRECATED RDW RBC AUTO: 17.7 % (ref 12.4–15.4)
DESCRIPTION BLOOD BANK: NORMAL
GFR SERPLBLD CREATININE-BSD FMLA CKD-EPI: 15 ML/MIN/{1.73_M2}
GLUCOSE BLD-MCNC: 100 MG/DL (ref 70–99)
GLUCOSE BLD-MCNC: 71 MG/DL (ref 70–99)
GLUCOSE SERPL-MCNC: 116 MG/DL (ref 70–99)
HCT VFR BLD AUTO: 20 % (ref 36–48)
HCT VFR BLD AUTO: 25 % (ref 36–48)
HCT VFR BLD AUTO: 25.6 % (ref 36–48)
HGB BLD-MCNC: 6.9 G/DL (ref 12–16)
HGB BLD-MCNC: 7.9 G/DL (ref 12–16)
HGB BLD-MCNC: 8.5 G/DL (ref 12–16)
LACTATE BLDV-SCNC: 1.1 MMOL/L (ref 0.4–2)
LACTATE BLDV-SCNC: 2.8 MMOL/L (ref 0.4–2)
MCH RBC QN AUTO: 31.9 PG (ref 26–34)
MCHC RBC AUTO-ENTMCNC: 30.9 G/DL (ref 31–36)
MCV RBC AUTO: 103.5 FL (ref 80–100)
PERFORMED ON: ABNORMAL
PERFORMED ON: NORMAL
PHOSPHATE SERPL-MCNC: 2.5 MG/DL (ref 2.5–4.9)
PLATELET # BLD AUTO: 57 K/UL (ref 135–450)
PMV BLD AUTO: 9.5 FL (ref 5–10.5)
POTASSIUM SERPL-SCNC: 3.5 MMOL/L (ref 3.5–5.1)
PROT SERPL-MCNC: 4.8 G/DL (ref 6.4–8.2)
RBC # BLD AUTO: 2.47 M/UL (ref 4–5.2)
REASON FOR REJECTION: NORMAL
REJECTED TEST: NORMAL
SODIUM SERPL-SCNC: 135 MMOL/L (ref 136–145)
WBC # BLD AUTO: 5.5 K/UL (ref 4–11)

## 2025-08-09 PROCEDURE — 2060000000 HC ICU INTERMEDIATE R&B

## 2025-08-09 PROCEDURE — 85014 HEMATOCRIT: CPT

## 2025-08-09 PROCEDURE — 2500000003 HC RX 250 WO HCPCS

## 2025-08-09 PROCEDURE — 86901 BLOOD TYPING SEROLOGIC RH(D): CPT

## 2025-08-09 PROCEDURE — 93005 ELECTROCARDIOGRAM TRACING: CPT | Performed by: STUDENT IN AN ORGANIZED HEALTH CARE EDUCATION/TRAINING PROGRAM

## 2025-08-09 PROCEDURE — P9016 RBC LEUKOCYTES REDUCED: HCPCS

## 2025-08-09 PROCEDURE — 83605 ASSAY OF LACTIC ACID: CPT

## 2025-08-09 PROCEDURE — 6370000000 HC RX 637 (ALT 250 FOR IP)

## 2025-08-09 PROCEDURE — 6360000002 HC RX W HCPCS: Performed by: INTERNAL MEDICINE

## 2025-08-09 PROCEDURE — 80076 HEPATIC FUNCTION PANEL: CPT

## 2025-08-09 PROCEDURE — 2580000003 HC RX 258

## 2025-08-09 PROCEDURE — 30233N1 TRANSFUSION OF NONAUTOLOGOUS RED BLOOD CELLS INTO PERIPHERAL VEIN, PERCUTANEOUS APPROACH: ICD-10-PCS | Performed by: INTERNAL MEDICINE

## 2025-08-09 PROCEDURE — 86923 COMPATIBILITY TEST ELECTRIC: CPT

## 2025-08-09 PROCEDURE — 36430 TRANSFUSION BLD/BLD COMPNT: CPT

## 2025-08-09 PROCEDURE — 6360000002 HC RX W HCPCS

## 2025-08-09 PROCEDURE — 85018 HEMOGLOBIN: CPT

## 2025-08-09 PROCEDURE — 36415 COLL VENOUS BLD VENIPUNCTURE: CPT

## 2025-08-09 PROCEDURE — 85027 COMPLETE CBC AUTOMATED: CPT

## 2025-08-09 PROCEDURE — 2500000003 HC RX 250 WO HCPCS: Performed by: INTERNAL MEDICINE

## 2025-08-09 PROCEDURE — 80069 RENAL FUNCTION PANEL: CPT

## 2025-08-09 PROCEDURE — 86900 BLOOD TYPING SEROLOGIC ABO: CPT

## 2025-08-09 PROCEDURE — 86850 RBC ANTIBODY SCREEN: CPT

## 2025-08-09 RX ORDER — SODIUM CHLORIDE 9 MG/ML
INJECTION, SOLUTION INTRAVENOUS ONCE
Status: DISCONTINUED | OUTPATIENT
Start: 2025-08-09 | End: 2025-08-09

## 2025-08-09 RX ORDER — SCOPOLAMINE 1 MG/3D
1 PATCH, EXTENDED RELEASE TRANSDERMAL
Status: DISCONTINUED | OUTPATIENT
Start: 2025-08-09 | End: 2025-08-12 | Stop reason: HOSPADM

## 2025-08-09 RX ORDER — QUETIAPINE FUMARATE 25 MG/1
25 TABLET, FILM COATED ORAL 2 TIMES DAILY
Status: DISCONTINUED | OUTPATIENT
Start: 2025-08-09 | End: 2025-08-10

## 2025-08-09 RX ORDER — HALOPERIDOL 5 MG/ML
2 INJECTION INTRAMUSCULAR ONCE
Status: COMPLETED | OUTPATIENT
Start: 2025-08-09 | End: 2025-08-09

## 2025-08-09 RX ORDER — SODIUM CHLORIDE 9 MG/ML
INJECTION, SOLUTION INTRAVENOUS CONTINUOUS
Status: DISCONTINUED | OUTPATIENT
Start: 2025-08-09 | End: 2025-08-10

## 2025-08-09 RX ORDER — 0.9 % SODIUM CHLORIDE 0.9 %
250 INTRAVENOUS SOLUTION INTRAVENOUS ONCE
Status: COMPLETED | OUTPATIENT
Start: 2025-08-09 | End: 2025-08-09

## 2025-08-09 RX ORDER — SODIUM CHLORIDE 9 MG/ML
INJECTION, SOLUTION INTRAVENOUS PRN
Status: DISCONTINUED | OUTPATIENT
Start: 2025-08-09 | End: 2025-08-12 | Stop reason: HOSPADM

## 2025-08-09 RX ORDER — SUCRALFATE ORAL 1 G/10ML
1 SUSPENSION ORAL EVERY 6 HOURS SCHEDULED
Status: DISCONTINUED | OUTPATIENT
Start: 2025-08-09 | End: 2025-08-12 | Stop reason: HOSPADM

## 2025-08-09 RX ADMIN — SODIUM CHLORIDE, PRESERVATIVE FREE 40 MG: 5 INJECTION INTRAVENOUS at 12:44

## 2025-08-09 RX ADMIN — QUETIAPINE FUMARATE 25 MG: 25 TABLET ORAL at 11:54

## 2025-08-09 RX ADMIN — SODIUM CHLORIDE 250 ML: 0.9 INJECTION, SOLUTION INTRAVENOUS at 02:00

## 2025-08-09 RX ADMIN — SODIUM CHLORIDE: 0.9 INJECTION, SOLUTION INTRAVENOUS at 08:19

## 2025-08-09 RX ADMIN — DEXTROSE MONOHYDRATE: 100 INJECTION, SOLUTION INTRAVENOUS at 05:32

## 2025-08-09 RX ADMIN — WATER 1000 MG: 1 INJECTION INTRAMUSCULAR; INTRAVENOUS; SUBCUTANEOUS at 11:53

## 2025-08-09 RX ADMIN — QUETIAPINE FUMARATE 25 MG: 25 TABLET ORAL at 20:39

## 2025-08-09 RX ADMIN — SODIUM CHLORIDE, PRESERVATIVE FREE 40 MG: 5 INJECTION INTRAVENOUS at 02:08

## 2025-08-09 RX ADMIN — HALOPERIDOL LACTATE 2 MG: 5 INJECTION, SOLUTION INTRAMUSCULAR at 08:21

## 2025-08-09 RX ADMIN — DONEPEZIL HYDROCHLORIDE 5 MG: 5 TABLET, FILM COATED ORAL at 20:39

## 2025-08-09 RX ADMIN — SODIUM CHLORIDE, PRESERVATIVE FREE 10 ML: 5 INJECTION INTRAVENOUS at 20:39

## 2025-08-09 RX ADMIN — MIDODRINE HYDROCHLORIDE 5 MG: 5 TABLET ORAL at 11:54

## 2025-08-09 RX ADMIN — SUCRALFATE ORAL SUSPENSION 1 G: 1 SUSPENSION ORAL at 12:47

## 2025-08-09 ASSESSMENT — PAIN SCALES - GENERAL
PAINLEVEL_OUTOF10: 0
PAINLEVEL_OUTOF10: 0
PAINLEVEL_OUTOF10: 4
PAINLEVEL_OUTOF10: 0
PAINLEVEL_OUTOF10: 0

## 2025-08-10 LAB
ALBUMIN SERPL-MCNC: 3.1 G/DL (ref 3.4–5)
ALP SERPL-CCNC: 96 U/L (ref 40–129)
ALT SERPL-CCNC: 355 U/L (ref 10–40)
ANION GAP SERPL CALCULATED.3IONS-SCNC: 11 MMOL/L (ref 3–16)
AST SERPL-CCNC: 69 U/L (ref 15–37)
BASE EXCESS BLDV CALC-SCNC: -1.4 MMOL/L (ref -2–3)
BASOPHILS # BLD: 0 K/UL (ref 0–0.2)
BASOPHILS NFR BLD: 0.3 %
BILIRUB DIRECT SERPL-MCNC: 0.4 MG/DL (ref 0–0.3)
BILIRUB INDIRECT SERPL-MCNC: 0.3 MG/DL (ref 0–1)
BILIRUB SERPL-MCNC: 0.7 MG/DL (ref 0–1)
BUN SERPL-MCNC: 17 MG/DL (ref 7–20)
CALCIUM SERPL-MCNC: 8.8 MG/DL (ref 8.3–10.6)
CHLORIDE SERPL-SCNC: 104 MMOL/L (ref 99–110)
CO2 BLDV-SCNC: 25 MMOL/L
CO2 SERPL-SCNC: 22 MMOL/L (ref 21–32)
COHGB MFR BLDV: 1.7 % (ref 0–1.5)
CREAT SERPL-MCNC: 3.8 MG/DL (ref 0.6–1.2)
DEPRECATED RDW RBC AUTO: 15.9 % (ref 12.4–15.4)
DO-HGB MFR BLDV: 11 %
EKG ATRIAL RATE: 90 BPM
EKG DIAGNOSIS: NORMAL
EKG P AXIS: 76 DEGREES
EKG P-R INTERVAL: 142 MS
EKG Q-T INTERVAL: 450 MS
EKG QRS DURATION: 138 MS
EKG QTC CALCULATION (BAZETT): 550 MS
EKG R AXIS: -78 DEGREES
EKG T AXIS: 99 DEGREES
EKG VENTRICULAR RATE: 90 BPM
EOSINOPHIL # BLD: 0.2 K/UL (ref 0–0.6)
EOSINOPHIL NFR BLD: 3.1 %
GFR SERPLBLD CREATININE-BSD FMLA CKD-EPI: 11 ML/MIN/{1.73_M2}
GLUCOSE BLD-MCNC: 107 MG/DL (ref 70–99)
GLUCOSE BLD-MCNC: 120 MG/DL (ref 70–99)
GLUCOSE BLD-MCNC: 68 MG/DL (ref 70–99)
GLUCOSE BLD-MCNC: 69 MG/DL (ref 70–99)
GLUCOSE BLD-MCNC: 75 MG/DL (ref 70–99)
GLUCOSE BLD-MCNC: 77 MG/DL (ref 70–99)
GLUCOSE BLD-MCNC: 84 MG/DL (ref 70–99)
GLUCOSE SERPL-MCNC: 93 MG/DL (ref 70–99)
HCO3 BLDV-SCNC: 23.6 MMOL/L (ref 24–28)
HCT VFR BLD AUTO: 26.8 % (ref 36–48)
HGB BLD-MCNC: 9.2 G/DL (ref 12–16)
LYMPHOCYTES # BLD: 1.6 K/UL (ref 1–5.1)
LYMPHOCYTES NFR BLD: 20.9 %
MAGNESIUM SERPL-MCNC: 1.91 MG/DL (ref 1.8–2.4)
MCH RBC QN AUTO: 31.5 PG (ref 26–34)
MCHC RBC AUTO-ENTMCNC: 34.4 G/DL (ref 31–36)
MCV RBC AUTO: 91.6 FL (ref 80–100)
METHGB MFR BLDV: 0.5 % (ref 0–1.5)
MONOCYTES # BLD: 0.9 K/UL (ref 0–1.3)
MONOCYTES NFR BLD: 11 %
NEUTROPHILS # BLD: 5.1 K/UL (ref 1.7–7.7)
NEUTROPHILS NFR BLD: 64.7 %
PCO2 BLDV: 39.8 MMHG (ref 41–51)
PERFORMED ON: ABNORMAL
PERFORMED ON: NORMAL
PH BLDV: 7.38 [PH] (ref 7.35–7.45)
PHOSPHATE SERPL-MCNC: 3.1 MG/DL (ref 2.5–4.9)
PLATELET # BLD AUTO: 78 K/UL (ref 135–450)
PMV BLD AUTO: 9.8 FL (ref 5–10.5)
PO2 BLDV: 55.7 MMHG (ref 25–40)
POTASSIUM SERPL-SCNC: 3.5 MMOL/L (ref 3.5–5.1)
PROT SERPL-MCNC: 4.7 G/DL (ref 6.4–8.2)
RBC # BLD AUTO: 2.93 M/UL (ref 4–5.2)
SAO2 % BLDV: 89 %
SODIUM SERPL-SCNC: 137 MMOL/L (ref 136–145)
WBC # BLD AUTO: 7.8 K/UL (ref 4–11)

## 2025-08-10 PROCEDURE — 82803 BLOOD GASES ANY COMBINATION: CPT

## 2025-08-10 PROCEDURE — 83735 ASSAY OF MAGNESIUM: CPT

## 2025-08-10 PROCEDURE — 6360000002 HC RX W HCPCS: Performed by: INTERNAL MEDICINE

## 2025-08-10 PROCEDURE — 2500000003 HC RX 250 WO HCPCS

## 2025-08-10 PROCEDURE — 2060000000 HC ICU INTERMEDIATE R&B

## 2025-08-10 PROCEDURE — 80076 HEPATIC FUNCTION PANEL: CPT

## 2025-08-10 PROCEDURE — 6360000002 HC RX W HCPCS

## 2025-08-10 PROCEDURE — 6370000000 HC RX 637 (ALT 250 FOR IP)

## 2025-08-10 PROCEDURE — 85025 COMPLETE CBC W/AUTO DIFF WBC: CPT

## 2025-08-10 PROCEDURE — 2580000003 HC RX 258: Performed by: EMERGENCY MEDICINE

## 2025-08-10 PROCEDURE — 2500000003 HC RX 250 WO HCPCS: Performed by: INTERNAL MEDICINE

## 2025-08-10 PROCEDURE — 93010 ELECTROCARDIOGRAM REPORT: CPT | Performed by: STUDENT IN AN ORGANIZED HEALTH CARE EDUCATION/TRAINING PROGRAM

## 2025-08-10 PROCEDURE — 36415 COLL VENOUS BLD VENIPUNCTURE: CPT

## 2025-08-10 PROCEDURE — 80069 RENAL FUNCTION PANEL: CPT

## 2025-08-10 RX ORDER — QUETIAPINE FUMARATE 25 MG/1
50 TABLET, FILM COATED ORAL 2 TIMES DAILY
Status: DISCONTINUED | OUTPATIENT
Start: 2025-08-10 | End: 2025-08-12 | Stop reason: HOSPADM

## 2025-08-10 RX ADMIN — QUETIAPINE FUMARATE 25 MG: 25 TABLET ORAL at 08:32

## 2025-08-10 RX ADMIN — SODIUM CHLORIDE, PRESERVATIVE FREE 10 ML: 5 INJECTION INTRAVENOUS at 21:19

## 2025-08-10 RX ADMIN — DEXTROSE MONOHYDRATE 125 ML: 100 INJECTION, SOLUTION INTRAVENOUS at 02:17

## 2025-08-10 RX ADMIN — DEXTROSE MONOHYDRATE 125 ML: 100 INJECTION, SOLUTION INTRAVENOUS at 21:30

## 2025-08-10 RX ADMIN — ESCITALOPRAM OXALATE 10 MG: 10 TABLET ORAL at 08:32

## 2025-08-10 RX ADMIN — WATER 1000 MG: 1 INJECTION INTRAMUSCULAR; INTRAVENOUS; SUBCUTANEOUS at 10:50

## 2025-08-10 RX ADMIN — SODIUM CHLORIDE, PRESERVATIVE FREE 40 MG: 5 INJECTION INTRAVENOUS at 14:55

## 2025-08-10 RX ADMIN — SODIUM CHLORIDE, PRESERVATIVE FREE 40 MG: 5 INJECTION INTRAVENOUS at 02:30

## 2025-08-10 RX ADMIN — MIDODRINE HYDROCHLORIDE 5 MG: 5 TABLET ORAL at 08:32

## 2025-08-10 ASSESSMENT — PAIN SCALES - GENERAL
PAINLEVEL_OUTOF10: 0

## 2025-08-11 ENCOUNTER — APPOINTMENT (OUTPATIENT)
Dept: CT IMAGING | Age: 89
DRG: 871 | End: 2025-08-11
Payer: COMMERCIAL

## 2025-08-11 LAB
ALBUMIN SERPL-MCNC: 2.9 G/DL (ref 3.4–5)
ALBUMIN SERPL-MCNC: 3.1 G/DL (ref 3.4–5)
ALP SERPL-CCNC: 112 U/L (ref 40–129)
ALT SERPL-CCNC: 303 U/L (ref 10–40)
ANION GAP SERPL CALCULATED.3IONS-SCNC: 11 MMOL/L (ref 3–16)
AST SERPL-CCNC: 58 U/L (ref 15–37)
BASOPHILS # BLD: 0 K/UL (ref 0–0.2)
BASOPHILS # BLD: 0 K/UL (ref 0–0.2)
BASOPHILS NFR BLD: 0.2 %
BASOPHILS NFR BLD: 0.6 %
BILIRUB DIRECT SERPL-MCNC: 0.4 MG/DL (ref 0–0.3)
BILIRUB INDIRECT SERPL-MCNC: 0.3 MG/DL (ref 0–1)
BILIRUB SERPL-MCNC: 0.7 MG/DL (ref 0–1)
BUN SERPL-MCNC: 13 MG/DL (ref 7–20)
CALCIUM SERPL-MCNC: 7.9 MG/DL (ref 8.3–10.6)
CHLORIDE SERPL-SCNC: 101 MMOL/L (ref 99–110)
CO2 SERPL-SCNC: 23 MMOL/L (ref 21–32)
CREAT SERPL-MCNC: 3.4 MG/DL (ref 0.6–1.2)
DEPRECATED RDW RBC AUTO: 16.2 % (ref 12.4–15.4)
DEPRECATED RDW RBC AUTO: 16.3 % (ref 12.4–15.4)
EOSINOPHIL # BLD: 0.2 K/UL (ref 0–0.6)
EOSINOPHIL # BLD: 0.2 K/UL (ref 0–0.6)
EOSINOPHIL NFR BLD: 2.5 %
EOSINOPHIL NFR BLD: 3.2 %
GFR SERPLBLD CREATININE-BSD FMLA CKD-EPI: 12 ML/MIN/{1.73_M2}
GLUCOSE BLD-MCNC: 112 MG/DL (ref 70–99)
GLUCOSE BLD-MCNC: 146 MG/DL (ref 70–99)
GLUCOSE BLD-MCNC: 61 MG/DL (ref 70–99)
GLUCOSE BLD-MCNC: 70 MG/DL (ref 70–99)
GLUCOSE BLD-MCNC: 75 MG/DL (ref 70–99)
GLUCOSE BLD-MCNC: 83 MG/DL (ref 70–99)
GLUCOSE SERPL-MCNC: 79 MG/DL (ref 70–99)
HCT VFR BLD AUTO: 27 % (ref 36–48)
HCT VFR BLD AUTO: 30.1 % (ref 36–48)
HGB BLD-MCNC: 10.4 G/DL (ref 12–16)
HGB BLD-MCNC: 9.2 G/DL (ref 12–16)
LYMPHOCYTES # BLD: 1.1 K/UL (ref 1–5.1)
LYMPHOCYTES # BLD: 1.2 K/UL (ref 1–5.1)
LYMPHOCYTES NFR BLD: 15.9 %
LYMPHOCYTES NFR BLD: 17.2 %
MAGNESIUM SERPL-MCNC: 1.79 MG/DL (ref 1.8–2.4)
MCH RBC QN AUTO: 31 PG (ref 26–34)
MCH RBC QN AUTO: 31.7 PG (ref 26–34)
MCHC RBC AUTO-ENTMCNC: 34 G/DL (ref 31–36)
MCHC RBC AUTO-ENTMCNC: 34.5 G/DL (ref 31–36)
MCV RBC AUTO: 91.3 FL (ref 80–100)
MCV RBC AUTO: 91.9 FL (ref 80–100)
MONOCYTES # BLD: 0.5 K/UL (ref 0–1.3)
MONOCYTES # BLD: 0.7 K/UL (ref 0–1.3)
MONOCYTES NFR BLD: 8.4 %
MONOCYTES NFR BLD: 9 %
NEUTROPHILS # BLD: 4.5 K/UL (ref 1.7–7.7)
NEUTROPHILS # BLD: 5.5 K/UL (ref 1.7–7.7)
NEUTROPHILS NFR BLD: 70.6 %
NEUTROPHILS NFR BLD: 72.4 %
PERFORMED ON: ABNORMAL
PERFORMED ON: NORMAL
PHOSPHATE SERPL-MCNC: 2.7 MG/DL (ref 2.5–4.9)
PLATELET # BLD AUTO: 71 K/UL (ref 135–450)
PLATELET # BLD AUTO: 97 K/UL (ref 135–450)
PMV BLD AUTO: 9 FL (ref 5–10.5)
PMV BLD AUTO: 9.5 FL (ref 5–10.5)
POTASSIUM SERPL-SCNC: 3.6 MMOL/L (ref 3.5–5.1)
PROT SERPL-MCNC: 5.4 G/DL (ref 6.4–8.2)
RBC # BLD AUTO: 2.96 M/UL (ref 4–5.2)
RBC # BLD AUTO: 3.27 M/UL (ref 4–5.2)
SODIUM SERPL-SCNC: 135 MMOL/L (ref 136–145)
WBC # BLD AUTO: 6.4 K/UL (ref 4–11)
WBC # BLD AUTO: 7.5 K/UL (ref 4–11)

## 2025-08-11 PROCEDURE — 80076 HEPATIC FUNCTION PANEL: CPT

## 2025-08-11 PROCEDURE — 6360000002 HC RX W HCPCS

## 2025-08-11 PROCEDURE — 6360000002 HC RX W HCPCS: Performed by: INTERNAL MEDICINE

## 2025-08-11 PROCEDURE — 36415 COLL VENOUS BLD VENIPUNCTURE: CPT

## 2025-08-11 PROCEDURE — 2500000003 HC RX 250 WO HCPCS

## 2025-08-11 PROCEDURE — 2060000000 HC ICU INTERMEDIATE R&B

## 2025-08-11 PROCEDURE — 70450 CT HEAD/BRAIN W/O DYE: CPT

## 2025-08-11 PROCEDURE — 6370000000 HC RX 637 (ALT 250 FOR IP): Performed by: STUDENT IN AN ORGANIZED HEALTH CARE EDUCATION/TRAINING PROGRAM

## 2025-08-11 PROCEDURE — 83735 ASSAY OF MAGNESIUM: CPT

## 2025-08-11 PROCEDURE — 85025 COMPLETE CBC W/AUTO DIFF WBC: CPT

## 2025-08-11 PROCEDURE — 93005 ELECTROCARDIOGRAM TRACING: CPT

## 2025-08-11 PROCEDURE — 6370000000 HC RX 637 (ALT 250 FOR IP)

## 2025-08-11 PROCEDURE — 2500000003 HC RX 250 WO HCPCS: Performed by: INTERNAL MEDICINE

## 2025-08-11 PROCEDURE — 2580000003 HC RX 258: Performed by: EMERGENCY MEDICINE

## 2025-08-11 PROCEDURE — 80069 RENAL FUNCTION PANEL: CPT

## 2025-08-11 PROCEDURE — 90935 HEMODIALYSIS ONE EVALUATION: CPT

## 2025-08-11 RX ORDER — MAGNESIUM SULFATE IN WATER 40 MG/ML
2000 INJECTION, SOLUTION INTRAVENOUS ONCE
Status: COMPLETED | OUTPATIENT
Start: 2025-08-11 | End: 2025-08-11

## 2025-08-11 RX ORDER — KETOROLAC TROMETHAMINE 15 MG/ML
15 INJECTION, SOLUTION INTRAMUSCULAR; INTRAVENOUS EVERY 6 HOURS PRN
Status: DISCONTINUED | OUTPATIENT
Start: 2025-08-11 | End: 2025-08-12

## 2025-08-11 RX ADMIN — SUCRALFATE ORAL SUSPENSION 1 G: 1 SUSPENSION ORAL at 12:00

## 2025-08-11 RX ADMIN — SODIUM CHLORIDE, PRESERVATIVE FREE 10 ML: 5 INJECTION INTRAVENOUS at 13:25

## 2025-08-11 RX ADMIN — DONEPEZIL HYDROCHLORIDE 5 MG: 5 TABLET, FILM COATED ORAL at 21:43

## 2025-08-11 RX ADMIN — SODIUM CHLORIDE, PRESERVATIVE FREE 40 MG: 5 INJECTION INTRAVENOUS at 21:38

## 2025-08-11 RX ADMIN — SODIUM CHLORIDE, PRESERVATIVE FREE 40 MG: 5 INJECTION INTRAVENOUS at 01:54

## 2025-08-11 RX ADMIN — SODIUM CHLORIDE, PRESERVATIVE FREE 40 MG: 5 INJECTION INTRAVENOUS at 13:22

## 2025-08-11 RX ADMIN — KETOROLAC TROMETHAMINE 15 MG: 15 INJECTION, SOLUTION INTRAMUSCULAR; INTRAVENOUS at 23:23

## 2025-08-11 RX ADMIN — SUCRALFATE ORAL SUSPENSION 1 G: 1 SUSPENSION ORAL at 05:10

## 2025-08-11 RX ADMIN — MIDODRINE HYDROCHLORIDE 5 MG: 5 TABLET ORAL at 16:26

## 2025-08-11 RX ADMIN — MIDODRINE HYDROCHLORIDE 5 MG: 5 TABLET ORAL at 13:09

## 2025-08-11 RX ADMIN — DEXTROSE MONOHYDRATE 125 ML: 100 INJECTION, SOLUTION INTRAVENOUS at 02:01

## 2025-08-11 RX ADMIN — SODIUM CHLORIDE, PRESERVATIVE FREE 10 ML: 5 INJECTION INTRAVENOUS at 21:42

## 2025-08-11 RX ADMIN — SUCRALFATE ORAL SUSPENSION 1 G: 1 SUSPENSION ORAL at 18:30

## 2025-08-11 RX ADMIN — MAGNESIUM SULFATE HEPTAHYDRATE 2000 MG: 40 INJECTION, SOLUTION INTRAVENOUS at 16:27

## 2025-08-11 RX ADMIN — QUETIAPINE FUMARATE 50 MG: 25 TABLET ORAL at 21:43

## 2025-08-11 RX ADMIN — ESCITALOPRAM OXALATE 10 MG: 10 TABLET ORAL at 13:09

## 2025-08-11 RX ADMIN — QUETIAPINE FUMARATE 50 MG: 25 TABLET ORAL at 13:10

## 2025-08-11 ASSESSMENT — PAIN SCALES - PAIN ASSESSMENT IN ADVANCED DEMENTIA (PAINAD)
FACIALEXPRESSION: SMILING OR INEXPRESSIVE
BODYLANGUAGE: RELAXED
FACIALEXPRESSION: SMILING OR INEXPRESSIVE
CONSOLABILITY: DISTRACTED OR REASSURED BY VOICE/TOUCH
CONSOLABILITY: NO NEED TO CONSOLE
TOTALSCORE: 0
CONSOLABILITY: NO NEED TO CONSOLE
BODYLANGUAGE: RELAXED
BREATHING: NORMAL
TOTALSCORE: 5
FACIALEXPRESSION: SAD, FRIGHTENED, FROWN
NEGVOCALIZATION: REPEATED TROUBLED CALLING OUT, LOUD MOANING/GROANING, CRYING
BODYLANGUAGE: TENSE, DISTRESSED PACING, FIDGETING
TOTALSCORE: 0

## 2025-08-11 ASSESSMENT — PAIN SCALES - GENERAL
PAINLEVEL_OUTOF10: 0

## 2025-08-11 ASSESSMENT — PAIN - FUNCTIONAL ASSESSMENT
PAIN_FUNCTIONAL_ASSESSMENT: PAIN ASSESSMENT IN ADVANCED DEMENTIA (PAINAD)
PAIN_FUNCTIONAL_ASSESSMENT: PAIN ASSESSMENT IN ADVANCED DEMENTIA (PAINAD)

## 2025-08-12 VITALS
HEIGHT: 60 IN | OXYGEN SATURATION: 99 % | RESPIRATION RATE: 20 BRPM | WEIGHT: 155.2 LBS | DIASTOLIC BLOOD PRESSURE: 73 MMHG | SYSTOLIC BLOOD PRESSURE: 150 MMHG | TEMPERATURE: 97.8 F | HEART RATE: 76 BPM | BODY MASS INDEX: 30.47 KG/M2

## 2025-08-12 LAB
ALBUMIN SERPL-MCNC: 2.7 G/DL (ref 3.4–5)
ALP SERPL-CCNC: 104 U/L (ref 40–129)
ALT SERPL-CCNC: 213 U/L (ref 10–40)
AST SERPL-CCNC: 47 U/L (ref 15–37)
BASOPHILS # BLD: 0 K/UL (ref 0–0.2)
BASOPHILS NFR BLD: 0.3 %
BILIRUB DIRECT SERPL-MCNC: 0.2 MG/DL (ref 0–0.3)
BILIRUB INDIRECT SERPL-MCNC: 0.2 MG/DL (ref 0–1)
BILIRUB SERPL-MCNC: 0.4 MG/DL (ref 0–1)
DEPRECATED RDW RBC AUTO: 16.4 % (ref 12.4–15.4)
EKG ATRIAL RATE: 67 BPM
EKG DIAGNOSIS: NORMAL
EKG P AXIS: 71 DEGREES
EKG P-R INTERVAL: 140 MS
EKG Q-T INTERVAL: 496 MS
EKG QRS DURATION: 140 MS
EKG QTC CALCULATION (BAZETT): 524 MS
EKG R AXIS: -69 DEGREES
EKG T AXIS: 105 DEGREES
EKG VENTRICULAR RATE: 67 BPM
EOSINOPHIL # BLD: 0.1 K/UL (ref 0–0.6)
EOSINOPHIL NFR BLD: 1.9 %
GLUCOSE BLD-MCNC: 104 MG/DL (ref 70–99)
GLUCOSE BLD-MCNC: 159 MG/DL (ref 70–99)
GLUCOSE BLD-MCNC: 93 MG/DL (ref 70–99)
HCT VFR BLD AUTO: 26.5 % (ref 36–48)
HGB BLD-MCNC: 9 G/DL (ref 12–16)
LYMPHOCYTES # BLD: 1.2 K/UL (ref 1–5.1)
LYMPHOCYTES NFR BLD: 17.7 %
MAGNESIUM SERPL-MCNC: 2.49 MG/DL (ref 1.8–2.4)
MCH RBC QN AUTO: 31.2 PG (ref 26–34)
MCHC RBC AUTO-ENTMCNC: 34 G/DL (ref 31–36)
MCV RBC AUTO: 91.9 FL (ref 80–100)
MONOCYTES # BLD: 0.9 K/UL (ref 0–1.3)
MONOCYTES NFR BLD: 13.1 %
NEUTROPHILS # BLD: 4.4 K/UL (ref 1.7–7.7)
NEUTROPHILS NFR BLD: 67 %
PERFORMED ON: ABNORMAL
PERFORMED ON: ABNORMAL
PERFORMED ON: NORMAL
PLATELET # BLD AUTO: 73 K/UL (ref 135–450)
PMV BLD AUTO: 9.4 FL (ref 5–10.5)
PROT SERPL-MCNC: 4.8 G/DL (ref 6.4–8.2)
RBC # BLD AUTO: 2.88 M/UL (ref 4–5.2)
WBC # BLD AUTO: 6.5 K/UL (ref 4–11)

## 2025-08-12 PROCEDURE — 6370000000 HC RX 637 (ALT 250 FOR IP)

## 2025-08-12 PROCEDURE — 6370000000 HC RX 637 (ALT 250 FOR IP): Performed by: STUDENT IN AN ORGANIZED HEALTH CARE EDUCATION/TRAINING PROGRAM

## 2025-08-12 PROCEDURE — 93010 ELECTROCARDIOGRAM REPORT: CPT | Performed by: STUDENT IN AN ORGANIZED HEALTH CARE EDUCATION/TRAINING PROGRAM

## 2025-08-12 PROCEDURE — 85025 COMPLETE CBC W/AUTO DIFF WBC: CPT

## 2025-08-12 PROCEDURE — 2500000003 HC RX 250 WO HCPCS

## 2025-08-12 PROCEDURE — 83735 ASSAY OF MAGNESIUM: CPT

## 2025-08-12 PROCEDURE — 80076 HEPATIC FUNCTION PANEL: CPT

## 2025-08-12 PROCEDURE — 2500000003 HC RX 250 WO HCPCS: Performed by: INTERNAL MEDICINE

## 2025-08-12 PROCEDURE — 6360000002 HC RX W HCPCS: Performed by: INTERNAL MEDICINE

## 2025-08-12 PROCEDURE — 36415 COLL VENOUS BLD VENIPUNCTURE: CPT

## 2025-08-12 RX ORDER — QUETIAPINE FUMARATE 50 MG/1
50 TABLET, FILM COATED ORAL 2 TIMES DAILY
DISCHARGE
Start: 2025-08-12 | End: 2025-08-17

## 2025-08-12 RX ORDER — SUCRALFATE ORAL 1 G/10ML
1 SUSPENSION ORAL
DISCHARGE
Start: 2025-08-12 | End: 2025-08-17

## 2025-08-12 RX ORDER — OXYCODONE HYDROCHLORIDE 5 MG/1
2.5 TABLET ORAL EVERY 6 HOURS PRN
Qty: 6 TABLET | Refills: 0 | Status: SHIPPED | OUTPATIENT
Start: 2025-08-12 | End: 2025-08-15

## 2025-08-12 RX ORDER — PANTOPRAZOLE SODIUM 40 MG/1
40 TABLET, DELAYED RELEASE ORAL
DISCHARGE
Start: 2025-08-12 | End: 2025-10-07

## 2025-08-12 RX ORDER — ATORVASTATIN CALCIUM 10 MG/1
10 TABLET, FILM COATED ORAL
DISCHARGE
Start: 2025-08-12

## 2025-08-12 RX ADMIN — SODIUM CHLORIDE, PRESERVATIVE FREE 10 ML: 5 INJECTION INTRAVENOUS at 10:00

## 2025-08-12 RX ADMIN — SODIUM CHLORIDE, PRESERVATIVE FREE 40 MG: 5 INJECTION INTRAVENOUS at 10:40

## 2025-08-12 RX ADMIN — SUCRALFATE ORAL SUSPENSION 1 G: 1 SUSPENSION ORAL at 00:02

## 2025-08-12 RX ADMIN — SUCRALFATE ORAL SUSPENSION 1 G: 1 SUSPENSION ORAL at 12:00

## 2025-08-12 RX ADMIN — ESCITALOPRAM OXALATE 10 MG: 10 TABLET ORAL at 10:40

## 2025-08-12 RX ADMIN — QUETIAPINE FUMARATE 50 MG: 25 TABLET ORAL at 10:40

## 2025-08-12 RX ADMIN — SODIUM CHLORIDE, PRESERVATIVE FREE 10 ML: 5 INJECTION INTRAVENOUS at 10:40

## 2025-08-12 RX ADMIN — SUCRALFATE ORAL SUSPENSION 1 G: 1 SUSPENSION ORAL at 06:13

## 2025-08-12 RX ADMIN — MIDODRINE HYDROCHLORIDE 5 MG: 5 TABLET ORAL at 11:24

## 2025-08-12 ASSESSMENT — PAIN SCALES - PAIN ASSESSMENT IN ADVANCED DEMENTIA (PAINAD)
TOTALSCORE: 0
BODYLANGUAGE: RELAXED
CONSOLABILITY: NO NEED TO CONSOLE
BODYLANGUAGE: RELAXED
FACIALEXPRESSION: SMILING OR INEXPRESSIVE
CONSOLABILITY: NO NEED TO CONSOLE
FACIALEXPRESSION: SMILING OR INEXPRESSIVE
BREATHING: NORMAL
FACIALEXPRESSION: SMILING OR INEXPRESSIVE
CONSOLABILITY: NO NEED TO CONSOLE
BODYLANGUAGE: RELAXED
BREATHING: NORMAL
TOTALSCORE: 0
FACIALEXPRESSION: SMILING OR INEXPRESSIVE
TOTALSCORE: 0
BODYLANGUAGE: RELAXED
BREATHING: NORMAL
BREATHING: NORMAL
TOTALSCORE: 0
CONSOLABILITY: NO NEED TO CONSOLE

## 2025-08-12 ASSESSMENT — PAIN SCALES - GENERAL
PAINLEVEL_OUTOF10: 0

## 2025-08-15 ENCOUNTER — TELEPHONE (OUTPATIENT)
Dept: INTERNAL MEDICINE CLINIC | Age: 89
End: 2025-08-15

## 2025-08-18 ENCOUNTER — HOSPITAL ENCOUNTER (INPATIENT)
Age: 89
LOS: 6 days | Discharge: NURSING, REHAB OR LONG-TERM CARE | DRG: 377 | End: 2025-08-24
Attending: EMERGENCY MEDICINE | Admitting: INTERNAL MEDICINE
Payer: COMMERCIAL

## 2025-08-18 ENCOUNTER — APPOINTMENT (OUTPATIENT)
Dept: CT IMAGING | Age: 89
DRG: 377 | End: 2025-08-18
Payer: COMMERCIAL

## 2025-08-18 DIAGNOSIS — K92.1 MELENA: Primary | ICD-10-CM

## 2025-08-18 LAB
ALBUMIN SERPL-MCNC: 2.9 G/DL (ref 3.4–5)
ALP SERPL-CCNC: 108 U/L (ref 40–129)
ALT SERPL-CCNC: 64 U/L (ref 10–40)
ANION GAP SERPL CALCULATED.3IONS-SCNC: 8 MMOL/L (ref 3–16)
APTT BLD: 35.7 SEC (ref 22.8–35.8)
AST SERPL-CCNC: 38 U/L (ref 15–37)
BASOPHILS # BLD: 0 K/UL (ref 0–0.2)
BASOPHILS NFR BLD: 0.4 %
BILIRUB DIRECT SERPL-MCNC: 0.2 MG/DL (ref 0–0.3)
BILIRUB INDIRECT SERPL-MCNC: 0.1 MG/DL (ref 0–1)
BILIRUB SERPL-MCNC: 0.3 MG/DL (ref 0–1)
BUN SERPL-MCNC: 7 MG/DL (ref 7–20)
CALCIUM SERPL-MCNC: 8.7 MG/DL (ref 8.3–10.6)
CHLORIDE SERPL-SCNC: 104 MMOL/L (ref 99–110)
CO2 SERPL-SCNC: 23 MMOL/L (ref 21–32)
CREAT SERPL-MCNC: 2.4 MG/DL (ref 0.6–1.2)
DEPRECATED RDW RBC AUTO: 16.9 % (ref 12.4–15.4)
EOSINOPHIL # BLD: 0.1 K/UL (ref 0–0.6)
EOSINOPHIL NFR BLD: 1.3 %
GFR SERPLBLD CREATININE-BSD FMLA CKD-EPI: 19 ML/MIN/{1.73_M2}
GLUCOSE SERPL-MCNC: 80 MG/DL (ref 70–99)
HCT VFR BLD AUTO: 22.4 % (ref 36–48)
HGB BLD-MCNC: 7.4 G/DL (ref 12–16)
INR PPP: 1.37 (ref 0.86–1.14)
LIPASE SERPL-CCNC: 59 U/L (ref 13–60)
LYMPHOCYTES # BLD: 0.8 K/UL (ref 1–5.1)
LYMPHOCYTES NFR BLD: 14.8 %
MCH RBC QN AUTO: 31.1 PG (ref 26–34)
MCHC RBC AUTO-ENTMCNC: 33.1 G/DL (ref 31–36)
MCV RBC AUTO: 94.2 FL (ref 80–100)
MONOCYTES # BLD: 0.6 K/UL (ref 0–1.3)
MONOCYTES NFR BLD: 11.5 %
NEUTROPHILS # BLD: 3.9 K/UL (ref 1.7–7.7)
NEUTROPHILS NFR BLD: 72 %
PLATELET # BLD AUTO: 91 K/UL (ref 135–450)
PMV BLD AUTO: 10.6 FL (ref 5–10.5)
POTASSIUM SERPL-SCNC: 4 MMOL/L (ref 3.5–5.1)
PROT SERPL-MCNC: 4.6 G/DL (ref 6.4–8.2)
PROTHROMBIN TIME: 17.1 SEC (ref 12.1–14.9)
RBC # BLD AUTO: 2.38 M/UL (ref 4–5.2)
SODIUM SERPL-SCNC: 135 MMOL/L (ref 136–145)
WBC # BLD AUTO: 5.4 K/UL (ref 4–11)

## 2025-08-18 PROCEDURE — 85025 COMPLETE CBC W/AUTO DIFF WBC: CPT

## 2025-08-18 PROCEDURE — 99285 EMERGENCY DEPT VISIT HI MDM: CPT

## 2025-08-18 PROCEDURE — 80048 BASIC METABOLIC PNL TOTAL CA: CPT

## 2025-08-18 PROCEDURE — 80076 HEPATIC FUNCTION PANEL: CPT

## 2025-08-18 PROCEDURE — 93005 ELECTROCARDIOGRAM TRACING: CPT | Performed by: STUDENT IN AN ORGANIZED HEALTH CARE EDUCATION/TRAINING PROGRAM

## 2025-08-18 PROCEDURE — 85610 PROTHROMBIN TIME: CPT

## 2025-08-18 PROCEDURE — 6360000004 HC RX CONTRAST MEDICATION: Performed by: EMERGENCY MEDICINE

## 2025-08-18 PROCEDURE — 2060000000 HC ICU INTERMEDIATE R&B

## 2025-08-18 PROCEDURE — 85730 THROMBOPLASTIN TIME PARTIAL: CPT

## 2025-08-18 PROCEDURE — 83690 ASSAY OF LIPASE: CPT

## 2025-08-18 PROCEDURE — 74177 CT ABD & PELVIS W/CONTRAST: CPT

## 2025-08-18 RX ORDER — ACETAMINOPHEN 650 MG/1
650 SUPPOSITORY RECTAL EVERY 6 HOURS PRN
Status: DISCONTINUED | OUTPATIENT
Start: 2025-08-18 | End: 2025-08-24 | Stop reason: HOSPADM

## 2025-08-18 RX ORDER — 0.9 % SODIUM CHLORIDE 0.9 %
1000 INTRAVENOUS SOLUTION INTRAVENOUS ONCE
Status: DISCONTINUED | OUTPATIENT
Start: 2025-08-18 | End: 2025-08-19

## 2025-08-18 RX ORDER — SODIUM CHLORIDE 9 MG/ML
INJECTION, SOLUTION INTRAVENOUS PRN
Status: DISCONTINUED | OUTPATIENT
Start: 2025-08-18 | End: 2025-08-24 | Stop reason: HOSPADM

## 2025-08-18 RX ORDER — ONDANSETRON 2 MG/ML
4 INJECTION INTRAMUSCULAR; INTRAVENOUS EVERY 6 HOURS PRN
Status: DISCONTINUED | OUTPATIENT
Start: 2025-08-18 | End: 2025-08-24 | Stop reason: HOSPADM

## 2025-08-18 RX ORDER — ACETAMINOPHEN 325 MG/1
650 TABLET ORAL EVERY 6 HOURS PRN
Status: DISCONTINUED | OUTPATIENT
Start: 2025-08-18 | End: 2025-08-24 | Stop reason: HOSPADM

## 2025-08-18 RX ORDER — POLYETHYLENE GLYCOL 3350 17 G/17G
17 POWDER, FOR SOLUTION ORAL DAILY PRN
Status: DISCONTINUED | OUTPATIENT
Start: 2025-08-18 | End: 2025-08-24 | Stop reason: HOSPADM

## 2025-08-18 RX ORDER — IOPAMIDOL 755 MG/ML
75 INJECTION, SOLUTION INTRAVASCULAR
Status: COMPLETED | OUTPATIENT
Start: 2025-08-18 | End: 2025-08-18

## 2025-08-18 RX ORDER — ONDANSETRON 4 MG/1
4 TABLET, ORALLY DISINTEGRATING ORAL EVERY 8 HOURS PRN
Status: DISCONTINUED | OUTPATIENT
Start: 2025-08-18 | End: 2025-08-24 | Stop reason: HOSPADM

## 2025-08-18 RX ORDER — SODIUM CHLORIDE 0.9 % (FLUSH) 0.9 %
5-40 SYRINGE (ML) INJECTION EVERY 12 HOURS SCHEDULED
Status: DISCONTINUED | OUTPATIENT
Start: 2025-08-18 | End: 2025-08-24 | Stop reason: HOSPADM

## 2025-08-18 RX ORDER — SODIUM CHLORIDE 0.9 % (FLUSH) 0.9 %
5-40 SYRINGE (ML) INJECTION PRN
Status: DISCONTINUED | OUTPATIENT
Start: 2025-08-18 | End: 2025-08-24 | Stop reason: HOSPADM

## 2025-08-18 RX ADMIN — IOPAMIDOL 75 ML: 755 INJECTION, SOLUTION INTRAVENOUS at 21:28

## 2025-08-18 ASSESSMENT — PAIN SCALES - GENERAL
PAINLEVEL_OUTOF10: 0
PAINLEVEL_OUTOF10: 0

## 2025-08-18 ASSESSMENT — PAIN - FUNCTIONAL ASSESSMENT: PAIN_FUNCTIONAL_ASSESSMENT: 0-10

## 2025-08-19 ENCOUNTER — ANESTHESIA EVENT (OUTPATIENT)
Dept: ENDOSCOPY | Age: 89
End: 2025-08-19
Payer: COMMERCIAL

## 2025-08-19 ENCOUNTER — ANESTHESIA (OUTPATIENT)
Dept: ENDOSCOPY | Age: 89
End: 2025-08-19
Payer: COMMERCIAL

## 2025-08-19 LAB
ABO/RH: NORMAL
ALBUMIN SERPL-MCNC: 2.5 G/DL (ref 3.4–5)
ANION GAP SERPL CALCULATED.3IONS-SCNC: 9 MMOL/L (ref 3–16)
ANTIBODY SCREEN: NORMAL
BASOPHILS # BLD: 0 K/UL (ref 0–0.2)
BASOPHILS # BLD: 0 K/UL (ref 0–0.2)
BASOPHILS NFR BLD: 0.3 %
BASOPHILS NFR BLD: 0.4 %
BLOOD BANK DISPENSE STATUS: NORMAL
BLOOD BANK PRODUCT CODE: NORMAL
BPU ID: NORMAL
BUN SERPL-MCNC: 14 MG/DL (ref 7–20)
CALCIUM SERPL-MCNC: 8.3 MG/DL (ref 8.3–10.6)
CHLORIDE SERPL-SCNC: 101 MMOL/L (ref 99–110)
CO2 SERPL-SCNC: 22 MMOL/L (ref 21–32)
CREAT SERPL-MCNC: 3 MG/DL (ref 0.6–1.2)
DEPRECATED RDW RBC AUTO: 16.2 % (ref 12.4–15.4)
DEPRECATED RDW RBC AUTO: 16.6 % (ref 12.4–15.4)
DESCRIPTION BLOOD BANK: NORMAL
EKG ATRIAL RATE: 71 BPM
EKG DIAGNOSIS: NORMAL
EKG P AXIS: 66 DEGREES
EKG Q-T INTERVAL: 440 MS
EKG QRS DURATION: 134 MS
EKG QTC CALCULATION (BAZETT): 478 MS
EKG R AXIS: -72 DEGREES
EKG T AXIS: 86 DEGREES
EKG VENTRICULAR RATE: 71 BPM
EOSINOPHIL # BLD: 0.1 K/UL (ref 0–0.6)
EOSINOPHIL # BLD: 0.1 K/UL (ref 0–0.6)
EOSINOPHIL NFR BLD: 1.4 %
EOSINOPHIL NFR BLD: 2.1 %
GFR SERPLBLD CREATININE-BSD FMLA CKD-EPI: 15 ML/MIN/{1.73_M2}
GLUCOSE SERPL-MCNC: 87 MG/DL (ref 70–99)
HCT VFR BLD AUTO: 17.8 % (ref 36–48)
HCT VFR BLD AUTO: 19.3 % (ref 36–48)
HCT VFR BLD AUTO: 24 % (ref 36–48)
HCT VFR BLD AUTO: 25.3 % (ref 36–48)
HGB BLD-MCNC: 6 G/DL (ref 12–16)
HGB BLD-MCNC: 6.4 G/DL (ref 12–16)
HGB BLD-MCNC: 8 G/DL (ref 12–16)
HGB BLD-MCNC: 8.7 G/DL (ref 12–16)
LYMPHOCYTES # BLD: 1.4 K/UL (ref 1–5.1)
LYMPHOCYTES # BLD: 1.7 K/UL (ref 1–5.1)
LYMPHOCYTES NFR BLD: 21.8 %
LYMPHOCYTES NFR BLD: 27.2 %
MAGNESIUM SERPL-MCNC: 1.75 MG/DL (ref 1.8–2.4)
MCH RBC QN AUTO: 31.1 PG (ref 26–34)
MCH RBC QN AUTO: 31.6 PG (ref 26–34)
MCHC RBC AUTO-ENTMCNC: 33.2 G/DL (ref 31–36)
MCHC RBC AUTO-ENTMCNC: 33.4 G/DL (ref 31–36)
MCV RBC AUTO: 93.4 FL (ref 80–100)
MCV RBC AUTO: 95 FL (ref 80–100)
MONOCYTES # BLD: 0.5 K/UL (ref 0–1.3)
MONOCYTES # BLD: 0.6 K/UL (ref 0–1.3)
MONOCYTES NFR BLD: 8.6 %
MONOCYTES NFR BLD: 9.2 %
NEUTROPHILS # BLD: 3.8 K/UL (ref 1.7–7.7)
NEUTROPHILS # BLD: 4.3 K/UL (ref 1.7–7.7)
NEUTROPHILS NFR BLD: 61.7 %
NEUTROPHILS NFR BLD: 67.3 %
PHOSPHATE SERPL-MCNC: 2.6 MG/DL (ref 2.5–4.9)
PLATELET # BLD AUTO: 69 K/UL (ref 135–450)
PLATELET # BLD AUTO: 85 K/UL (ref 135–450)
PMV BLD AUTO: 10.1 FL (ref 5–10.5)
PMV BLD AUTO: 9.7 FL (ref 5–10.5)
POTASSIUM SERPL-SCNC: 4.6 MMOL/L (ref 3.5–5.1)
RBC # BLD AUTO: 2.07 M/UL (ref 4–5.2)
RBC # BLD AUTO: 2.52 M/UL (ref 4–5.2)
SODIUM SERPL-SCNC: 132 MMOL/L (ref 136–145)
WBC # BLD AUTO: 6.2 K/UL (ref 4–11)
WBC # BLD AUTO: 6.3 K/UL (ref 4–11)

## 2025-08-19 PROCEDURE — 3700000000 HC ANESTHESIA ATTENDED CARE: Performed by: INTERNAL MEDICINE

## 2025-08-19 PROCEDURE — 85018 HEMOGLOBIN: CPT

## 2025-08-19 PROCEDURE — 6360000002 HC RX W HCPCS: Performed by: INTERNAL MEDICINE

## 2025-08-19 PROCEDURE — 30233R1 TRANSFUSION OF NONAUTOLOGOUS PLATELETS INTO PERIPHERAL VEIN, PERCUTANEOUS APPROACH: ICD-10-PCS | Performed by: INTERNAL MEDICINE

## 2025-08-19 PROCEDURE — 2580000003 HC RX 258

## 2025-08-19 PROCEDURE — 85014 HEMATOCRIT: CPT

## 2025-08-19 PROCEDURE — 2720000010 HC SURG SUPPLY STERILE: Performed by: INTERNAL MEDICINE

## 2025-08-19 PROCEDURE — 86901 BLOOD TYPING SEROLOGIC RH(D): CPT

## 2025-08-19 PROCEDURE — 6370000000 HC RX 637 (ALT 250 FOR IP): Performed by: INTERNAL MEDICINE

## 2025-08-19 PROCEDURE — 6360000002 HC RX W HCPCS: Performed by: STUDENT IN AN ORGANIZED HEALTH CARE EDUCATION/TRAINING PROGRAM

## 2025-08-19 PROCEDURE — 2500000003 HC RX 250 WO HCPCS: Performed by: INTERNAL MEDICINE

## 2025-08-19 PROCEDURE — 6360000002 HC RX W HCPCS: Performed by: NURSE ANESTHETIST, CERTIFIED REGISTERED

## 2025-08-19 PROCEDURE — 86900 BLOOD TYPING SEROLOGIC ABO: CPT

## 2025-08-19 PROCEDURE — 7100000011 HC PHASE II RECOVERY - ADDTL 15 MIN: Performed by: INTERNAL MEDICINE

## 2025-08-19 PROCEDURE — 85025 COMPLETE CBC W/AUTO DIFF WBC: CPT

## 2025-08-19 PROCEDURE — P9016 RBC LEUKOCYTES REDUCED: HCPCS

## 2025-08-19 PROCEDURE — 3609013000 HC EGD TRANSORAL CONTROL BLEEDING ANY METHOD: Performed by: INTERNAL MEDICINE

## 2025-08-19 PROCEDURE — 6370000000 HC RX 637 (ALT 250 FOR IP)

## 2025-08-19 PROCEDURE — 2580000003 HC RX 258: Performed by: STUDENT IN AN ORGANIZED HEALTH CARE EDUCATION/TRAINING PROGRAM

## 2025-08-19 PROCEDURE — 3E033XZ INTRODUCTION OF VASOPRESSOR INTO PERIPHERAL VEIN, PERCUTANEOUS APPROACH: ICD-10-PCS | Performed by: INTERNAL MEDICINE

## 2025-08-19 PROCEDURE — 76937 US GUIDE VASCULAR ACCESS: CPT

## 2025-08-19 PROCEDURE — 2709999900 HC NON-CHARGEABLE SUPPLY: Performed by: INTERNAL MEDICINE

## 2025-08-19 PROCEDURE — 2060000000 HC ICU INTERMEDIATE R&B

## 2025-08-19 PROCEDURE — 0W3P8ZZ CONTROL BLEEDING IN GASTROINTESTINAL TRACT, VIA NATURAL OR ARTIFICIAL OPENING ENDOSCOPIC: ICD-10-PCS | Performed by: INTERNAL MEDICINE

## 2025-08-19 PROCEDURE — 86850 RBC ANTIBODY SCREEN: CPT

## 2025-08-19 PROCEDURE — 2500000003 HC RX 250 WO HCPCS: Performed by: STUDENT IN AN ORGANIZED HEALTH CARE EDUCATION/TRAINING PROGRAM

## 2025-08-19 PROCEDURE — 36430 TRANSFUSION BLD/BLD COMPNT: CPT

## 2025-08-19 PROCEDURE — 86923 COMPATIBILITY TEST ELECTRIC: CPT

## 2025-08-19 PROCEDURE — 7100000010 HC PHASE II RECOVERY - FIRST 15 MIN: Performed by: INTERNAL MEDICINE

## 2025-08-19 PROCEDURE — 36415 COLL VENOUS BLD VENIPUNCTURE: CPT

## 2025-08-19 PROCEDURE — 30233N1 TRANSFUSION OF NONAUTOLOGOUS RED BLOOD CELLS INTO PERIPHERAL VEIN, PERCUTANEOUS APPROACH: ICD-10-PCS | Performed by: INTERNAL MEDICINE

## 2025-08-19 PROCEDURE — 80069 RENAL FUNCTION PANEL: CPT

## 2025-08-19 PROCEDURE — 3700000001 HC ADD 15 MINUTES (ANESTHESIA): Performed by: INTERNAL MEDICINE

## 2025-08-19 PROCEDURE — 83735 ASSAY OF MAGNESIUM: CPT

## 2025-08-19 PROCEDURE — 6370000000 HC RX 637 (ALT 250 FOR IP): Performed by: STUDENT IN AN ORGANIZED HEALTH CARE EDUCATION/TRAINING PROGRAM

## 2025-08-19 RX ORDER — 0.9 % SODIUM CHLORIDE 0.9 %
500 INTRAVENOUS SOLUTION INTRAVENOUS ONCE
Status: DISCONTINUED | OUTPATIENT
Start: 2025-08-19 | End: 2025-08-24 | Stop reason: HOSPADM

## 2025-08-19 RX ORDER — 0.9 % SODIUM CHLORIDE 0.9 %
1000 INTRAVENOUS SOLUTION INTRAVENOUS ONCE
Status: DISCONTINUED | OUTPATIENT
Start: 2025-08-19 | End: 2025-08-19

## 2025-08-19 RX ORDER — DONEPEZIL HYDROCHLORIDE 5 MG/1
5 TABLET, FILM COATED ORAL DAILY
Status: DISCONTINUED | OUTPATIENT
Start: 2025-08-19 | End: 2025-08-24 | Stop reason: HOSPADM

## 2025-08-19 RX ORDER — ALBUMIN (HUMAN) 12.5 G/50ML
25 SOLUTION INTRAVENOUS
Status: DISCONTINUED | OUTPATIENT
Start: 2025-08-19 | End: 2025-08-24 | Stop reason: HOSPADM

## 2025-08-19 RX ORDER — MIDODRINE HYDROCHLORIDE 5 MG/1
5 TABLET ORAL EVERY 8 HOURS
Status: DISCONTINUED | OUTPATIENT
Start: 2025-08-19 | End: 2025-08-24 | Stop reason: HOSPADM

## 2025-08-19 RX ORDER — FOLIC ACID 1 MG/1
1 TABLET ORAL DAILY
Status: DISCONTINUED | OUTPATIENT
Start: 2025-08-19 | End: 2025-08-19

## 2025-08-19 RX ORDER — ATORVASTATIN CALCIUM 10 MG/1
10 TABLET, FILM COATED ORAL
Status: DISCONTINUED | OUTPATIENT
Start: 2025-08-19 | End: 2025-08-24 | Stop reason: HOSPADM

## 2025-08-19 RX ORDER — PROPOFOL 10 MG/ML
INJECTION, EMULSION INTRAVENOUS
Status: DISCONTINUED | OUTPATIENT
Start: 2025-08-19 | End: 2025-08-19 | Stop reason: SDUPTHER

## 2025-08-19 RX ORDER — 0.9 % SODIUM CHLORIDE 0.9 %
100 INTRAVENOUS SOLUTION INTRAVENOUS PRN
Status: DISCONTINUED | OUTPATIENT
Start: 2025-08-19 | End: 2025-08-24 | Stop reason: HOSPADM

## 2025-08-19 RX ORDER — QUETIAPINE FUMARATE 25 MG/1
50 TABLET, FILM COATED ORAL 2 TIMES DAILY
Status: DISCONTINUED | OUTPATIENT
Start: 2025-08-19 | End: 2025-08-24 | Stop reason: HOSPADM

## 2025-08-19 RX ORDER — SODIUM CHLORIDE 9 MG/ML
INJECTION, SOLUTION INTRAVENOUS PRN
Status: COMPLETED | OUTPATIENT
Start: 2025-08-19 | End: 2025-08-19

## 2025-08-19 RX ORDER — LATANOPROST 50 UG/ML
1 SOLUTION/ DROPS OPHTHALMIC NIGHTLY
Status: DISCONTINUED | OUTPATIENT
Start: 2025-08-19 | End: 2025-08-24 | Stop reason: HOSPADM

## 2025-08-19 RX ORDER — SODIUM CHLORIDE, SODIUM LACTATE, POTASSIUM CHLORIDE, CALCIUM CHLORIDE 600; 310; 30; 20 MG/100ML; MG/100ML; MG/100ML; MG/100ML
INJECTION, SOLUTION INTRAVENOUS CONTINUOUS
Status: DISCONTINUED | OUTPATIENT
Start: 2025-08-19 | End: 2025-08-19

## 2025-08-19 RX ORDER — MIDODRINE HYDROCHLORIDE 5 MG/1
10 TABLET ORAL ONCE
Status: COMPLETED | OUTPATIENT
Start: 2025-08-19 | End: 2025-08-19

## 2025-08-19 RX ORDER — SODIUM CHLORIDE 9 MG/ML
INJECTION, SOLUTION INTRAVENOUS PRN
Status: DISCONTINUED | OUTPATIENT
Start: 2025-08-19 | End: 2025-08-24 | Stop reason: HOSPADM

## 2025-08-19 RX ORDER — NEPHROCAP 1 MG
1 CAP ORAL DAILY
Status: DISCONTINUED | OUTPATIENT
Start: 2025-08-19 | End: 2025-08-24 | Stop reason: HOSPADM

## 2025-08-19 RX ORDER — SUCRALFATE ORAL 1 G/10ML
1 SUSPENSION ORAL
Status: DISCONTINUED | OUTPATIENT
Start: 2025-08-19 | End: 2025-08-24 | Stop reason: HOSPADM

## 2025-08-19 RX ORDER — ESCITALOPRAM OXALATE 10 MG/1
10 TABLET ORAL DAILY
Status: DISCONTINUED | OUTPATIENT
Start: 2025-08-19 | End: 2025-08-24 | Stop reason: HOSPADM

## 2025-08-19 RX ORDER — LIDOCAINE 4 G/G
1 PATCH TOPICAL DAILY
Status: DISCONTINUED | OUTPATIENT
Start: 2025-08-19 | End: 2025-08-24 | Stop reason: HOSPADM

## 2025-08-19 RX ADMIN — PROPOFOL 50 MG: 10 INJECTION, EMULSION INTRAVENOUS at 14:51

## 2025-08-19 RX ADMIN — SODIUM CHLORIDE, PRESERVATIVE FREE 40 MG: 5 INJECTION INTRAVENOUS at 23:50

## 2025-08-19 RX ADMIN — SODIUM CHLORIDE, PRESERVATIVE FREE 40 MG: 5 INJECTION INTRAVENOUS at 13:52

## 2025-08-19 RX ADMIN — SODIUM CHLORIDE, PRESERVATIVE FREE 40 MG: 5 INJECTION INTRAVENOUS at 00:13

## 2025-08-19 RX ADMIN — SODIUM CHLORIDE, PRESERVATIVE FREE 10 ML: 5 INJECTION INTRAVENOUS at 20:37

## 2025-08-19 RX ADMIN — SODIUM CHLORIDE, PRESERVATIVE FREE 10 ML: 5 INJECTION INTRAVENOUS at 00:14

## 2025-08-19 RX ADMIN — SODIUM CHLORIDE: 9 INJECTION, SOLUTION INTRAVENOUS at 14:43

## 2025-08-19 RX ADMIN — SODIUM CHLORIDE 1000 ML: 0.9 INJECTION, SOLUTION INTRAVENOUS at 10:40

## 2025-08-19 RX ADMIN — PROPOFOL 20 MG: 10 INJECTION, EMULSION INTRAVENOUS at 14:57

## 2025-08-19 RX ADMIN — ACETAMINOPHEN 650 MG: 325 TABLET ORAL at 00:44

## 2025-08-19 RX ADMIN — LATANOPROST 1 DROP: 50 SOLUTION OPHTHALMIC at 20:37

## 2025-08-19 RX ADMIN — MIDODRINE HYDROCHLORIDE 10 MG: 5 TABLET ORAL at 19:01

## 2025-08-19 RX ADMIN — SODIUM CHLORIDE, SODIUM LACTATE, POTASSIUM CHLORIDE, AND CALCIUM CHLORIDE: .6; .31; .03; .02 INJECTION, SOLUTION INTRAVENOUS at 05:39

## 2025-08-19 ASSESSMENT — PAIN DESCRIPTION - LOCATION
LOCATION: BACK
LOCATION: BACK

## 2025-08-19 ASSESSMENT — PAIN SCALES - GENERAL
PAINLEVEL_OUTOF10: 0

## 2025-08-19 ASSESSMENT — PAIN DESCRIPTION - FREQUENCY
FREQUENCY: INTERMITTENT
FREQUENCY: INTERMITTENT

## 2025-08-19 ASSESSMENT — PAIN - FUNCTIONAL ASSESSMENT
PAIN_FUNCTIONAL_ASSESSMENT: ACTIVITIES ARE NOT PREVENTED
PAIN_FUNCTIONAL_ASSESSMENT: WONG-BAKER FACES

## 2025-08-19 ASSESSMENT — PAIN SCALES - WONG BAKER
WONGBAKER_NUMERICALRESPONSE: NO HURT
WONGBAKER_NUMERICALRESPONSE: NO HURT

## 2025-08-19 ASSESSMENT — PAIN DESCRIPTION - PAIN TYPE
TYPE: CHRONIC PAIN
TYPE: CHRONIC PAIN

## 2025-08-19 ASSESSMENT — PAIN DESCRIPTION - DESCRIPTORS
DESCRIPTORS: ACHING
DESCRIPTORS: ACHING

## 2025-08-19 ASSESSMENT — PAIN DESCRIPTION - ORIENTATION
ORIENTATION: MID
ORIENTATION: MID

## 2025-08-19 ASSESSMENT — PAIN DESCRIPTION - ONSET
ONSET: PROGRESSIVE
ONSET: PROGRESSIVE

## 2025-08-20 LAB
ALBUMIN SERPL-MCNC: 2.5 G/DL (ref 3.4–5)
ANION GAP SERPL CALCULATED.3IONS-SCNC: 9 MMOL/L (ref 3–16)
BASOPHILS # BLD: 0 K/UL (ref 0–0.2)
BASOPHILS NFR BLD: 0.5 %
BLOOD BANK DISPENSE STATUS: NORMAL
BLOOD BANK PRODUCT CODE: NORMAL
BPU ID: NORMAL
BUN SERPL-MCNC: 29 MG/DL (ref 7–20)
CALCIUM SERPL-MCNC: 8.8 MG/DL (ref 8.3–10.6)
CHLORIDE SERPL-SCNC: 105 MMOL/L (ref 99–110)
CO2 SERPL-SCNC: 22 MMOL/L (ref 21–32)
CREAT SERPL-MCNC: 4.2 MG/DL (ref 0.6–1.2)
DEPRECATED RDW RBC AUTO: 16.9 % (ref 12.4–15.4)
DESCRIPTION BLOOD BANK: NORMAL
EOSINOPHIL # BLD: 0.1 K/UL (ref 0–0.6)
EOSINOPHIL NFR BLD: 0.8 %
GFR SERPLBLD CREATININE-BSD FMLA CKD-EPI: 10 ML/MIN/{1.73_M2}
GLUCOSE SERPL-MCNC: 75 MG/DL (ref 70–99)
HCT VFR BLD AUTO: 19.5 % (ref 36–48)
HCT VFR BLD AUTO: 20.3 % (ref 36–48)
HCT VFR BLD AUTO: 24 % (ref 36–48)
HCT VFR BLD AUTO: 26.6 % (ref 36–48)
HGB BLD-MCNC: 6.8 G/DL (ref 12–16)
HGB BLD-MCNC: 7.1 G/DL (ref 12–16)
HGB BLD-MCNC: 8.3 G/DL (ref 12–16)
HGB BLD-MCNC: 9.1 G/DL (ref 12–16)
LYMPHOCYTES # BLD: 1.8 K/UL (ref 1–5.1)
LYMPHOCYTES NFR BLD: 21.2 %
MAGNESIUM SERPL-MCNC: 1.81 MG/DL (ref 1.8–2.4)
MCH RBC QN AUTO: 31.7 PG (ref 26–34)
MCHC RBC AUTO-ENTMCNC: 35 G/DL (ref 31–36)
MCV RBC AUTO: 90.6 FL (ref 80–100)
MONOCYTES # BLD: 0.5 K/UL (ref 0–1.3)
MONOCYTES NFR BLD: 6.4 %
NEUTROPHILS # BLD: 5.9 K/UL (ref 1.7–7.7)
NEUTROPHILS NFR BLD: 71.1 %
PHOSPHATE SERPL-MCNC: 3.8 MG/DL (ref 2.5–4.9)
PLATELET # BLD AUTO: 88 K/UL (ref 135–450)
PMV BLD AUTO: 10.3 FL (ref 5–10.5)
POTASSIUM SERPL-SCNC: 4.8 MMOL/L (ref 3.5–5.1)
RBC # BLD AUTO: 2.24 M/UL (ref 4–5.2)
SODIUM SERPL-SCNC: 136 MMOL/L (ref 136–145)
WBC # BLD AUTO: 8.3 K/UL (ref 4–11)

## 2025-08-20 PROCEDURE — 5A1D70Z PERFORMANCE OF URINARY FILTRATION, INTERMITTENT, LESS THAN 6 HOURS PER DAY: ICD-10-PCS | Performed by: INTERNAL MEDICINE

## 2025-08-20 PROCEDURE — 6360000002 HC RX W HCPCS: Performed by: INTERNAL MEDICINE

## 2025-08-20 PROCEDURE — 90935 HEMODIALYSIS ONE EVALUATION: CPT

## 2025-08-20 PROCEDURE — 80069 RENAL FUNCTION PANEL: CPT

## 2025-08-20 PROCEDURE — 6360000002 HC RX W HCPCS

## 2025-08-20 PROCEDURE — 85014 HEMATOCRIT: CPT

## 2025-08-20 PROCEDURE — 2060000000 HC ICU INTERMEDIATE R&B

## 2025-08-20 PROCEDURE — 85025 COMPLETE CBC W/AUTO DIFF WBC: CPT

## 2025-08-20 PROCEDURE — 36430 TRANSFUSION BLD/BLD COMPNT: CPT

## 2025-08-20 PROCEDURE — 2500000003 HC RX 250 WO HCPCS: Performed by: INTERNAL MEDICINE

## 2025-08-20 PROCEDURE — 83735 ASSAY OF MAGNESIUM: CPT

## 2025-08-20 PROCEDURE — 6370000000 HC RX 637 (ALT 250 FOR IP)

## 2025-08-20 PROCEDURE — 36415 COLL VENOUS BLD VENIPUNCTURE: CPT

## 2025-08-20 PROCEDURE — 85018 HEMOGLOBIN: CPT

## 2025-08-20 RX ORDER — MIDODRINE HYDROCHLORIDE 5 MG/1
10 TABLET ORAL PRN
Status: DISCONTINUED | OUTPATIENT
Start: 2025-08-20 | End: 2025-08-24 | Stop reason: HOSPADM

## 2025-08-20 RX ORDER — HEPARIN SODIUM 1000 [USP'U]/ML
3200 INJECTION, SOLUTION INTRAVENOUS; SUBCUTANEOUS PRN
Status: DISCONTINUED | OUTPATIENT
Start: 2025-08-20 | End: 2025-08-24 | Stop reason: HOSPADM

## 2025-08-20 RX ORDER — HYDROMORPHONE HYDROCHLORIDE 1 MG/ML
0.25 INJECTION, SOLUTION INTRAMUSCULAR; INTRAVENOUS; SUBCUTANEOUS EVERY 6 HOURS PRN
Status: DISCONTINUED | OUTPATIENT
Start: 2025-08-20 | End: 2025-08-24 | Stop reason: HOSPADM

## 2025-08-20 RX ORDER — SODIUM CHLORIDE 9 MG/ML
INJECTION, SOLUTION INTRAVENOUS PRN
Status: DISCONTINUED | OUTPATIENT
Start: 2025-08-20 | End: 2025-08-21

## 2025-08-20 RX ORDER — HYDROMORPHONE HYDROCHLORIDE 1 MG/ML
0.25 INJECTION, SOLUTION INTRAMUSCULAR; INTRAVENOUS; SUBCUTANEOUS EVERY 8 HOURS PRN
Status: DISCONTINUED | OUTPATIENT
Start: 2025-08-20 | End: 2025-08-20

## 2025-08-20 RX ADMIN — SODIUM CHLORIDE, PRESERVATIVE FREE 10 ML: 5 INJECTION INTRAVENOUS at 20:25

## 2025-08-20 RX ADMIN — ATORVASTATIN CALCIUM 10 MG: 10 TABLET, FILM COATED ORAL at 20:25

## 2025-08-20 RX ADMIN — IRON SUCROSE 100 MG: 20 INJECTION, SOLUTION INTRAVENOUS at 10:41

## 2025-08-20 RX ADMIN — SODIUM CHLORIDE, PRESERVATIVE FREE 40 MG: 5 INJECTION INTRAVENOUS at 23:21

## 2025-08-20 RX ADMIN — SODIUM CHLORIDE, PRESERVATIVE FREE 40 MG: 5 INJECTION INTRAVENOUS at 14:36

## 2025-08-20 RX ADMIN — HYDROMORPHONE HYDROCHLORIDE 0.25 MG: 1 INJECTION, SOLUTION INTRAMUSCULAR; INTRAVENOUS; SUBCUTANEOUS at 14:36

## 2025-08-20 RX ADMIN — SUCRALFATE ORAL SUSPENSION 1 G: 1 SUSPENSION ORAL at 20:57

## 2025-08-20 RX ADMIN — EPOETIN ALFA-EPBX 10000 UNITS: 10000 INJECTION, SOLUTION INTRAVENOUS; SUBCUTANEOUS at 09:24

## 2025-08-20 RX ADMIN — LATANOPROST 1 DROP: 50 SOLUTION OPHTHALMIC at 20:25

## 2025-08-20 RX ADMIN — Medication 1 TABLET: at 20:25

## 2025-08-20 ASSESSMENT — PAIN SCALES - PAIN ASSESSMENT IN ADVANCED DEMENTIA (PAINAD)
CONSOLABILITY: UNABLE TO CONSOLE, DISTRACT OR REASSURE
BREATHING: NORMAL
TOTALSCORE: 6
CONSOLABILITY: NO NEED TO CONSOLE
BODYLANGUAGE: TENSE, DISTRESSED PACING, FIDGETING
FACIALEXPRESSION: SAD, FRIGHTENED, FROWN
NEGVOCALIZATION: REPEATED TROUBLED CALLING OUT, LOUD MOANING/GROANING, CRYING
FACIALEXPRESSION: SMILING OR INEXPRESSIVE
TOTALSCORE: 0
BODYLANGUAGE: RELAXED

## 2025-08-20 ASSESSMENT — PAIN SCALES - GENERAL
PAINLEVEL_OUTOF10: 0

## 2025-08-20 ASSESSMENT — PAIN - FUNCTIONAL ASSESSMENT: PAIN_FUNCTIONAL_ASSESSMENT: PAIN ASSESSMENT IN ADVANCED DEMENTIA (PAINAD)

## 2025-08-21 LAB
ALBUMIN SERPL-MCNC: 2.7 G/DL (ref 3.4–5)
ANION GAP SERPL CALCULATED.3IONS-SCNC: 9 MMOL/L (ref 3–16)
BASOPHILS # BLD: 0 K/UL (ref 0–0.2)
BASOPHILS NFR BLD: 0.5 %
BUN SERPL-MCNC: 12 MG/DL (ref 7–20)
CALCIUM SERPL-MCNC: 8.6 MG/DL (ref 8.3–10.6)
CHLORIDE SERPL-SCNC: 101 MMOL/L (ref 99–110)
CO2 SERPL-SCNC: 25 MMOL/L (ref 21–32)
CREAT SERPL-MCNC: 2.8 MG/DL (ref 0.6–1.2)
DEPRECATED RDW RBC AUTO: 16.5 % (ref 12.4–15.4)
EOSINOPHIL # BLD: 0.1 K/UL (ref 0–0.6)
EOSINOPHIL NFR BLD: 2 %
FIBRINOGEN PPP-MCNC: 156 MG/DL (ref 220–516)
GFR SERPLBLD CREATININE-BSD FMLA CKD-EPI: 16 ML/MIN/{1.73_M2}
GLUCOSE SERPL-MCNC: 63 MG/DL (ref 70–99)
HCT VFR BLD AUTO: 23.3 % (ref 36–48)
HCT VFR BLD AUTO: 23.4 % (ref 36–48)
HCT VFR BLD AUTO: 24 % (ref 36–48)
HCT VFR BLD AUTO: 24.7 % (ref 36–48)
HGB BLD-MCNC: 7.9 G/DL (ref 12–16)
HGB BLD-MCNC: 8.3 G/DL (ref 12–16)
HGB BLD-MCNC: 8.6 G/DL (ref 12–16)
IMM RETICS NFR: 0.41 % (ref 0.21–0.37)
LDH SERPL L TO P-CCNC: 218 U/L (ref 100–190)
LYMPHOCYTES # BLD: 1.8 K/UL (ref 1–5.1)
LYMPHOCYTES NFR BLD: 26.6 %
MAGNESIUM SERPL-MCNC: 1.71 MG/DL (ref 1.8–2.4)
MCH RBC QN AUTO: 31.4 PG (ref 26–34)
MCHC RBC AUTO-ENTMCNC: 34.7 G/DL (ref 31–36)
MCV RBC AUTO: 90.3 FL (ref 80–100)
MONOCYTES # BLD: 0.7 K/UL (ref 0–1.3)
MONOCYTES NFR BLD: 10 %
NEUTROPHILS # BLD: 4.1 K/UL (ref 1.7–7.7)
NEUTROPHILS NFR BLD: 60.9 %
PATH INTERP BLD-IMP: NORMAL
PHOSPHATE SERPL-MCNC: 2.7 MG/DL (ref 2.5–4.9)
PLATELET # BLD AUTO: 70 K/UL (ref 135–450)
PMV BLD AUTO: 10.2 FL (ref 5–10.5)
POTASSIUM SERPL-SCNC: 3.7 MMOL/L (ref 3.5–5.1)
RBC # BLD AUTO: 2.66 M/UL (ref 4–5.2)
RETICS # AUTO: 0.06 M/UL (ref 0.02–0.1)
RETICS/RBC NFR AUTO: 2.22 % (ref 0.5–2.18)
SODIUM SERPL-SCNC: 135 MMOL/L (ref 136–145)
WBC # BLD AUTO: 6.7 K/UL (ref 4–11)

## 2025-08-21 PROCEDURE — 86701 HIV-1ANTIBODY: CPT

## 2025-08-21 PROCEDURE — 82668 ASSAY OF ERYTHROPOIETIN: CPT

## 2025-08-21 PROCEDURE — 87390 HIV-1 AG IA: CPT

## 2025-08-21 PROCEDURE — 85014 HEMATOCRIT: CPT

## 2025-08-21 PROCEDURE — 82525 ASSAY OF COPPER: CPT

## 2025-08-21 PROCEDURE — 86702 HIV-2 ANTIBODY: CPT

## 2025-08-21 PROCEDURE — 6370000000 HC RX 637 (ALT 250 FOR IP)

## 2025-08-21 PROCEDURE — 85045 AUTOMATED RETICULOCYTE COUNT: CPT

## 2025-08-21 PROCEDURE — 80069 RENAL FUNCTION PANEL: CPT

## 2025-08-21 PROCEDURE — 2500000003 HC RX 250 WO HCPCS: Performed by: INTERNAL MEDICINE

## 2025-08-21 PROCEDURE — 83735 ASSAY OF MAGNESIUM: CPT

## 2025-08-21 PROCEDURE — 83550 IRON BINDING TEST: CPT

## 2025-08-21 PROCEDURE — 6370000000 HC RX 637 (ALT 250 FOR IP): Performed by: INTERNAL MEDICINE

## 2025-08-21 PROCEDURE — 85025 COMPLETE CBC W/AUTO DIFF WBC: CPT

## 2025-08-21 PROCEDURE — 6360000002 HC RX W HCPCS: Performed by: INTERNAL MEDICINE

## 2025-08-21 PROCEDURE — 83615 LACTATE (LD) (LDH) ENZYME: CPT

## 2025-08-21 PROCEDURE — 2060000000 HC ICU INTERMEDIATE R&B

## 2025-08-21 PROCEDURE — 85384 FIBRINOGEN ACTIVITY: CPT

## 2025-08-21 PROCEDURE — 83540 ASSAY OF IRON: CPT

## 2025-08-21 PROCEDURE — 6360000002 HC RX W HCPCS

## 2025-08-21 PROCEDURE — 85018 HEMOGLOBIN: CPT

## 2025-08-21 PROCEDURE — 83010 ASSAY OF HAPTOGLOBIN QUANT: CPT

## 2025-08-21 PROCEDURE — 36415 COLL VENOUS BLD VENIPUNCTURE: CPT

## 2025-08-21 RX ORDER — MAGNESIUM SULFATE 1 G/100ML
1000 INJECTION INTRAVENOUS ONCE
Status: COMPLETED | OUTPATIENT
Start: 2025-08-21 | End: 2025-08-21

## 2025-08-21 RX ADMIN — SODIUM CHLORIDE, PRESERVATIVE FREE 10 ML: 5 INJECTION INTRAVENOUS at 20:38

## 2025-08-21 RX ADMIN — MIDODRINE HYDROCHLORIDE 5 MG: 5 TABLET ORAL at 08:22

## 2025-08-21 RX ADMIN — ESCITALOPRAM OXALATE 10 MG: 10 TABLET ORAL at 08:14

## 2025-08-21 RX ADMIN — LATANOPROST 1 DROP: 50 SOLUTION OPHTHALMIC at 20:38

## 2025-08-21 RX ADMIN — ATORVASTATIN CALCIUM 10 MG: 10 TABLET, FILM COATED ORAL at 20:38

## 2025-08-21 RX ADMIN — DONEPEZIL HYDROCHLORIDE 5 MG: 5 TABLET, FILM COATED ORAL at 08:14

## 2025-08-21 RX ADMIN — SODIUM CHLORIDE, PRESERVATIVE FREE 40 MG: 5 INJECTION INTRAVENOUS at 10:52

## 2025-08-21 RX ADMIN — MIDODRINE HYDROCHLORIDE 5 MG: 5 TABLET ORAL at 18:26

## 2025-08-21 RX ADMIN — MAGNESIUM SULFATE HEPTAHYDRATE 1000 MG: 1 INJECTION, SOLUTION INTRAVENOUS at 10:56

## 2025-08-21 RX ADMIN — QUETIAPINE FUMARATE 50 MG: 25 TABLET ORAL at 20:38

## 2025-08-21 RX ADMIN — SODIUM CHLORIDE, PRESERVATIVE FREE 10 ML: 5 INJECTION INTRAVENOUS at 08:14

## 2025-08-21 RX ADMIN — MIDODRINE HYDROCHLORIDE 5 MG: 5 TABLET ORAL at 02:02

## 2025-08-21 RX ADMIN — SUCRALFATE ORAL SUSPENSION 1 G: 1 SUSPENSION ORAL at 18:27

## 2025-08-21 RX ADMIN — QUETIAPINE FUMARATE 50 MG: 25 TABLET ORAL at 08:14

## 2025-08-21 RX ADMIN — Medication 1 TABLET: at 08:13

## 2025-08-21 RX ADMIN — Medication 1 TABLET: at 20:38

## 2025-08-21 RX ADMIN — SUCRALFATE ORAL SUSPENSION 1 G: 1 SUSPENSION ORAL at 10:52

## 2025-08-21 RX ADMIN — SUCRALFATE ORAL SUSPENSION 1 G: 1 SUSPENSION ORAL at 06:05

## 2025-08-21 RX ADMIN — Medication 1 MG: at 08:14

## 2025-08-21 RX ADMIN — SUCRALFATE ORAL SUSPENSION 1 G: 1 SUSPENSION ORAL at 20:38

## 2025-08-21 ASSESSMENT — PAIN SCALES - GENERAL
PAINLEVEL_OUTOF10: 0

## 2025-08-22 LAB
ALBUMIN SERPL-MCNC: 2.7 G/DL (ref 3.4–5)
ALP SERPL-CCNC: 87 U/L (ref 40–129)
ALT SERPL-CCNC: 34 U/L (ref 10–40)
ANION GAP SERPL CALCULATED.3IONS-SCNC: 8 MMOL/L (ref 3–16)
AST SERPL-CCNC: 25 U/L (ref 15–37)
BILIRUB DIRECT SERPL-MCNC: 0.3 MG/DL (ref 0–0.3)
BILIRUB INDIRECT SERPL-MCNC: 0.2 MG/DL (ref 0–1)
BILIRUB SERPL-MCNC: 0.5 MG/DL (ref 0–1)
BUN SERPL-MCNC: 14 MG/DL (ref 7–20)
CALCIUM SERPL-MCNC: 8.8 MG/DL (ref 8.3–10.6)
CHLORIDE SERPL-SCNC: 101 MMOL/L (ref 99–110)
CO2 SERPL-SCNC: 24 MMOL/L (ref 21–32)
CREAT SERPL-MCNC: 4.2 MG/DL (ref 0.6–1.2)
GFR SERPLBLD CREATININE-BSD FMLA CKD-EPI: 10 ML/MIN/{1.73_M2}
GLUCOSE BLD-MCNC: 104 MG/DL (ref 70–99)
GLUCOSE SERPL-MCNC: 84 MG/DL (ref 70–99)
HCT VFR BLD AUTO: 27 % (ref 36–48)
HGB BLD-MCNC: 9.1 G/DL (ref 12–16)
HIV 1+2 AB+HIV1 P24 AG SERPL QL IA: NORMAL
HIV 2 AB SERPL QL IA: NORMAL
HIV1 AB SERPL QL IA: NORMAL
HIV1 P24 AG SERPL QL IA: NORMAL
MAGNESIUM SERPL-MCNC: 2.11 MG/DL (ref 1.8–2.4)
PERFORMED ON: ABNORMAL
PHOSPHATE SERPL-MCNC: 2.8 MG/DL (ref 2.5–4.9)
POTASSIUM SERPL-SCNC: 3.7 MMOL/L (ref 3.5–5.1)
PROT SERPL-MCNC: 4 G/DL (ref 6.4–8.2)
SODIUM SERPL-SCNC: 133 MMOL/L (ref 136–145)

## 2025-08-22 PROCEDURE — 80076 HEPATIC FUNCTION PANEL: CPT

## 2025-08-22 PROCEDURE — 85014 HEMATOCRIT: CPT

## 2025-08-22 PROCEDURE — 6370000000 HC RX 637 (ALT 250 FOR IP)

## 2025-08-22 PROCEDURE — 85018 HEMOGLOBIN: CPT

## 2025-08-22 PROCEDURE — 80069 RENAL FUNCTION PANEL: CPT

## 2025-08-22 PROCEDURE — 6360000002 HC RX W HCPCS: Performed by: INTERNAL MEDICINE

## 2025-08-22 PROCEDURE — 6360000002 HC RX W HCPCS

## 2025-08-22 PROCEDURE — 36415 COLL VENOUS BLD VENIPUNCTURE: CPT

## 2025-08-22 PROCEDURE — 2500000003 HC RX 250 WO HCPCS: Performed by: INTERNAL MEDICINE

## 2025-08-22 PROCEDURE — 2060000000 HC ICU INTERMEDIATE R&B

## 2025-08-22 PROCEDURE — 90935 HEMODIALYSIS ONE EVALUATION: CPT

## 2025-08-22 PROCEDURE — 83735 ASSAY OF MAGNESIUM: CPT

## 2025-08-22 RX ADMIN — SUCRALFATE ORAL SUSPENSION 1 G: 1 SUSPENSION ORAL at 21:37

## 2025-08-22 RX ADMIN — MIDODRINE HYDROCHLORIDE 5 MG: 5 TABLET ORAL at 00:19

## 2025-08-22 RX ADMIN — SUCRALFATE ORAL SUSPENSION 1 G: 1 SUSPENSION ORAL at 05:54

## 2025-08-22 RX ADMIN — SODIUM CHLORIDE, PRESERVATIVE FREE 10 ML: 5 INJECTION INTRAVENOUS at 09:46

## 2025-08-22 RX ADMIN — SODIUM CHLORIDE, PRESERVATIVE FREE 40 MG: 5 INJECTION INTRAVENOUS at 23:30

## 2025-08-22 RX ADMIN — QUETIAPINE FUMARATE 50 MG: 25 TABLET ORAL at 21:24

## 2025-08-22 RX ADMIN — Medication 1 TABLET: at 21:24

## 2025-08-22 RX ADMIN — SODIUM CHLORIDE, PRESERVATIVE FREE 10 ML: 5 INJECTION INTRAVENOUS at 21:24

## 2025-08-22 RX ADMIN — ATORVASTATIN CALCIUM 10 MG: 10 TABLET, FILM COATED ORAL at 21:24

## 2025-08-22 RX ADMIN — SODIUM CHLORIDE, PRESERVATIVE FREE 40 MG: 5 INJECTION INTRAVENOUS at 13:44

## 2025-08-22 RX ADMIN — MIDODRINE HYDROCHLORIDE 5 MG: 5 TABLET ORAL at 18:02

## 2025-08-22 RX ADMIN — SUCRALFATE ORAL SUSPENSION 1 G: 1 SUSPENSION ORAL at 18:02

## 2025-08-22 RX ADMIN — SUCRALFATE ORAL SUSPENSION 1 G: 1 SUSPENSION ORAL at 13:43

## 2025-08-22 RX ADMIN — EPOETIN ALFA-EPBX 10000 UNITS: 10000 INJECTION, SOLUTION INTRAVENOUS; SUBCUTANEOUS at 10:35

## 2025-08-22 RX ADMIN — LATANOPROST 1 DROP: 50 SOLUTION OPHTHALMIC at 21:24

## 2025-08-22 RX ADMIN — SODIUM CHLORIDE, PRESERVATIVE FREE 40 MG: 5 INJECTION INTRAVENOUS at 00:19

## 2025-08-22 ASSESSMENT — PAIN SCALES - GENERAL
PAINLEVEL_OUTOF10: 0

## 2025-08-23 LAB
ALBUMIN SERPL-MCNC: 2.5 G/DL (ref 3.4–5)
ANION GAP SERPL CALCULATED.3IONS-SCNC: 7 MMOL/L (ref 3–16)
BASOPHILS # BLD: 0 K/UL (ref 0–0.2)
BASOPHILS NFR BLD: 0.4 %
BLOOD BANK DISPENSE STATUS: NORMAL
BLOOD BANK PRODUCT CODE: NORMAL
BPU ID: NORMAL
BUN SERPL-MCNC: 7 MG/DL (ref 7–20)
CALCIUM SERPL-MCNC: 8.2 MG/DL (ref 8.3–10.6)
CHLORIDE SERPL-SCNC: 100 MMOL/L (ref 99–110)
CO2 SERPL-SCNC: 27 MMOL/L (ref 21–32)
CREAT SERPL-MCNC: 2.9 MG/DL (ref 0.6–1.2)
DEPRECATED RDW RBC AUTO: 15.9 % (ref 12.4–15.4)
DESCRIPTION BLOOD BANK: NORMAL
EOSINOPHIL # BLD: 0.2 K/UL (ref 0–0.6)
EOSINOPHIL NFR BLD: 3.8 %
EPO SERPL-ACNC: 586 MU/ML (ref 4–27)
GFR SERPLBLD CREATININE-BSD FMLA CKD-EPI: 15 ML/MIN/{1.73_M2}
GLUCOSE SERPL-MCNC: 86 MG/DL (ref 70–99)
HAPTOGLOB SERPL-MCNC: 72.4 MG/DL (ref 30–200)
HCT VFR BLD AUTO: 23.5 % (ref 36–48)
HGB BLD-MCNC: 8.2 G/DL (ref 12–16)
IRON SATN MFR SERPL: 36 % (ref 15–50)
IRON SERPL-MCNC: 43 UG/DL (ref 37–145)
LYMPHOCYTES # BLD: 1.6 K/UL (ref 1–5.1)
LYMPHOCYTES NFR BLD: 30.1 %
MAGNESIUM SERPL-MCNC: 1.85 MG/DL (ref 1.8–2.4)
MCH RBC QN AUTO: 32.1 PG (ref 26–34)
MCHC RBC AUTO-ENTMCNC: 34.9 G/DL (ref 31–36)
MCV RBC AUTO: 91.9 FL (ref 80–100)
MONOCYTES # BLD: 0.5 K/UL (ref 0–1.3)
MONOCYTES NFR BLD: 9.8 %
NEUTROPHILS # BLD: 2.9 K/UL (ref 1.7–7.7)
NEUTROPHILS NFR BLD: 55.9 %
PHOSPHATE SERPL-MCNC: 1.7 MG/DL (ref 2.5–4.9)
PLATELET # BLD AUTO: 81 K/UL (ref 135–450)
PMV BLD AUTO: 9.7 FL (ref 5–10.5)
POTASSIUM SERPL-SCNC: 3.7 MMOL/L (ref 3.5–5.1)
RBC # BLD AUTO: 2.55 M/UL (ref 4–5.2)
SODIUM SERPL-SCNC: 134 MMOL/L (ref 136–145)
TIBC SERPL-MCNC: 121 UG/DL (ref 260–445)
WBC # BLD AUTO: 5.2 K/UL (ref 4–11)

## 2025-08-23 PROCEDURE — 83735 ASSAY OF MAGNESIUM: CPT

## 2025-08-23 PROCEDURE — 80069 RENAL FUNCTION PANEL: CPT

## 2025-08-23 PROCEDURE — 6370000000 HC RX 637 (ALT 250 FOR IP): Performed by: INTERNAL MEDICINE

## 2025-08-23 PROCEDURE — 2060000000 HC ICU INTERMEDIATE R&B

## 2025-08-23 PROCEDURE — 2580000003 HC RX 258

## 2025-08-23 PROCEDURE — 6370000000 HC RX 637 (ALT 250 FOR IP)

## 2025-08-23 PROCEDURE — 2500000003 HC RX 250 WO HCPCS: Performed by: INTERNAL MEDICINE

## 2025-08-23 PROCEDURE — 85025 COMPLETE CBC W/AUTO DIFF WBC: CPT

## 2025-08-23 PROCEDURE — 2500000003 HC RX 250 WO HCPCS

## 2025-08-23 PROCEDURE — 36415 COLL VENOUS BLD VENIPUNCTURE: CPT

## 2025-08-23 RX ORDER — PANTOPRAZOLE SODIUM 40 MG/1
40 TABLET, DELAYED RELEASE ORAL
Status: DISCONTINUED | OUTPATIENT
Start: 2025-08-23 | End: 2025-08-24 | Stop reason: HOSPADM

## 2025-08-23 RX ADMIN — Medication 250 MG: at 08:19

## 2025-08-23 RX ADMIN — PANTOPRAZOLE SODIUM 40 MG: 40 TABLET, DELAYED RELEASE ORAL at 15:38

## 2025-08-23 RX ADMIN — PANTOPRAZOLE SODIUM 40 MG: 40 TABLET, DELAYED RELEASE ORAL at 12:11

## 2025-08-23 RX ADMIN — Medication 250 MG: at 19:59

## 2025-08-23 RX ADMIN — DONEPEZIL HYDROCHLORIDE 5 MG: 5 TABLET, FILM COATED ORAL at 08:17

## 2025-08-23 RX ADMIN — ATORVASTATIN CALCIUM 10 MG: 10 TABLET, FILM COATED ORAL at 19:58

## 2025-08-23 RX ADMIN — MIDODRINE HYDROCHLORIDE 5 MG: 5 TABLET ORAL at 08:17

## 2025-08-23 RX ADMIN — SUCRALFATE ORAL SUSPENSION 1 G: 1 SUSPENSION ORAL at 05:43

## 2025-08-23 RX ADMIN — Medication 1 TABLET: at 08:17

## 2025-08-23 RX ADMIN — MIDODRINE HYDROCHLORIDE 5 MG: 5 TABLET ORAL at 00:54

## 2025-08-23 RX ADMIN — SUCRALFATE ORAL SUSPENSION 1 G: 1 SUSPENSION ORAL at 12:11

## 2025-08-23 RX ADMIN — SODIUM CHLORIDE, PRESERVATIVE FREE 10 ML: 5 INJECTION INTRAVENOUS at 19:59

## 2025-08-23 RX ADMIN — SUCRALFATE ORAL SUSPENSION 1 G: 1 SUSPENSION ORAL at 17:39

## 2025-08-23 RX ADMIN — POTASSIUM PHOSPHATE, MONOBASIC AND POTASSIUM PHOSPHATE, DIBASIC 15 MMOL: 224; 236 INJECTION, SOLUTION, CONCENTRATE INTRAVENOUS at 08:32

## 2025-08-23 RX ADMIN — MIDODRINE HYDROCHLORIDE 5 MG: 5 TABLET ORAL at 17:38

## 2025-08-23 RX ADMIN — Medication 1 TABLET: at 19:58

## 2025-08-23 RX ADMIN — ESCITALOPRAM OXALATE 10 MG: 10 TABLET ORAL at 08:17

## 2025-08-23 RX ADMIN — Medication 250 MG: at 15:38

## 2025-08-23 RX ADMIN — Medication 1 MG: at 08:17

## 2025-08-23 RX ADMIN — SUCRALFATE ORAL SUSPENSION 1 G: 1 SUSPENSION ORAL at 20:00

## 2025-08-23 RX ADMIN — SODIUM CHLORIDE, PRESERVATIVE FREE 10 ML: 5 INJECTION INTRAVENOUS at 08:32

## 2025-08-23 RX ADMIN — LATANOPROST 1 DROP: 50 SOLUTION OPHTHALMIC at 20:00

## 2025-08-23 RX ADMIN — QUETIAPINE FUMARATE 50 MG: 25 TABLET ORAL at 19:58

## 2025-08-23 RX ADMIN — QUETIAPINE FUMARATE 50 MG: 25 TABLET ORAL at 08:17

## 2025-08-23 ASSESSMENT — PAIN SCALES - GENERAL
PAINLEVEL_OUTOF10: 0

## 2025-08-24 VITALS
BODY MASS INDEX: 30.43 KG/M2 | OXYGEN SATURATION: 98 % | HEIGHT: 60 IN | DIASTOLIC BLOOD PRESSURE: 52 MMHG | RESPIRATION RATE: 16 BRPM | HEART RATE: 95 BPM | WEIGHT: 154.98 LBS | TEMPERATURE: 97.7 F | SYSTOLIC BLOOD PRESSURE: 94 MMHG

## 2025-08-24 LAB
ALBUMIN SERPL-MCNC: 2.5 G/DL (ref 3.4–5)
ANION GAP SERPL CALCULATED.3IONS-SCNC: 8 MMOL/L (ref 3–16)
BASOPHILS # BLD: 0 K/UL (ref 0–0.2)
BASOPHILS NFR BLD: 0.3 %
BUN SERPL-MCNC: 11 MG/DL (ref 7–20)
CALCIUM SERPL-MCNC: 8.6 MG/DL (ref 8.3–10.6)
CHLORIDE SERPL-SCNC: 101 MMOL/L (ref 99–110)
CO2 SERPL-SCNC: 26 MMOL/L (ref 21–32)
CREAT SERPL-MCNC: 4.5 MG/DL (ref 0.6–1.2)
DEPRECATED RDW RBC AUTO: 16.4 % (ref 12.4–15.4)
EOSINOPHIL # BLD: 0.2 K/UL (ref 0–0.6)
EOSINOPHIL NFR BLD: 3.5 %
GFR SERPLBLD CREATININE-BSD FMLA CKD-EPI: 9 ML/MIN/{1.73_M2}
GLUCOSE SERPL-MCNC: 91 MG/DL (ref 70–99)
HCT VFR BLD AUTO: 25.5 % (ref 36–48)
HGB BLD-MCNC: 8.6 G/DL (ref 12–16)
LYMPHOCYTES # BLD: 1.6 K/UL (ref 1–5.1)
LYMPHOCYTES NFR BLD: 27.9 %
MAGNESIUM SERPL-MCNC: 1.91 MG/DL (ref 1.8–2.4)
MCH RBC QN AUTO: 31.3 PG (ref 26–34)
MCHC RBC AUTO-ENTMCNC: 33.6 G/DL (ref 31–36)
MCV RBC AUTO: 93.3 FL (ref 80–100)
MONOCYTES # BLD: 0.5 K/UL (ref 0–1.3)
MONOCYTES NFR BLD: 9.3 %
NEUTROPHILS # BLD: 3.3 K/UL (ref 1.7–7.7)
NEUTROPHILS NFR BLD: 59 %
PHOSPHATE SERPL-MCNC: 3.1 MG/DL (ref 2.5–4.9)
PLATELET # BLD AUTO: 97 K/UL (ref 135–450)
PMV BLD AUTO: 9.9 FL (ref 5–10.5)
POTASSIUM SERPL-SCNC: 4 MMOL/L (ref 3.5–5.1)
RBC # BLD AUTO: 2.73 M/UL (ref 4–5.2)
SODIUM SERPL-SCNC: 135 MMOL/L (ref 136–145)
WBC # BLD AUTO: 5.7 K/UL (ref 4–11)

## 2025-08-24 PROCEDURE — 6370000000 HC RX 637 (ALT 250 FOR IP): Performed by: INTERNAL MEDICINE

## 2025-08-24 PROCEDURE — 85025 COMPLETE CBC W/AUTO DIFF WBC: CPT

## 2025-08-24 PROCEDURE — 80069 RENAL FUNCTION PANEL: CPT

## 2025-08-24 PROCEDURE — 36415 COLL VENOUS BLD VENIPUNCTURE: CPT

## 2025-08-24 PROCEDURE — 6370000000 HC RX 637 (ALT 250 FOR IP)

## 2025-08-24 PROCEDURE — 2500000003 HC RX 250 WO HCPCS: Performed by: INTERNAL MEDICINE

## 2025-08-24 PROCEDURE — 83735 ASSAY OF MAGNESIUM: CPT

## 2025-08-24 RX ADMIN — PANTOPRAZOLE SODIUM 40 MG: 40 TABLET, DELAYED RELEASE ORAL at 06:02

## 2025-08-24 RX ADMIN — MIDODRINE HYDROCHLORIDE 5 MG: 5 TABLET ORAL at 00:33

## 2025-08-24 RX ADMIN — QUETIAPINE FUMARATE 50 MG: 25 TABLET ORAL at 09:34

## 2025-08-24 RX ADMIN — SUCRALFATE ORAL SUSPENSION 1 G: 1 SUSPENSION ORAL at 06:02

## 2025-08-24 RX ADMIN — SUCRALFATE ORAL SUSPENSION 1 G: 1 SUSPENSION ORAL at 10:48

## 2025-08-24 RX ADMIN — Medication 1 TABLET: at 09:35

## 2025-08-24 RX ADMIN — SODIUM CHLORIDE, PRESERVATIVE FREE 10 ML: 5 INJECTION INTRAVENOUS at 09:51

## 2025-08-24 RX ADMIN — DONEPEZIL HYDROCHLORIDE 5 MG: 5 TABLET, FILM COATED ORAL at 09:35

## 2025-08-24 RX ADMIN — ESCITALOPRAM OXALATE 10 MG: 10 TABLET ORAL at 09:35

## 2025-08-24 RX ADMIN — MIDODRINE HYDROCHLORIDE 5 MG: 5 TABLET ORAL at 09:35

## 2025-08-24 RX ADMIN — Medication 1 MG: at 09:35

## 2025-08-24 ASSESSMENT — PAIN SCALES - GENERAL
PAINLEVEL_OUTOF10: 0

## 2025-08-25 LAB
REASON FOR REJECTION: NORMAL
REJECTED TEST: NORMAL

## 2025-08-28 ENCOUNTER — TELEPHONE (OUTPATIENT)
Dept: INTERNAL MEDICINE CLINIC | Age: 89
End: 2025-08-28

## 2025-08-29 PROBLEM — N82.4 COLOVAGINAL FISTULA: Status: ACTIVE | Noted: 2025-08-29

## (undated) DEVICE — NEEDLE INJ ARTC 2.5MMX230CM

## (undated) DEVICE — CLIP LIGATING RESOLUTION 235CMX2.8MM

## (undated) DEVICE — IMPLANTABLE DEVICE: Type: IMPLANTABLE DEVICE | Status: NON-FUNCTIONAL

## (undated) DEVICE — NEEDLE SCLERO INJ THER 0.5 MMX25 GAX200 CM CATH INTERJECT

## (undated) DEVICE — FORCEPS BX L240CM JAW DIA2.8MM L CAP W/ NDL MIC MESH TOOTH